# Patient Record
Sex: FEMALE | NOT HISPANIC OR LATINO
[De-identification: names, ages, dates, MRNs, and addresses within clinical notes are randomized per-mention and may not be internally consistent; named-entity substitution may affect disease eponyms.]

---

## 2019-05-24 ENCOUNTER — APPOINTMENT (OUTPATIENT)
Dept: BREAST CENTER | Facility: CLINIC | Age: 54
End: 2019-05-24
Payer: COMMERCIAL

## 2019-05-24 VITALS
HEIGHT: 61 IN | BODY MASS INDEX: 27.56 KG/M2 | HEART RATE: 89 BPM | SYSTOLIC BLOOD PRESSURE: 142 MMHG | WEIGHT: 146 LBS | DIASTOLIC BLOOD PRESSURE: 68 MMHG

## 2019-05-24 DIAGNOSIS — Z78.9 OTHER SPECIFIED HEALTH STATUS: ICD-10-CM

## 2019-05-24 PROCEDURE — 99204 OFFICE O/P NEW MOD 45 MIN: CPT

## 2019-05-24 NOTE — PHYSICAL EXAM
[Normocephalic] : normocephalic [EOMI] : extra ocular movement intact [Atraumatic] : atraumatic [PERRL] : pupils equal, round and reactive to light [Sclera nonicteric] : sclera nonicteric [Supple] : supple [No Supraclavicular Adenopathy] : no supraclavicular adenopathy [Examined in the supine and seated position] : examined in the supine and seated position [No dominant masses] : no dominant masses in right breast  [No dominant masses] : no dominant masses left breast [No Nipple Retraction] : no left nipple retraction [No Nipple Discharge] : no left nipple discharge [Breast Nipple Inversion] : nipples not inverted [Breast Nipple Retraction] : nipples not retracted [Breast Nipple Flattening] : nipples not flattened [Breast Nipple Fissures] : nipples not fissured [Breast Abnormal Lactation (Galactorrhea)] : no galactorrhea [Breast Abnormal Secretion Bloody Fluid] : no bloody discharge [Breast Abnormal Secretion Serous Fluid] : no serous discharge [Breast Abnormal Secretion Opalescent Fluid] : no milky discharge [No Axillary Lymphadenopathy] : no left axillary lymphadenopathy [No Edema] : no edema [No Rashes] : no rashes [No Ulceration] : no ulceration [de-identified] : No supraclavicular or axillary adenopathy. No dominant masses, normal to palpation. Everted nipple without discharge. No skin changes.\par \par  [de-identified] : No supraclavicular or axillary adenopathy. No dominant masses, normal to palpation. Everted nipple without discharge. No skin changes.\par \par

## 2019-05-24 NOTE — ASSESSMENT
[FreeTextEntry1] : 52 yo presents with mammogram demonstrating right breast calcifications.  No dominant masses palpable on exam.  Recommendation for right diagnostic mammogram (CD;s to be requested) and follow up 1 week after biopsy if biopsy is warranted.  If mammogram is benign then followup in 6 months.\par 1.  Right diagnostic mammogram\par 2.  Request imaging for upload\par 3.  Follow up in 2 weeks or 6 months depending on repeat mammogram results.

## 2019-05-24 NOTE — HISTORY OF PRESENT ILLNESS
[FreeTextEntry1] : I had the pleasure of seeing NICKY SNYDER  in the office today for a new breast evaluation secondary to new right breast calcifications seen on mammogram.\par \par Nicky is a weston 54 yo who presents for with abnormal imaging from 4/6/2019 demonstrating right breast calcification.  She has not undergone imaging since 2009.\par \par She denies dominant breast mass, skin changes or nipple discharge. She denies headaches, blurry vision, chest pain, SOB, abdominal pain, joint aches or difficult walking.\par \par Imaging:  [facility]\par 4/6/2019  Mammo screening nikhil bilat\par Impression:  The patient will be recalled for additional imaging evaluation of new grouped right lateral central microcalcifications as described above.  BIRADS 0\par \par 4/6/2019  breast ultrasound bilateral complete:  \par Impression:  Negative bilateral breast ultrasound.  No sonographic evidence of new significant breast pathology is seen.  BIRADS0 incomplete\par \par We reviewed and discussed clinical breast exam.  She understands no dominant masses are palpable on exam today.  She understands new calcifications were seen on imaging today.  My office will request her imaging and a right diagnostic mammogram will be setup for her.  If mammogram is benign then follow up in 6 months.  If mammogram recommends biopsy she will return 1 week after biopsy.  She understands and agrees to plan.  All questions answered.

## 2019-06-07 ENCOUNTER — APPOINTMENT (OUTPATIENT)
Dept: MAMMOGRAPHY | Facility: CLINIC | Age: 54
End: 2019-06-07
Payer: SELF-PAY

## 2019-06-07 ENCOUNTER — OUTPATIENT (OUTPATIENT)
Dept: OUTPATIENT SERVICES | Facility: HOSPITAL | Age: 54
LOS: 1 days | End: 2019-06-07
Payer: SELF-PAY

## 2019-06-07 ENCOUNTER — TRANSCRIPTION ENCOUNTER (OUTPATIENT)
Age: 54
End: 2019-06-07

## 2019-06-07 DIAGNOSIS — R92.8 OTHER ABNORMAL AND INCONCLUSIVE FINDINGS ON DIAGNOSTIC IMAGING OF BREAST: ICD-10-CM

## 2019-06-07 PROCEDURE — 77065 DX MAMMO INCL CAD UNI: CPT

## 2019-06-07 PROCEDURE — 77065 DX MAMMO INCL CAD UNI: CPT | Mod: 26,RT

## 2019-06-07 PROCEDURE — G0279: CPT

## 2019-06-07 PROCEDURE — G0279: CPT | Mod: 26

## 2019-06-24 ENCOUNTER — RESULT REVIEW (OUTPATIENT)
Age: 54
End: 2019-06-24

## 2019-06-24 ENCOUNTER — OUTPATIENT (OUTPATIENT)
Dept: OUTPATIENT SERVICES | Facility: HOSPITAL | Age: 54
LOS: 1 days | End: 2019-06-24
Payer: SELF-PAY

## 2019-06-24 ENCOUNTER — APPOINTMENT (OUTPATIENT)
Dept: MAMMOGRAPHY | Facility: CLINIC | Age: 54
End: 2019-06-24
Payer: SELF-PAY

## 2019-06-24 DIAGNOSIS — R92.8 OTHER ABNORMAL AND INCONCLUSIVE FINDINGS ON DIAGNOSTIC IMAGING OF BREAST: ICD-10-CM

## 2019-06-24 DIAGNOSIS — Z00.8 ENCOUNTER FOR OTHER GENERAL EXAMINATION: ICD-10-CM

## 2019-06-24 PROCEDURE — 19081 BX BREAST 1ST LESION STRTCTC: CPT

## 2019-06-24 PROCEDURE — 88305 TISSUE EXAM BY PATHOLOGIST: CPT

## 2019-06-24 PROCEDURE — 19081 BX BREAST 1ST LESION STRTCTC: CPT | Mod: RT

## 2019-06-24 PROCEDURE — 77065 DX MAMMO INCL CAD UNI: CPT

## 2019-06-24 PROCEDURE — 88360 TUMOR IMMUNOHISTOCHEM/MANUAL: CPT | Mod: 26

## 2019-06-24 PROCEDURE — 77065 DX MAMMO INCL CAD UNI: CPT | Mod: 26,RT

## 2019-06-24 PROCEDURE — A4648: CPT

## 2019-06-24 PROCEDURE — 88305 TISSUE EXAM BY PATHOLOGIST: CPT | Mod: 26

## 2019-06-24 PROCEDURE — 88360 TUMOR IMMUNOHISTOCHEM/MANUAL: CPT

## 2019-06-25 ENCOUNTER — APPOINTMENT (OUTPATIENT)
Dept: SURGERY | Facility: CLINIC | Age: 54
End: 2019-06-25

## 2019-07-01 ENCOUNTER — APPOINTMENT (OUTPATIENT)
Dept: SURGERY | Facility: CLINIC | Age: 54
End: 2019-07-01

## 2019-07-08 ENCOUNTER — APPOINTMENT (OUTPATIENT)
Dept: SURGERY | Facility: CLINIC | Age: 54
End: 2019-07-08
Payer: COMMERCIAL

## 2019-07-08 VITALS — WEIGHT: 150 LBS | BODY MASS INDEX: 28.32 KG/M2 | HEIGHT: 61 IN

## 2019-07-08 PROCEDURE — 99215 OFFICE O/P EST HI 40 MIN: CPT

## 2019-07-08 NOTE — HISTORY OF PRESENT ILLNESS
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  She has not undergone imaging since 2009.  She underwent biopsy of right breast on 6/7/2019  which demonstrated right DCIS grade 2-3 ER <>90% NV 0%.\par \par She denies dominant breast mass, skin changes or nipple discharge. She denies headaches, blurry vision, chest pain, SOB, abdominal pain, joint aches or difficult walking.\par \par Imaging:  [facility]\par 4/6/2019  Mammo screening nikhil bilat\par Impression:  The patient will be recalled for additional imaging evaluation of new grouped right lateral central microcalcifications as described above.  BIRADS 0\par \par 4/6/2019  breast ultrasound bilateral complete:  \par Impression:  Negative bilateral breast ultrasound.  No sonographic evidence of new significant breast pathology is seen.  BIRADS0 incomplete\par \par 7/5/2019  MRI breast\par Left breast:  Multiple findings in the left breast as above.  MR guided biopsy x2 is advised of the linear enhancement at 12:00 and a portion of what is likely contiguous to area of non mass enhancements in the lower outer breast.  Recommendation for left MRI biopsy x2 at this time.\par \par Right breast:  Extensive stippled non mass enhancement in the lateral central slightly inferior right breast with what appears to a be a biopsy marker in the midportion.  Correlation with outside imaging and pathology reports is advised at this time.  Assuming this is the area of recent biopsy demonstrating DCIS, MR findings would suggest extensive disease as above.  BIRADS 4B.  \par \par We reviewed and discussed pathology.   Nicky understands that the diagnosis is of right breast stage 0 breast cancer and that recommendation is for surgical management followed by adjuvant treatment.  We thoroughly discussed pathophysiology of ductal carcinoma in situ as well as treatment of surgical breast conservation v mastectomy.\par \par We discussed technical aspects of surgical management which includes right breast localized (needle or ISIDRA ) wide lumpectomy, the need for clear margins and radiation.  We discussed the need for radiation to decrease the locoregional recurrence by up to 50%.  We also discussed 5-6 weeks for standard radiation however she will discuss specifics including Preston hypofractionation with Dr. Toribio.\par \par We also discussed sentinel lymph node sampling if she chooses to undergo mastectomy secondary to the chance of finding an occult breast cancer.  Technical aspects were discussed including the need for blue dye, radiotracer and axillary dissection if the nodes are positive. She also understands that a drain would be placed if an axillary dissection is required. If sentinel mapping fails. then we will await final pathology to determine need for axillary dissection.\par \par Risks v benefits of surgical options discussed as well as recommendation for adjuvant hormonal therapy with aromatase inhibitor for 5-10 years.\par \par She is leaning toward breast conservation and I have recommended she have consults with Dr. Galloway and Dr. Toribio from Medical Oncology and Radiation Oncology respectively.  I have discussed MRI results.  MRI demonstrated 2 suspicious areas in the left breast with recommendation of MR biopsy x2 sites and right breast demonstrated extensive stippled non mass enhancement.\par \par

## 2019-07-08 NOTE — ASSESSMENT
[FreeTextEntry1] : 54 yo presents with newly diagnosed right breast DCIS grade 2-3  ER >90% IL 0%.  MRI of the breast demonstrated extensive right breast non mass enhancement and 2 suspicious areas in the left breast.  Recommendation for MR of left breast x2 sites, consultation with medical oncologist, radiation oncologist, plastic surgeon.\par 1.  MR biopsy of left breast x2 sites\par 2.  Consultation with radiation oncologist -Dr. Toribio\par 3.  Consultation with medical oncologist- Dr. Lund\par 4.  Consultation with plastic surgeon- Dr. De La Vega\par 5.  Follow up genetic results\par 6.  Follow up Oncotype \par 7.  Follow up in 2 weeks

## 2019-07-08 NOTE — PHYSICAL EXAM
[Normocephalic] : normocephalic [Atraumatic] : atraumatic [EOMI] : extra ocular movement intact [PERRL] : pupils equal, round and reactive to light [Sclera nonicteric] : sclera nonicteric [Supple] : supple [No Supraclavicular Adenopathy] : no supraclavicular adenopathy [Examined in the supine and seated position] : examined in the supine and seated position [No dominant masses] : no dominant masses in right breast  [No dominant masses] : no dominant masses left breast [No Nipple Retraction] : no left nipple retraction [No Nipple Discharge] : no left nipple discharge [No Axillary Lymphadenopathy] : no left axillary lymphadenopathy [No Rashes] : no rashes [No Edema] : no edema [No Ulceration] : no ulceration [Breast Nipple Inversion] : nipples not inverted [Breast Nipple Retraction] : nipples not retracted [Breast Nipple Flattening] : nipples not flattened [Breast Nipple Fissures] : nipples not fissured [Breast Abnormal Lactation (Galactorrhea)] : no galactorrhea [Breast Abnormal Secretion Bloody Fluid] : no bloody discharge [Breast Abnormal Secretion Serous Fluid] : no serous discharge [de-identified] : No supraclavicular or axillary adenopathy. No dominant masses, normal to palpation. Everted nipple without discharge. No skin changes.\par \par  [Breast Abnormal Secretion Opalescent Fluid] : no milky discharge [de-identified] : No supraclavicular or axillary adenopathy. No dominant masses, normal to palpation. Everted nipple without discharge. No skin changes.\par \par

## 2019-07-11 ENCOUNTER — OUTPATIENT (OUTPATIENT)
Dept: OUTPATIENT SERVICES | Facility: HOSPITAL | Age: 54
LOS: 1 days | Discharge: HOME | End: 2019-07-11

## 2019-07-12 ENCOUNTER — APPOINTMENT (OUTPATIENT)
Dept: RADIATION ONCOLOGY | Facility: CLINIC | Age: 54
End: 2019-07-12
Payer: COMMERCIAL

## 2019-07-12 VITALS
RESPIRATION RATE: 16 BRPM | BODY MASS INDEX: 27.21 KG/M2 | HEART RATE: 78 BPM | DIASTOLIC BLOOD PRESSURE: 84 MMHG | TEMPERATURE: 98.3 F | SYSTOLIC BLOOD PRESSURE: 147 MMHG | WEIGHT: 144 LBS | OXYGEN SATURATION: 98 %

## 2019-07-12 DIAGNOSIS — Z80.3 FAMILY HISTORY OF MALIGNANT NEOPLASM OF BREAST: ICD-10-CM

## 2019-07-12 PROCEDURE — 99205 OFFICE O/P NEW HI 60 MIN: CPT | Mod: 25

## 2019-07-12 NOTE — PHYSICAL EXAM
[Normal] : normal scars, no masses, no nipple discharge [Supraclavicular Lymph Nodes Enlarged Bilaterally] : supraclavicular [Cervical Lymph Nodes Enlarged Anterior Bilaterally] : anterior cervical [Axillary Lymph Nodes Enlarged Bilaterally] : axillary [de-identified] : Examination performed in the presence of a female chaperone.  Small post-biopsy scab on right breast LOQ, minimal ecchymosis.

## 2019-07-12 NOTE — REVIEW OF SYSTEMS
[Fatigue: Grade 0] : Fatigue: Grade 0 [Breast Pain: Grade 0] : Breast Pain: Grade 0 [Negative] : Allergic/Immunologic

## 2019-07-12 NOTE — OB/GYN HISTORY
[unknown] : the patient is unsure of the date of her LMP [Currently In Menopause] : currently in menopause [Menopause Age: ____] : patient was [unfilled] years old at menopause [___] : Full Term: [unfilled] [History of Hormone Replacement Therapy] : no history of hormone replacement therapy

## 2019-07-12 NOTE — LETTER CLOSING
[Consult Closing:] : Thank you for allowing me to participate in the care of this patient.  If you have any questions, please do not hesitate to contact me. [Sincerely yours,] : Sincerely yours, [FreeTextEntry3] : Aman Toribio MD\par Attending Physician\par Department of Radiation Medicine\par [facility] [place], [facility]\par \par \par [facility] \par [employer] [facility]\par

## 2019-07-12 NOTE — DISEASE MANAGEMENT
[0] : 0 [Clinical] : TNM Stage: c [FreeTextEntry4] : right breast DCIS [TTNM] : is [NTNM] : 0 [MTNM] : 0

## 2019-07-12 NOTE — HISTORY OF PRESENT ILLNESS
[FreeTextEntry1] : Ms Tanner is a 53 year old female presents with newly diagnosed right breast DCIS grade 2-3 ER >90% OR 0%. She presents here today for consideration of radiation therapy for right breast cancer. \par \par She had an abnormal mammogram on 4/6/19 which showed calcification in the right breast. Sonogram was BIRADS 0 incomplete. She had a diagnostic mammogram on 6/7/19 which showed right breast calcifications at 8-9:00.  BIRADS 4B.\par \par She had a stereotactic core biopsy done on 6/24/19, pathology report indicated ductal carcinoma in situ, solid-cribriform pattern, grade 2-3 atypia, necrosis. Microcalcifications was present in DCIS.  Tumor was ER >90%, and OR negative 0%.\par \par 7/5/2019 MRI breast:\par Left breast: Multiple findings in the left breast as above. MR guided biopsy x2 is advised of the linear enhancement at 12:00 and a portion of what is likely contiguous to area of non mass enhancements in the lower outer breast. Recommendation for left MRI biopsy x2 at this time.\par Right breast: Extensive stippled non mass enhancement in the lateral central slightly inferior right breast with what appears to a be a biopsy marker in the midportion. Correlation with outside imaging and pathology reports is advised at this time. Assuming this is the area of recent biopsy demonstrating DCIS, MR findings would suggest extensive disease as above. BIRADS 4B. \par \par Patient follow up with Dr. Cruz on 7/8/19.  She denies breast pain, nipple discharge and lymphedema. She has not decided on management.

## 2019-07-18 ENCOUNTER — APPOINTMENT (OUTPATIENT)
Dept: PLASTIC SURGERY | Facility: CLINIC | Age: 54
End: 2019-07-18

## 2019-07-23 ENCOUNTER — APPOINTMENT (OUTPATIENT)
Dept: PLASTIC SURGERY | Facility: CLINIC | Age: 54
End: 2019-07-23
Payer: COMMERCIAL

## 2019-07-23 VITALS
HEART RATE: 84 BPM | SYSTOLIC BLOOD PRESSURE: 148 MMHG | WEIGHT: 146.03 LBS | OXYGEN SATURATION: 99 % | TEMPERATURE: 99.3 F | DIASTOLIC BLOOD PRESSURE: 84 MMHG | BODY MASS INDEX: 27.57 KG/M2 | HEIGHT: 61 IN | RESPIRATION RATE: 16 BRPM

## 2019-07-23 PROCEDURE — XXXXX: CPT

## 2019-08-02 ENCOUNTER — APPOINTMENT (OUTPATIENT)
Dept: MRI IMAGING | Facility: CLINIC | Age: 54
End: 2019-08-02
Payer: SELF-PAY

## 2019-08-02 ENCOUNTER — RESULT REVIEW (OUTPATIENT)
Age: 54
End: 2019-08-02

## 2019-08-02 ENCOUNTER — OUTPATIENT (OUTPATIENT)
Dept: OUTPATIENT SERVICES | Facility: HOSPITAL | Age: 54
LOS: 1 days | End: 2019-08-02
Payer: SELF-PAY

## 2019-08-02 DIAGNOSIS — Z00.8 ENCOUNTER FOR OTHER GENERAL EXAMINATION: ICD-10-CM

## 2019-08-02 DIAGNOSIS — R93.89 ABNORMAL FINDINGS ON DIAGNOSTIC IMAGING OF OTHER SPECIFIED BODY STRUCTURES: ICD-10-CM

## 2019-08-02 PROCEDURE — 77065 DX MAMMO INCL CAD UNI: CPT

## 2019-08-02 PROCEDURE — A9585: CPT

## 2019-08-02 PROCEDURE — 88305 TISSUE EXAM BY PATHOLOGIST: CPT | Mod: 26

## 2019-08-02 PROCEDURE — 19085 BX BREAST 1ST LESION MR IMAG: CPT | Mod: LT

## 2019-08-02 PROCEDURE — 19085 BX BREAST 1ST LESION MR IMAG: CPT

## 2019-08-02 PROCEDURE — 19086 BX BREAST ADD LESION MR IMAG: CPT | Mod: LT

## 2019-08-02 PROCEDURE — 19086 BX BREAST ADD LESION MR IMAG: CPT

## 2019-08-02 PROCEDURE — 88305 TISSUE EXAM BY PATHOLOGIST: CPT

## 2019-08-02 PROCEDURE — 77065 DX MAMMO INCL CAD UNI: CPT | Mod: 26,LT

## 2019-08-02 PROCEDURE — A4648: CPT

## 2019-08-05 LAB — SURGICAL PATHOLOGY STUDY: SIGNIFICANT CHANGE UP

## 2019-08-19 ENCOUNTER — APPOINTMENT (OUTPATIENT)
Dept: SURGERY | Facility: CLINIC | Age: 54
End: 2019-08-19
Payer: COMMERCIAL

## 2019-08-19 VITALS
HEART RATE: 96 BPM | SYSTOLIC BLOOD PRESSURE: 147 MMHG | BODY MASS INDEX: 27.75 KG/M2 | HEIGHT: 61 IN | WEIGHT: 147 LBS | DIASTOLIC BLOOD PRESSURE: 77 MMHG

## 2019-08-19 PROCEDURE — 99215 OFFICE O/P EST HI 40 MIN: CPT

## 2019-08-19 NOTE — HISTORY OF PRESENT ILLNESS
[FreeTextEntry1] : I had the pleasure of seeing Nicky Tanner in the office for a follow up visit to review recent biopsies of the breast.  Nicky is a weston 54 yo postmenopausal female with newly diagnosed right breast DCIS ER >90% TX 0, who underwent MRI evaluation and additional biopsies. \par \par 8/2/2019 Breast, left 12:00 MRI guided core biopsy\par Negative for malignancy\par Proliferative changes with ductal hyperplasia, non atypical, adenosis, and apocrine metaplasia, Fibroadenomatoid change\par Breast left, lower outer, approximately 3:00 MRI guided core biopsy\par -Proliferative changes with ductal hyperplasia, non atypical adenosis, and apocrine metaplasia\par Microcalcification\par \par We reviewed additional left breast biopsies and she understands that they are benign.  She will proceed with bilateral mastectomy with right sentinel lymph node biopsy for DCIS.  We discussed the risks v benefits of sentinel node biopsy and the need for blue dye and radiotracer.  We also reviewed the rate of lymphedema of 7-10%.  She understands that the results of the biopsy will be on permanent sectioning and not intra-operative frozen section.\par \par The patient has an insurance plan that neither I, nor Dr. De La Vega participate in, yet she would like to have care provided by us.  I have requested that SOAMAI review this issue to see if we can be added to this plan STAT or work out a plan for the patient.\par \par She also states that her insurance will not allow her to have surgery prior to March 2020 because that would be 13 months after she added the plan.  I will write a letter of medical necessity so that they can authorize surgery to occur in an acceptable timeframe. \par \par All questions were answered. \par We discussed the technical aspects of the surgery, including risks v benefits.\par

## 2019-08-19 NOTE — ASSESSMENT
[FreeTextEntry1] : 54 yo postmenopausal female with DCIS ER >90% OR - of the right breast s/p MRI assessment and 2 site biopsy of the left breast, presents for review of pathology and surgical plan.  She understands that undergoing mastectomy would likely omit radiation and hormonal therapy.  She has chosen to undergo bilateral mastectomy with right sentinel lymph node biopsy, without reconstruction.  We discussed this choice at length and this is what the patient prefers.\par 1. Insurance issue to be clarified by financial department\par 2. Plan for bilateral mastectomy with right SLNB and skin closure

## 2019-08-19 NOTE — PHYSICAL EXAM
[Normocephalic] : normocephalic [Atraumatic] : atraumatic [EOMI] : extra ocular movement intact [PERRL] : pupils equal, round and reactive to light [Sclera nonicteric] : sclera nonicteric [No Supraclavicular Adenopathy] : no supraclavicular adenopathy [Supple] : supple [Examined in the supine and seated position] : examined in the supine and seated position [No dominant masses] : no dominant masses in right breast  [No dominant masses] : no dominant masses left breast [No Nipple Retraction] : no left nipple retraction [No Nipple Discharge] : no left nipple discharge [No Axillary Lymphadenopathy] : no left axillary lymphadenopathy [No Rashes] : no rashes [No Edema] : no edema [No Ulceration] : no ulceration [Breast Nipple Inversion] : nipples not inverted [Breast Nipple Retraction] : nipples not retracted [Breast Nipple Fissures] : nipples not fissured [Breast Nipple Flattening] : nipples not flattened [Breast Abnormal Secretion Bloody Fluid] : no bloody discharge [Breast Abnormal Lactation (Galactorrhea)] : no galactorrhea [Breast Abnormal Secretion Serous Fluid] : no serous discharge [Breast Abnormal Secretion Opalescent Fluid] : no milky discharge [de-identified] : No supraclavicular or axillary adenopathy. No dominant masses, normal to palpation. Everted nipple without discharge. No skin changes.\par \par  [de-identified] : No supraclavicular or axillary adenopathy. No dominant masses, normal to palpation. Everted nipple without discharge. No skin changes.\par \par

## 2019-10-07 ENCOUNTER — APPOINTMENT (OUTPATIENT)
Dept: SURGERY | Facility: CLINIC | Age: 54
End: 2019-10-07

## 2019-10-14 ENCOUNTER — APPOINTMENT (OUTPATIENT)
Dept: SURGERY | Facility: CLINIC | Age: 54
End: 2019-10-14
Payer: COMMERCIAL

## 2019-10-14 VITALS
WEIGHT: 152 LBS | SYSTOLIC BLOOD PRESSURE: 152 MMHG | HEIGHT: 61 IN | BODY MASS INDEX: 28.7 KG/M2 | HEART RATE: 86 BPM | DIASTOLIC BLOOD PRESSURE: 78 MMHG

## 2019-10-14 PROCEDURE — 99215 OFFICE O/P EST HI 40 MIN: CPT

## 2019-10-14 NOTE — ASSESSMENT
[FreeTextEntry1] : 54 yo postmenopausal female presents with newly diagnosed right breast DCIS ER90% MI- and is undergoing bilateral mastectomy with right sentinel lymph node biopsy and oncoplastic closure.  She understands risks v benefits, technical aspects of procedure and alternatives. We reviewed previous biopsies of left breast.\par 1. Plan for surgery\par 2. Medical clearance\par 3. Follow up with Dr. De La Vega for oncoplastic closure planning

## 2019-10-14 NOTE — HISTORY OF PRESENT ILLNESS
[FreeTextEntry1] : I had the pleasure of seeing Nicky Tanner in the office for a follow up visit regarding right breast cancer (DCIS ER90% IN- \par \par She had recently undergone left brast biopsy x 2 which were benign on 8/2/2019.\par \par Nicky had issues with insurance that are now resolved. She would like to proceed with bilateral mastectomy with right sentinel lymph node biopsy.  She understands rationale for lymph node sampling and need for blue dye and radiotracer.  She is certain that she would not like to undergo reconstruction of breasts, however she would like to undergo oncoplastic skin closure by Dr. De La Vega. \par \par We discussed technical aspects of surgery and alternative of immediate reconstruction or breast conservation.  She understands technical aspects of mastectomy and sentinel lymph node biopsy on the right side, and risk reduction left mastectomy.\par \par All questions were answered.

## 2019-10-16 ENCOUNTER — APPOINTMENT (OUTPATIENT)
Dept: INTERNAL MEDICINE | Facility: CLINIC | Age: 54
End: 2019-10-16
Payer: COMMERCIAL

## 2019-10-16 VITALS
SYSTOLIC BLOOD PRESSURE: 140 MMHG | RESPIRATION RATE: 13 BRPM | HEART RATE: 105 BPM | OXYGEN SATURATION: 98 % | BODY MASS INDEX: 27.96 KG/M2 | DIASTOLIC BLOOD PRESSURE: 82 MMHG | WEIGHT: 148 LBS

## 2019-10-16 DIAGNOSIS — Z82.49 FAMILY HISTORY OF ISCHEMIC HEART DISEASE AND OTHER DISEASES OF THE CIRCULATORY SYSTEM: ICD-10-CM

## 2019-10-16 DIAGNOSIS — Z82.0 FAMILY HISTORY OF EPILEPSY AND OTHER DISEASES OF THE NERVOUS SYSTEM: ICD-10-CM

## 2019-10-16 PROCEDURE — G0442 ANNUAL ALCOHOL SCREEN 15 MIN: CPT | Mod: 59

## 2019-10-16 PROCEDURE — 90715 TDAP VACCINE 7 YRS/> IM: CPT

## 2019-10-16 PROCEDURE — 99203 OFFICE O/P NEW LOW 30 MIN: CPT | Mod: 25

## 2019-10-16 PROCEDURE — G0444 DEPRESSION SCREEN ANNUAL: CPT | Mod: 59

## 2019-10-16 PROCEDURE — 90471 IMMUNIZATION ADMIN: CPT

## 2019-10-16 NOTE — ASSESSMENT
[FreeTextEntry1] : VASILE is a 53 year F whom is here today for an initial office visit as her insurance company no longer covers her prior PMD.\par Today, patient reports that she is feeling well. States that she has been handling this new diagnosis well and denies any changes with regards to her mood. Does report Acid reflux type symptoms that have been going on for the past one to 2 years. Patient describes it as a burning sensation that occurs throughout the day. Denies any chest pain or shortness of breath. Has not tried any OTC medications. Has never had a colonoscopy or upper EGD. Denies any weight loss, fever, chills.She does report cough since the onset of the symptoms and that her taste in her mouth upon awakening some days.\par \par -PMH: Ductal Carcinoma Insitu Right Breast (Dx June 2019)\par -SH: . Dentist. 2 daughters.\par \par Follows with the following Physicians:\par -Prior PMD: Dr. Mora Medina ([phone])\par -OBGYN: Dr. Toshia Villarreal ([phone])\par -Plastics: Dr. Nicolas De La Vega ([phone])\par \par -Vaccines: Needs Shingles\par -DEXA: 2015. Normal. \par -Mammogram: 2019. Abnormal \par -Pap Smear: 2017. Normal\par -Colonoscopy: Needs\par \par -Ductal Carcinoma In Situ Right breast: Has b/l Mastectomy scheduled on 11/13/19.\par \par -F/u records (Ie; ekg and prior labs) from prior PMD\par -F/u w/ GI for EGD and Colonoscopy \par -Take OTC PPI for Presumed GERD x 8 weeks. \par -RTO 3months or sooner if needed

## 2019-10-16 NOTE — HISTORY OF PRESENT ILLNESS
[FreeTextEntry8] : VASILE is a 53 year F whom is here today for an initial office visit as her insurance company no longer covers her prior PMD.\par Today, patient reports that she is feeling well. States that she has been handling this new diagnosis well and denies any changes with regards to her mood. Does report Acid reflux type symptoms that have been going on for the past one to 2 years. Patient describes it as a burning sensation that occurs throughout the day. Denies any chest pain or shortness of breath. Has not tried any OTC medications. Has never had a colonoscopy or upper EGD. Denies any weight loss, fever, chills.She does report cough since the onset of the symptoms and that her taste in her mouth upon awakening some days.\par \par -PMH: Ductal Carcinoma Insitu Right Breast (Dx June 2019)\par -SH: . Dentist. 2 daughters.\par \par Follows with the following Physicians:\par -Prior PMD: Dr. Mora Medina ([phone])\par -OBGYN: Dr. Toshia Villarreal ([phone])\par -Plastics: Dr. Nicolas De La Vega ([phone])\par \par -Vaccines: Needs Shingles\par -DEXA: 2015. Normal. \par -Mammogram: 2019. Abnormal \par -Pap Smear: 2017. Normal\par -Colonoscopy: Needs\par \par -Ductal Carcinoma In Situ Right breast: Has b/l Mastectomy scheduled on 11/13/19.

## 2019-10-16 NOTE — HEALTH RISK ASSESSMENT
[] : No [Yes] : Yes [Monthly or less (1 pt)] : Monthly or less (1 point) [1 or 2 (0 pts)] : 1 or 2 (0 points) [Never (0 pts)] : Never (0 points) [No] : In the past 12 months have you used drugs other than those required for medical reasons? No [0] : 2) Feeling down, depressed, or hopeless: Not at all (0) [Audit-CScore] : 1 [[id number]] : 0

## 2019-10-17 ENCOUNTER — APPOINTMENT (OUTPATIENT)
Dept: PLASTIC SURGERY | Facility: CLINIC | Age: 54
End: 2019-10-17
Payer: COMMERCIAL

## 2019-10-17 VITALS
SYSTOLIC BLOOD PRESSURE: 149 MMHG | WEIGHT: 148 LBS | DIASTOLIC BLOOD PRESSURE: 91 MMHG | RESPIRATION RATE: 14 BRPM | HEART RATE: 91 BPM | OXYGEN SATURATION: 99 % | HEIGHT: 61 IN | BODY MASS INDEX: 27.94 KG/M2

## 2019-10-17 DIAGNOSIS — D05.11 INTRADUCTAL CARCINOMA IN SITU OF RIGHT BREAST: ICD-10-CM

## 2019-10-17 PROCEDURE — XXXXX: CPT

## 2019-11-01 ENCOUNTER — OUTPATIENT (OUTPATIENT)
Dept: OUTPATIENT SERVICES | Facility: HOSPITAL | Age: 54
LOS: 1 days | End: 2019-11-01
Payer: COMMERCIAL

## 2019-11-01 VITALS
HEART RATE: 80 BPM | WEIGHT: 149.91 LBS | RESPIRATION RATE: 20 BRPM | TEMPERATURE: 99 F | SYSTOLIC BLOOD PRESSURE: 144 MMHG | DIASTOLIC BLOOD PRESSURE: 81 MMHG | HEIGHT: 61 IN

## 2019-11-01 DIAGNOSIS — Z90.49 ACQUIRED ABSENCE OF OTHER SPECIFIED PARTS OF DIGESTIVE TRACT: Chronic | ICD-10-CM

## 2019-11-01 DIAGNOSIS — Z98.891 HISTORY OF UTERINE SCAR FROM PREVIOUS SURGERY: Chronic | ICD-10-CM

## 2019-11-01 DIAGNOSIS — Z01.818 ENCOUNTER FOR OTHER PREPROCEDURAL EXAMINATION: ICD-10-CM

## 2019-11-01 DIAGNOSIS — D05.11 INTRADUCTAL CARCINOMA IN SITU OF RIGHT BREAST: ICD-10-CM

## 2019-11-01 DIAGNOSIS — Z29.9 ENCOUNTER FOR PROPHYLACTIC MEASURES, UNSPECIFIED: ICD-10-CM

## 2019-11-01 LAB
ANION GAP SERPL CALC-SCNC: 15 MMOL/L — SIGNIFICANT CHANGE UP (ref 5–17)
BUN SERPL-MCNC: 16 MG/DL — SIGNIFICANT CHANGE UP (ref 8–20)
CALCIUM SERPL-MCNC: 9.5 MG/DL — SIGNIFICANT CHANGE UP (ref 8.6–10.2)
CHLORIDE SERPL-SCNC: 100 MMOL/L — SIGNIFICANT CHANGE UP (ref 98–107)
CO2 SERPL-SCNC: 26 MMOL/L — SIGNIFICANT CHANGE UP (ref 22–29)
CREAT SERPL-MCNC: 0.89 MG/DL — SIGNIFICANT CHANGE UP (ref 0.5–1.3)
GLUCOSE SERPL-MCNC: 92 MG/DL — SIGNIFICANT CHANGE UP (ref 70–115)
HCT VFR BLD CALC: 38.9 % — SIGNIFICANT CHANGE UP (ref 34.5–45)
HGB BLD-MCNC: 12.8 G/DL — SIGNIFICANT CHANGE UP (ref 11.5–15.5)
MCHC RBC-ENTMCNC: 31 PG — SIGNIFICANT CHANGE UP (ref 27–34)
MCHC RBC-ENTMCNC: 32.9 GM/DL — SIGNIFICANT CHANGE UP (ref 32–36)
MCV RBC AUTO: 94.2 FL — SIGNIFICANT CHANGE UP (ref 80–100)
PLATELET # BLD AUTO: 317 K/UL — SIGNIFICANT CHANGE UP (ref 150–400)
POTASSIUM SERPL-MCNC: 4.5 MMOL/L — SIGNIFICANT CHANGE UP (ref 3.5–5.3)
POTASSIUM SERPL-SCNC: 4.5 MMOL/L — SIGNIFICANT CHANGE UP (ref 3.5–5.3)
RBC # BLD: 4.13 M/UL — SIGNIFICANT CHANGE UP (ref 3.8–5.2)
RBC # FLD: 12.8 % — SIGNIFICANT CHANGE UP (ref 10.3–14.5)
SODIUM SERPL-SCNC: 141 MMOL/L — SIGNIFICANT CHANGE UP (ref 135–145)
WBC # BLD: 7.7 K/UL — SIGNIFICANT CHANGE UP (ref 3.8–10.5)
WBC # FLD AUTO: 7.7 K/UL — SIGNIFICANT CHANGE UP (ref 3.8–10.5)

## 2019-11-01 PROCEDURE — 93010 ELECTROCARDIOGRAM REPORT: CPT

## 2019-11-01 PROCEDURE — G0463: CPT

## 2019-11-01 PROCEDURE — 85027 COMPLETE CBC AUTOMATED: CPT

## 2019-11-01 PROCEDURE — 93005 ELECTROCARDIOGRAM TRACING: CPT

## 2019-11-01 PROCEDURE — 36415 COLL VENOUS BLD VENIPUNCTURE: CPT

## 2019-11-01 PROCEDURE — 80048 BASIC METABOLIC PNL TOTAL CA: CPT

## 2019-11-01 RX ORDER — INFLUENZA VIRUS VACCINE 15; 15; 15; 15 UG/.5ML; UG/.5ML; UG/.5ML; UG/.5ML
0.5 SUSPENSION INTRAMUSCULAR ONCE
Refills: 0 | Status: DISCONTINUED | OUTPATIENT
Start: 2019-11-01 | End: 2019-11-17

## 2019-11-01 NOTE — H&P PST ADULT - NSANTHOSAYNRD_GEN_A_CORE
No. SHIVANI screening performed.  STOP BANG Legend: 0-2 = LOW Risk; 3-4 = INTERMEDIATE Risk; 5-8 = HIGH Risk

## 2019-11-01 NOTE — H&P PST ADULT - ASSESSMENT
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    CAPRINI VTE 2.0 SCORE [CLOT updated 2019]    AGE RELATED RISK FACTORS                                                       MOBILITY RELATED FACTORS  [ x ] Age 41-60 years                                            (1 Point)                    [ ] Bed rest                                                        (1 Point)  [ ] Age: 61-74 years                                           (2 Points)                  [ ] Plaster cast                                                   (2 Points)  [ ] Age= 75 years                                              (3 Points)                    [ ] Bed bound for more than 72 hours                 (2 Points)    DISEASE RELATED RISK FACTORS                                               GENDER SPECIFIC FACTORS  [ ] Edema in the lower extremities                       (1 Point)              [ ] Pregnancy                                                     (1 Point)  [ ] Varicose veins                                               (1 Point)                     [ ] Post-partum < 6 weeks                                   (1 Point)             [x ] BMI > 25 Kg/m2                                            (1 Point)                     [ ] Hormonal therapy  or oral contraception          (1 Point)                 [ ] Sepsis (in the previous month)                        (1 Point)               [ ] History of pregnancy complications                 (1 point)  [ ] Pneumonia or serious lung disease                                               [ ] Unexplained or recurrent                     (1 Point)           (in the previous month)                               (1 Point)  [ ] Abnormal pulmonary function test                     (1 Point)                 SURGERY RELATED RISK FACTORS  [ ] Acute myocardial infarction                              (1 Point)               [ ]  Section                                             (1 Point)  [ ] Congestive heart failure (in the previous month)  (1 Point)      [ ] Minor surgery                                                  (1 Point)   [ ] Inflammatory bowel disease                             (1 Point)               [ ] Arthroscopic surgery                                        (2 Points)  [ ] Central venous access                                      (2 Points)                [x ] General surgery lasting more than 45 minutes (2 points)  [ x ] Malignancy- Present or previous                   (2 Points)                [ ] Elective arthroplasty                                         (5 points)    [ ] Stroke (in the previous month)                          (5 Points)                                                                                                                                                           HEMATOLOGY RELATED FACTORS                                                 TRAUMA RELATED RISK FACTORS  [ ] Prior episodes of VTE                                     (3 Points)                [ ] Fracture of the hip, pelvis, or leg                       (5 Points)  [ ] Positive family history for VTE                         (3 Points)             [ ] Acute spinal cord injury (in the previous month)  (5 Points)  [ ] Prothrombin 13838 A                                     (3 Points)               [ ] Paralysis  (less than 1 month)                             (5 Points)  [ ] Factor V Leiden                                             (3 Points)                  [ ] Multiple Trauma within 1 month                        (5 Points)  [ ] Lupus anticoagulants                                     (3 Points)                                                           [ ] Anticardiolipin antibodies                               (3 Points)                                                       [ ] High homocysteine in the blood                      (3 Points)                                             [ ] Other congenital or acquired thrombophilia      (3 Points)                                                [ ] Heparin induced thrombocytopenia                  (3 Points)                                     Total Score [     6     ]    OPIOID RISK TOOL    DEO EACH BOX THAT APPLIES AND ADD TOTALS AT THE END    FAMILY HISTORY OF SUBSTANCE ABUSE                 FEMALE         MALE                                                Alcohol                             [  ]1 pt          [  ]3pts                                               Illegal Durgs                     [  ]2 pts        [  ]3pts                                               Rx Drugs                           [  ]4 pts        [  ]4 pts    PERSONAL HISTORY OF SUBSTANCE ABUSE                                                                                          Alcohol                             [  ]3 pts       [  ]3 pts                                               Illegal Drugs                     [  ]4 pts        [  ]4 pts                                               Rx Drugs                           [  ]5 pts        [  ]5 pts    AGE BETWEEN 16-45 YEARS                                      [  ]1 pt         [  ]1 pt    HISTORY OF PREADOLESCENT   SEXUAL ABUSE                                                             [  ]3 pts        [  ]0pts    PSYCHOLOGICAL DISEASE                     ADD, OCD, Bipolar, Schizophrenia        [  ]2 pts         [  ]2 pts                      Depression                                               [  ]1 pt           [  ]1 pt           SCORING TOTAL   (add numbers and type here)              (*0*)                                     A score of 3 or lower indicated LOW risk for future opioid abuse  A score of 4 to 7 indicated moderate risk for future opioid abuse  A score of 8 or higher indicates a high risk for opioid abuse

## 2019-11-01 NOTE — H&P PST ADULT - NSICDXFAMILYHX_GEN_ALL_CORE_FT
FAMILY HISTORY:  Father  Still living? No  FH: diabetes mellitus, Age at diagnosis: 41-50  FH: hypertension, Age at diagnosis: 41-50    Mother  Still living? No  FH: Parkinson's disease, Age at diagnosis: 61-70

## 2019-11-01 NOTE — H&P PST ADULT - ATTENDING COMMENTS
Patient seen and examined. She is undergoing bilateral mastectomy with right sentinel lymph node biopsy possible axillary dissection. She has right breast cancer. She is undergoing skin closure without reconstruction. She understands alternative as well as risks v benefits. All questions were answered.

## 2019-11-01 NOTE — H&P PST ADULT - HISTORY OF PRESENT ILLNESS
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  Pt had mammography in May followed by positive biopsy.

## 2019-11-01 NOTE — H&P PST ADULT - NSICDXPROBLEM_GEN_ALL_CORE_FT
PROBLEM DIAGNOSES  Problem: Ductal carcinoma in situ of right breast  Assessment and Plan: Bilateral skin sparing mastectomy with right sentinel lymph node biopsy, oncogenic closure with Dr. De La Vega.    Problem: Need for prophylactic measure  Assessment and Plan: Caprini score  6, high risk for VTE, SCDs ordered, surgical team to assess need for pharm proph

## 2019-11-05 PROBLEM — Z78.9 OTHER SPECIFIED HEALTH STATUS: Chronic | Status: ACTIVE | Noted: 2019-11-01

## 2019-11-12 ENCOUNTER — TRANSCRIPTION ENCOUNTER (OUTPATIENT)
Age: 54
End: 2019-11-12

## 2019-11-13 ENCOUNTER — RESULT REVIEW (OUTPATIENT)
Age: 54
End: 2019-11-13

## 2019-11-13 ENCOUNTER — APPOINTMENT (OUTPATIENT)
Dept: SURGERY | Facility: HOSPITAL | Age: 54
End: 2019-11-13

## 2019-11-13 ENCOUNTER — INPATIENT (INPATIENT)
Facility: HOSPITAL | Age: 54
LOS: 0 days | Discharge: HOME | DRG: 578 | End: 2019-11-13
Attending: SURGERY | Admitting: SURGERY
Payer: COMMERCIAL

## 2019-11-13 VITALS
SYSTOLIC BLOOD PRESSURE: 146 MMHG | TEMPERATURE: 98 F | OXYGEN SATURATION: 100 % | WEIGHT: 147.93 LBS | DIASTOLIC BLOOD PRESSURE: 77 MMHG | HEIGHT: 61 IN | HEART RATE: 90 BPM | RESPIRATION RATE: 18 BRPM

## 2019-11-13 VITALS
OXYGEN SATURATION: 100 % | DIASTOLIC BLOOD PRESSURE: 71 MMHG | HEART RATE: 78 BPM | RESPIRATION RATE: 16 BRPM | SYSTOLIC BLOOD PRESSURE: 141 MMHG

## 2019-11-13 DIAGNOSIS — Z98.891 HISTORY OF UTERINE SCAR FROM PREVIOUS SURGERY: Chronic | ICD-10-CM

## 2019-11-13 DIAGNOSIS — D05.11 INTRADUCTAL CARCINOMA IN SITU OF RIGHT BREAST: ICD-10-CM

## 2019-11-13 DIAGNOSIS — Z90.49 ACQUIRED ABSENCE OF OTHER SPECIFIED PARTS OF DIGESTIVE TRACT: Chronic | ICD-10-CM

## 2019-11-13 LAB — BLD GP AB SCN SERPL QL: SIGNIFICANT CHANGE UP

## 2019-11-13 PROCEDURE — 38792 RA TRACER ID OF SENTINL NODE: CPT | Mod: RT

## 2019-11-13 PROCEDURE — 88333 PATH CONSLTJ SURG CYTO XM 1: CPT | Mod: 26

## 2019-11-13 PROCEDURE — 86901 BLOOD TYPING SEROLOGIC RH(D): CPT

## 2019-11-13 PROCEDURE — 19301 PARTIAL MASTECTOMY: CPT | Mod: 50

## 2019-11-13 PROCEDURE — A9541: CPT

## 2019-11-13 PROCEDURE — 19366: CPT | Mod: 50

## 2019-11-13 PROCEDURE — C1889: CPT

## 2019-11-13 PROCEDURE — 88307 TISSUE EXAM BY PATHOLOGIST: CPT

## 2019-11-13 PROCEDURE — 88307 TISSUE EXAM BY PATHOLOGIST: CPT | Mod: 26

## 2019-11-13 PROCEDURE — 88333 PATH CONSLTJ SURG CYTO XM 1: CPT

## 2019-11-13 PROCEDURE — 38525 BIOPSY/REMOVAL LYMPH NODES: CPT | Mod: RT

## 2019-11-13 PROCEDURE — 38792 RA TRACER ID OF SENTINL NODE: CPT

## 2019-11-13 PROCEDURE — 86850 RBC ANTIBODY SCREEN: CPT

## 2019-11-13 PROCEDURE — 86900 BLOOD TYPING SEROLOGIC ABO: CPT

## 2019-11-13 PROCEDURE — 36415 COLL VENOUS BLD VENIPUNCTURE: CPT

## 2019-11-13 RX ORDER — VANCOMYCIN HCL 1 G
1000 VIAL (EA) INTRAVENOUS ONCE
Refills: 0 | Status: COMPLETED | OUTPATIENT
Start: 2019-11-13 | End: 2019-11-13

## 2019-11-13 RX ORDER — SODIUM CHLORIDE 9 MG/ML
1000 INJECTION, SOLUTION INTRAVENOUS
Refills: 0 | Status: DISCONTINUED | OUTPATIENT
Start: 2019-11-13 | End: 2019-11-13

## 2019-11-13 RX ORDER — ONDANSETRON 8 MG/1
4 TABLET, FILM COATED ORAL ONCE
Refills: 0 | Status: DISCONTINUED | OUTPATIENT
Start: 2019-11-13 | End: 2019-11-13

## 2019-11-13 RX ORDER — FENTANYL CITRATE 50 UG/ML
50 INJECTION INTRAVENOUS
Refills: 0 | Status: DISCONTINUED | OUTPATIENT
Start: 2019-11-13 | End: 2019-11-13

## 2019-11-13 RX ORDER — OXYCODONE HYDROCHLORIDE 5 MG/1
1 TABLET ORAL
Qty: 16 | Refills: 0
Start: 2019-11-13 | End: 2019-11-16

## 2019-11-13 RX ADMIN — Medication 250 MILLIGRAM(S): at 08:01

## 2019-11-13 NOTE — BRIEF OPERATIVE NOTE - NSICDXBRIEFPOSTOP_GEN_ALL_CORE_FT
POST-OP DIAGNOSIS:  Breast cancer, right 13-Nov-2019 11:12:19  Linda Smith
POST-OP DIAGNOSIS:  Breast cancer, right 13-Nov-2019 11:12:19  Linda Smith

## 2019-11-13 NOTE — ASU DISCHARGE PLAN (ADULT/PEDIATRIC) - CARE PROVIDER_API CALL
Nicolas De La Vega)  Plastic Surgery; Surgery; Surgery of the Hand  [address], [address]  [place] [address]  Phone: [phone]  Fax: [phone]  Follow Up Time:     Karla Cruz)  Surgery  [address] [place] [address]  Phone: [phone]  Fax: [phone]  Follow Up Time:

## 2019-11-13 NOTE — ASU DISCHARGE PLAN (ADULT/PEDIATRIC) - ASU DC SPECIAL INSTRUCTIONSFT
WOUND CARE: Apply clean gauze and paper tape over drain site once a day. You may remove dressing in 48 hours. Do not peel off the steri strips, they will fall off on their own.   BATHING: Please do not submerge wound underwater. You may shower and/or sponge bathe after 24 hours.   ACTIVITY: No heavy lifting or straining. Otherwise, you may return to your usual level of physical activity. If you are taking narcotic pain medication (such as oxycodone) DO NOT drive a car, operate machinery or make important decisions.  DIET: Return to your usual diet.  NOTIFY YOUR SURGEON IF: You have any bleeding that does not stop, any pus draining from your wound(s), any fever (over 100.4 F) or chills, persistent nausea/vomiting, persistent diarrhea, or if your pain is not controlled on your discharge pain medications.  FOLLOW-UP: Please follow up with Dr. De La Vega in 1 week to remove your drains. Follow-up with Dr. Cruz in 2 weeks. Call for appointment upon discharge.   Please be sure to empty your drains twice a day and record the outputs. You should let Dr. De La Vega know if the outputs are more than 200-300 ml in 12 hours.

## 2019-11-13 NOTE — BRIEF OPERATIVE NOTE - NSICDXBRIEFPROCEDURE_GEN_ALL_CORE_FT
PROCEDURES:  Mastectomy, total, bilateral 13-Nov-2019 11:11:28  Linda Smith
PROCEDURES:  Biopsy, breast and sentinel lymph node 13-Nov-2019 11:11:39  Linda Smith  Mastectomy, total, bilateral 13-Nov-2019 11:11:28  Linda Smith

## 2019-11-13 NOTE — ASU DISCHARGE PLAN (ADULT/PEDIATRIC) - CALL YOUR DOCTOR IF YOU HAVE ANY OF THE FOLLOWING:
Wound/Surgical Site with redness, or foul smelling discharge or pus/Pain not relieved by Medications/Swelling that gets worse/Nausea and vomiting that does not stop/Numbness, tingling, color or temperature change to extremity/Bleeding that does not stop

## 2019-11-13 NOTE — BRIEF OPERATIVE NOTE - NSICDXBRIEFPREOP_GEN_ALL_CORE_FT
PRE-OP DIAGNOSIS:  Breast cancer, right 13-Nov-2019 11:12:09  Linda Smith
PRE-OP DIAGNOSIS:  Breast cancer, right 13-Nov-2019 11:12:09  Linda Smith

## 2019-11-13 NOTE — BRIEF OPERATIVE NOTE - OPERATION/FINDINGS
hemostasis confirmed
wc 1 . bilateral skin sparing mastectomy for R breast cancer.  SNL biopsy x 2 performed, frozen section negative nodes.   Case turned over to Dr. De La Vega for oncplastic closure

## 2019-11-19 LAB — SURGICAL PATHOLOGY STUDY: SIGNIFICANT CHANGE UP

## 2019-11-22 ENCOUNTER — APPOINTMENT (OUTPATIENT)
Dept: PLASTIC SURGERY | Facility: CLINIC | Age: 54
End: 2019-11-22
Payer: COMMERCIAL

## 2019-11-22 VITALS
WEIGHT: 150 LBS | BODY MASS INDEX: 28.32 KG/M2 | TEMPERATURE: 98.4 F | HEIGHT: 61 IN | HEART RATE: 86 BPM | OXYGEN SATURATION: 100 % | SYSTOLIC BLOOD PRESSURE: 131 MMHG | DIASTOLIC BLOOD PRESSURE: 80 MMHG | RESPIRATION RATE: 14 BRPM

## 2019-11-22 PROCEDURE — 99024 POSTOP FOLLOW-UP VISIT: CPT

## 2019-11-22 NOTE — ASSESSMENT
[FreeTextEntry1] : 55 yo female s/p onchoplastic closure 11/13/19\par CYNTHIA drains 1 and 3 removed today without difficulty \par CYNTHIA drains 2 and 4 remain in place \par monitor for redness/swellin/fevers

## 2019-11-22 NOTE — PHYSICAL EXAM
[NI] : Normal [de-identified] : b/l chest wall flat, with no palpable fluid collections or signs of infection or ecchymosis\par incisions are c/d/i, CYNTHIA jason in place on suction  [de-identified] : b

## 2019-11-22 NOTE — HISTORY OF PRESENT ILLNESS
[FreeTextEntry1] : 55 yo female with a history of right DCIS now s/p b/l onchoplastic closure 11/13/19.  Doing well\par Denies fever, chills, LE pain/edema \par CYNTHIA drains \par 1 - 20 \par 2 - 45\par 3 - 20\par 4 - 45\par

## 2019-11-26 ENCOUNTER — APPOINTMENT (OUTPATIENT)
Dept: PLASTIC SURGERY | Facility: CLINIC | Age: 54
End: 2019-11-26
Payer: COMMERCIAL

## 2019-11-26 VITALS
HEIGHT: 61 IN | OXYGEN SATURATION: 100 % | HEART RATE: 82 BPM | DIASTOLIC BLOOD PRESSURE: 78 MMHG | TEMPERATURE: 98 F | BODY MASS INDEX: 26.43 KG/M2 | SYSTOLIC BLOOD PRESSURE: 127 MMHG | RESPIRATION RATE: 16 BRPM | WEIGHT: 140 LBS

## 2019-11-26 PROCEDURE — 99024 POSTOP FOLLOW-UP VISIT: CPT

## 2019-11-26 NOTE — ASSESSMENT
[FreeTextEntry1] : 53 yo female s/p onchoplastic closure 11/13/19\par CYNTHIA drains 2/4 left in place due to output, discussed with Dr. De La Vega\par

## 2019-11-26 NOTE — PHYSICAL EXAM
[NI] : Normal [de-identified] : b/l chest wall flat, with no palpable fluid collections or signs of infection or ecchymosis\par incisions are c/d/i, CYNTHIA jason in place on suction

## 2019-11-26 NOTE — HISTORY OF PRESENT ILLNESS
[FreeTextEntry1] : 53 yo female with a history of right DCIS now s/p b/l onchoplastic closure 11/13/19. pt is doing well\par denies fever, chills, LE pain/edema\par CYNTHIA drains\par 2- 40\par 4- 45

## 2019-12-02 ENCOUNTER — APPOINTMENT (OUTPATIENT)
Dept: SURGERY | Facility: CLINIC | Age: 54
End: 2019-12-02
Payer: COMMERCIAL

## 2019-12-02 VITALS
SYSTOLIC BLOOD PRESSURE: 125 MMHG | DIASTOLIC BLOOD PRESSURE: 70 MMHG | BODY MASS INDEX: 27.42 KG/M2 | TEMPERATURE: 97.9 F | HEIGHT: 62 IN | WEIGHT: 149 LBS

## 2019-12-02 DIAGNOSIS — Z00.00 ENCOUNTER FOR GENERAL ADULT MEDICAL EXAMINATION W/OUT ABNORMAL FINDINGS: ICD-10-CM

## 2019-12-02 DIAGNOSIS — Z86.000 PERSONAL HISTORY OF IN-SITU NEOPLASM OF BREAST: ICD-10-CM

## 2019-12-02 PROCEDURE — 99024 POSTOP FOLLOW-UP VISIT: CPT

## 2019-12-02 NOTE — HISTORY OF PRESENT ILLNESS
[FreeTextEntry1] : I had the pleasure of seeing Nicky Tanner in the office for a post operative visit. She is a weston 55 yo postmenopausal female s/p bilateral mastectomy with right sentinel lymph node biopsy for DCIS ER90% GA- high grade.\par \par Pathology: 11/13/2019\par Right sentinel lymph node 10/3\par Right breast: Ductal carcinoma in situ with high grade atypia and comedonecrosis. It is multifocal and extensive present in multiple quadrants and measures approximately 5cm in aggregate. Negative margins\par Left breast: Short superior long lateral- Biopsy site related changes. Histologically unremarkable nipple areolar complex and skin \par \par She complains of pain at left CYNTHIA site but denies drainage from surgical site or fever. \par \par We reviewed post operative instructions and she understands that she may resume activities of daily living. \par \par We also discussed genetic testing and she understands we will attempt authorization from insurance.\par \par All questions were answered.

## 2019-12-02 NOTE — PHYSICAL EXAM
[Normocephalic] : normocephalic [Atraumatic] : atraumatic [EOMI] : extra ocular movement intact [Sclera nonicteric] : sclera nonicteric [PERRL] : pupils equal, round and reactive to light [No Supraclavicular Adenopathy] : no supraclavicular adenopathy [Supple] : supple [Examined in the supine and seated position] : examined in the supine and seated position [No dominant masses] : no dominant masses in right breast  [No dominant masses] : no dominant masses left breast [No Nipple Retraction] : no left nipple retraction [No Nipple Discharge] : no left nipple discharge [No Axillary Lymphadenopathy] : no right axillary lymphadenopathy [No Ulceration] : no ulceration [No Rashes] : no rashes [No Edema] : no edema [de-identified] : Mastectomy flap without changes. No drainage. CYNTHIA drain removed. \par  [de-identified] : Mastectomy flap without changes. No drainage. CYNTHIA drain removed. \par \par

## 2019-12-02 NOTE — PHYSICAL EXAM
[Normocephalic] : normocephalic [Atraumatic] : atraumatic [EOMI] : extra ocular movement intact [PERRL] : pupils equal, round and reactive to light [Sclera nonicteric] : sclera nonicteric [Supple] : supple [No Supraclavicular Adenopathy] : no supraclavicular adenopathy [Examined in the supine and seated position] : examined in the supine and seated position [No dominant masses] : no dominant masses in right breast  [No dominant masses] : no dominant masses left breast [No Nipple Retraction] : no left nipple retraction [No Nipple Discharge] : no left nipple discharge [No Axillary Lymphadenopathy] : no left axillary lymphadenopathy [No Ulceration] : no ulceration [No Rashes] : no rashes [No Edema] : no edema [de-identified] : Mastectomy flap without changes. No drainage. CYNTHIA drain removed. \par  [de-identified] : Mastectomy flap without changes. No drainage. CYNTHIA drain removed. \par \par

## 2019-12-02 NOTE — ASSESSMENT
[FreeTextEntry1] : 55 yo postmenopausal female s/p bilateral mastectomy with right sentinel lymph node biopsy and oncoplastic closure presents for a post operative visit and review of final pathology.  Stage 0 right breast cancer identified measuring 5cm with clear margins. 0/3 sentinel nodes. No evidence of invasive carcinoma.\par 1. Follow up in 4 months for clinical examination. \par 2. Provide authorization for genetic testing

## 2019-12-02 NOTE — HISTORY OF PRESENT ILLNESS
[FreeTextEntry1] : I had the pleasure of seeing Nicky Tanner in the office for a post operative visit. She is a weston 55 yo postmenopausal female s/p bilateral mastectomy with right sentinel lymph node biopsy for DCIS ER90% [place]- high grade.\par \par Pathology: 11/13/2019\par Right sentinel lymph node 10/3\par Right breast: Ductal carcinoma in situ with high grade atypia and comedonecrosis. It is multifocal and extensive present in multiple quadrants and measures approximately 5cm in aggregate. Negative margins\par Left breast: Short superior long lateral- Biopsy site related changes. Histologically unremarkable nipple areolar complex and skin \par \par She complains of pain at left CYNTHIA site but denies drainage from surgical site or fever. \par \par We reviewed post operative instructions and she understands that she may resume activities of daily living. \par \par We also discussed genetic testing and she understands we will attempt authorization from insurance.\par \par All questions were answered.

## 2019-12-02 NOTE — ASSESSMENT
[FreeTextEntry1] : 53 yo postmenopausal female s/p bilateral mastectomy with right sentinel lymph node biopsy and oncoplastic closure presents for a post operative visit and review of final pathology.  Stage 0 right breast cancer identified measuring 5cm with clear margins. 0/3 sentinel nodes. No evidence of invasive carcinoma.\par 1. Follow up in 4 months for clinical examination. \par 2. Provide authorization for genetic testing

## 2019-12-03 ENCOUNTER — APPOINTMENT (OUTPATIENT)
Dept: PLASTIC SURGERY | Facility: CLINIC | Age: 54
End: 2019-12-03
Payer: COMMERCIAL

## 2019-12-03 VITALS
OXYGEN SATURATION: 97 % | DIASTOLIC BLOOD PRESSURE: 82 MMHG | HEIGHT: 62 IN | BODY MASS INDEX: 27.6 KG/M2 | RESPIRATION RATE: 16 BRPM | HEART RATE: 93 BPM | SYSTOLIC BLOOD PRESSURE: 124 MMHG | WEIGHT: 150 LBS | TEMPERATURE: 98.3 F

## 2019-12-03 PROCEDURE — 99024 POSTOP FOLLOW-UP VISIT: CPT

## 2019-12-03 NOTE — ASSESSMENT
[FreeTextEntry1] : 55 yo female sp onchoplastic closure 11/13/19\par Doing well, no palpable fluid collections noted \par discussed with pt returning to work if she feels comfortable, no heavy lifting more then 10 lbs, start ROM exercises for shoulders \par monitor for fever, chills, redness, swelling, increased pain

## 2019-12-03 NOTE — PHYSICAL EXAM
[de-identified] : b/l chest wall flat, with no palpable fluid collections or signs of infection or ecchymosis\par incisions are c/d/i [NI] : Normal

## 2019-12-03 NOTE — HISTORY OF PRESENT ILLNESS
[FreeTextEntry1] : 55 yo female with a history of right DCIS now s/p b/l onchoplastic closure 11/13/19. pt is doing well\par denies fever, chills, LE pain/edema\par Dr. Cruz removed pts drains at follow up visit yesterday (12/2/19)

## 2019-12-06 ENCOUNTER — APPOINTMENT (OUTPATIENT)
Dept: GASTROENTEROLOGY | Facility: CLINIC | Age: 54
End: 2019-12-06
Payer: COMMERCIAL

## 2019-12-06 VITALS
SYSTOLIC BLOOD PRESSURE: 147 MMHG | BODY MASS INDEX: 26.87 KG/M2 | WEIGHT: 146 LBS | DIASTOLIC BLOOD PRESSURE: 83 MMHG | RESPIRATION RATE: 16 BRPM | HEIGHT: 62 IN | HEART RATE: 84 BPM | OXYGEN SATURATION: 99 %

## 2019-12-06 PROCEDURE — 99203 OFFICE O/P NEW LOW 30 MIN: CPT

## 2019-12-06 NOTE — ASSESSMENT
[FreeTextEntry1] : Prior symptoms of heartburn and regurgitation have completely resolved in the last 3 weeks since breast surgery.\par No upper GI alarm symptoms. Nothing to do for the time being. Watch and wait. Should reflux symptoms redeveloped then consider for upper endoscopy.\par \par Family history is negative for colorectal neoplasia. Physical exam is normal. As such. Patient is at average risk for development of colorectal neoplasia. Therefore, a screening colonoscopy was offered to the patient. The procedure, its risks, benefits, and prepped, were explained to the patient, who understands and is agreeable to proceed. Supraclavicular is less divided dose. No blood work necessary as current blood work from recent surgery as will normal. Plan for elective screening colonoscopy in the summer of 2020.

## 2019-12-06 NOTE — REASON FOR VISIT
[Consultation] : a consultation visit [FreeTextEntry1] : Heartburn for 2 years. Screening colonoscopy.

## 2019-12-06 NOTE — CONSULT LETTER
[Dear  ___] : Dear  [unfilled], [Consult Letter:] : I had the pleasure of evaluating your patient, [unfilled]. [Please see my note below.] : Please see my note below. [Consult Closing:] : Thank you very much for allowing me to participate in the care of this patient.  If you have any questions, please do not hesitate to contact me. [Sincerely,] : Sincerely, [FreeTextEntry1] : Resolved reflux symptoms. Nothing to do appear\par Hemorrhages for development of colorectal cancer. Screening colonoscopy to be arranged. [FreeTextEntry3] : Dany Mathews MD FACG\par Diplomate American Board of Internal Medicine and Gastroenterolgy\par [facility]\par

## 2019-12-06 NOTE — HISTORY OF PRESENT ILLNESS
[FreeTextEntry1] : 54-year-old white female with a surgical history for a , and this was recently diagnosed with DCIS and has undergone bilateral mastectomy 3 weeks ago uneventfully. Patient has made a good recovery, thus far. No active issues.\par Patient did ports have been acid reflux with heartburn and regurgitation. For about 2 years. Almost on a daily basis. Since her breast surgery there has been no change in diet or significant weight loss. No new medications. Patient to take Prilosec once daily for 3 days and currently denies having any dysphagia or diet aphasia, dyspepsia, nausea, or vomiting. Currently feels well from upper GI point of view. No complaints.\par \par Family history is negative for colorectal neoplasia. No prior screening colonoscopy. Patient reports her bowel movements are normal. Occurring regularly and there is been no rectal bleeding. No abdominal pain or unexpected weight loss. No history of anemia or unexpected diarrhea or constipation.

## 2019-12-13 ENCOUNTER — APPOINTMENT (OUTPATIENT)
Dept: BREAST CENTER | Facility: CLINIC | Age: 54
End: 2019-12-13
Payer: COMMERCIAL

## 2019-12-13 VITALS
DIASTOLIC BLOOD PRESSURE: 76 MMHG | HEIGHT: 62 IN | OXYGEN SATURATION: 98 % | HEART RATE: 77 BPM | TEMPERATURE: 97.8 F | SYSTOLIC BLOOD PRESSURE: 136 MMHG | BODY MASS INDEX: 26.69 KG/M2 | WEIGHT: 145.02 LBS

## 2019-12-13 PROCEDURE — 99024 POSTOP FOLLOW-UP VISIT: CPT

## 2019-12-13 NOTE — PHYSICAL EXAM
[Atraumatic] : atraumatic [Normocephalic] : normocephalic [EOMI] : extra ocular movement intact [PERRL] : pupils equal, round and reactive to light [Sclera nonicteric] : sclera nonicteric [Supple] : supple [No dominant masses] : no dominant masses in right breast  [Examined in the supine and seated position] : examined in the supine and seated position [No Supraclavicular Adenopathy] : no supraclavicular adenopathy [No dominant masses] : no dominant masses left breast [No Nipple Discharge] : no right nipple discharge [No Nipple Retraction] : no left nipple retraction [No Axillary Lymphadenopathy] : no left axillary lymphadenopathy [No Edema] : no edema [No Ulceration] : no ulceration [No Rashes] : no rashes [de-identified] : Mastectomy flap without changes. No drainage. CYNTHIA drain removed. \par  [de-identified] : Mastectomy flap without changes. No drainage. CYNTHIA drain removed. \par \par

## 2019-12-13 NOTE — ASSESSMENT
[FreeTextEntry1] : 55 yo postmenopausal female s/p bilateral mastectomy with right sentinel lymph node biopsy and oncoplastic closure presents for a post operative visit and review of final pathology.  Stage 0 right breast cancer identified measuring 5cm with clear margins. 0/3 sentinel nodes. No evidence of invasive carcinoma.\par 1. Follow up in 4 months for clinical examination. \par 2. Follow up genetic testing

## 2019-12-13 NOTE — HISTORY OF PRESENT ILLNESS
[FreeTextEntry1] : I had the pleasure of seeing Nicky Tanner in the office for a post operative visit. She is a weston 55 yo postmenopausal female s/p bilateral mastectomy with right sentinel lymph node biopsy for DCIS ER90% ND- high grade.\par \par Pathology: 11/13/2019\par Right sentinel lymph node 10/3\par Right breast: Ductal carcinoma in situ with high grade atypia and comedonecrosis. It is multifocal and extensive present in multiple quadrants and measures approximately 5cm in aggregate. Negative margins\par Left breast: Short superior long lateral- Biopsy site related changes. Histologically unremarkable nipple areolar complex and skin \par \par She complains of pain at left CYNTHIA site but denies drainage from surgical site or fever. \par \par We reviewed post operative instructions and she understands that she may resume activities of daily living. \par \par We also discussed genetic testing and she submitted blood for testing.\par \par All questions were answered.

## 2019-12-24 ENCOUNTER — APPOINTMENT (OUTPATIENT)
Dept: PLASTIC SURGERY | Facility: CLINIC | Age: 54
End: 2019-12-24
Payer: COMMERCIAL

## 2019-12-24 VITALS
WEIGHT: 145 LBS | DIASTOLIC BLOOD PRESSURE: 92 MMHG | TEMPERATURE: 98.1 F | HEIGHT: 62 IN | OXYGEN SATURATION: 99 % | BODY MASS INDEX: 26.68 KG/M2 | HEART RATE: 78 BPM | SYSTOLIC BLOOD PRESSURE: 150 MMHG

## 2019-12-24 PROCEDURE — 99024 POSTOP FOLLOW-UP VISIT: CPT

## 2019-12-24 NOTE — HISTORY OF PRESENT ILLNESS
[FreeTextEntry1] : 55 yo female with a history of right DCIS now s/p b/l onchoplastic closure 11/13/19. pt is doing well\par denies fever, chills, LE pain/edema\par Pt was concerned because she noticed an area of swelling in her right axilla which she feels has been present since surgery

## 2019-12-24 NOTE — PHYSICAL EXAM
[NI] : Normal [de-identified] : b/l chest wall flat, with no palpable fluid collections or signs of infection or ecchymosis\par incisions are c/d/i\par right scar contracted laterally causing a dimpling in the upper skin flap, mild swelling in the righ axilla, soft

## 2019-12-24 NOTE — ASSESSMENT
[FreeTextEntry1] : 53 yo female s/p b/l oncoplastic closure 11/13/19, doing well \par discussed with pt today to massage the area of swelling as well as her incisions with moisturizer to see if the scar contracture and swelling improves \par discussed with pt that if the swelling is still present after massages an ultrasound can be performed to r/o any fluid collections \par monitor for increasing swelling, fever, redness \par

## 2020-01-14 ENCOUNTER — APPOINTMENT (OUTPATIENT)
Dept: PLASTIC SURGERY | Facility: CLINIC | Age: 55
End: 2020-01-14
Payer: COMMERCIAL

## 2020-01-14 VITALS
SYSTOLIC BLOOD PRESSURE: 124 MMHG | HEART RATE: 80 BPM | BODY MASS INDEX: 27.05 KG/M2 | RESPIRATION RATE: 16 BRPM | TEMPERATURE: 97.8 F | OXYGEN SATURATION: 98 % | DIASTOLIC BLOOD PRESSURE: 76 MMHG | WEIGHT: 147 LBS | HEIGHT: 62 IN

## 2020-01-14 PROCEDURE — 99024 POSTOP FOLLOW-UP VISIT: CPT

## 2020-01-24 ENCOUNTER — APPOINTMENT (OUTPATIENT)
Dept: INTERNAL MEDICINE | Facility: CLINIC | Age: 55
End: 2020-01-24
Payer: COMMERCIAL

## 2020-01-24 VITALS
HEIGHT: 62 IN | SYSTOLIC BLOOD PRESSURE: 130 MMHG | OXYGEN SATURATION: 98 % | WEIGHT: 142 LBS | DIASTOLIC BLOOD PRESSURE: 78 MMHG | HEART RATE: 78 BPM | BODY MASS INDEX: 26.13 KG/M2

## 2020-01-24 DIAGNOSIS — K21.9 GASTRO-ESOPHAGEAL REFLUX DISEASE W/OUT ESOPHAGITIS: ICD-10-CM

## 2020-01-24 DIAGNOSIS — Z13.220 ENCOUNTER FOR SCREENING FOR LIPOID DISORDERS: ICD-10-CM

## 2020-01-24 PROCEDURE — 36415 COLL VENOUS BLD VENIPUNCTURE: CPT

## 2020-01-24 PROCEDURE — 99213 OFFICE O/P EST LOW 20 MIN: CPT | Mod: 25

## 2020-01-25 LAB
ALBUMIN SERPL ELPH-MCNC: 4.8 G/DL
ALP BLD-CCNC: 62 U/L
ALT SERPL-CCNC: 17 U/L
ANION GAP SERPL CALC-SCNC: 18 MMOL/L
AST SERPL-CCNC: 20 U/L
BASOPHILS # BLD AUTO: 0.06 K/UL
BASOPHILS NFR BLD AUTO: 1.1 %
BILIRUB SERPL-MCNC: 0.7 MG/DL
BUN SERPL-MCNC: 16 MG/DL
CALCIUM SERPL-MCNC: 10 MG/DL
CHLORIDE SERPL-SCNC: 100 MMOL/L
CHOLEST SERPL-MCNC: 222 MG/DL
CHOLEST/HDLC SERPL: 2.7 RATIO
CO2 SERPL-SCNC: 24 MMOL/L
CREAT SERPL-MCNC: 0.95 MG/DL
EOSINOPHIL # BLD AUTO: 0.25 K/UL
EOSINOPHIL NFR BLD AUTO: 4.4 %
GLUCOSE SERPL-MCNC: 99 MG/DL
HCT VFR BLD CALC: 41 %
HDLC SERPL-MCNC: 82 MG/DL
HGB BLD-MCNC: 12.9 G/DL
IMM GRANULOCYTES NFR BLD AUTO: 0.2 %
LDLC SERPL CALC-MCNC: 124 MG/DL
LYMPHOCYTES # BLD AUTO: 1.77 K/UL
LYMPHOCYTES NFR BLD AUTO: 31 %
MAN DIFF?: NORMAL
MCHC RBC-ENTMCNC: 29.6 PG
MCHC RBC-ENTMCNC: 31.5 GM/DL
MCV RBC AUTO: 94 FL
MONOCYTES # BLD AUTO: 0.34 K/UL
MONOCYTES NFR BLD AUTO: 6 %
NEUTROPHILS # BLD AUTO: 3.28 K/UL
NEUTROPHILS NFR BLD AUTO: 57.3 %
PLATELET # BLD AUTO: 329 K/UL
POTASSIUM SERPL-SCNC: 4.5 MMOL/L
PROT SERPL-MCNC: 8 G/DL
RBC # BLD: 4.36 M/UL
RBC # FLD: 13.1 %
SODIUM SERPL-SCNC: 142 MMOL/L
TRIGL SERPL-MCNC: 83 MG/DL
WBC # FLD AUTO: 5.71 K/UL

## 2020-01-25 NOTE — ASSESSMENT
[FreeTextEntry1] : -Followup labs drawn in the office today\par -Followup with Dr. Cruz\par -RTO 6 months or sooner if needed (Needs Annual well check)

## 2020-01-25 NOTE — HISTORY OF PRESENT ILLNESS
[FreeTextEntry1] : follow up GERD [de-identified] : -PMH: Ductal Carcinoma Insitu Right Breast (Dx June 2019. S/p b/l Mastectomy 11/13/19.)\par -SH: . Dentist. 2 daughters.\par \par VASILE is a 53 year F whom is here today for follow up GERD\par Since last f/u patient is now s/p b/l Mastectomy on 11/13/19.\par She has followed up w/ GI on 12/6/19. GERD has resolved. She consulted about colonoscopy but still needs to schuled a date\par \par Today, pt reports that she is feeling well. She tolerated surgery well. She is w/o questions or concerns today

## 2020-01-27 ENCOUNTER — TRANSCRIPTION ENCOUNTER (OUTPATIENT)
Age: 55
End: 2020-01-27

## 2020-01-27 ENCOUNTER — RESULT REVIEW (OUTPATIENT)
Age: 55
End: 2020-01-27

## 2020-03-06 ENCOUNTER — APPOINTMENT (OUTPATIENT)
Dept: BREAST CENTER | Facility: CLINIC | Age: 55
End: 2020-03-06
Payer: COMMERCIAL

## 2020-03-06 PROCEDURE — 99214 OFFICE O/P EST MOD 30 MIN: CPT

## 2020-03-09 NOTE — ASSESSMENT
[FreeTextEntry1] : 53 yo postmenopausal female s/p bilateral mastectomy with right sentinel lymph node biopsy and oncoplastic closure presents for a post operative visit and review of final pathology.  Stage 0 right breast cancer identified measuring 5cm with clear margins. 0/3 sentinel nodes. No evidence of invasive carcinoma.  She felt a left breast mass and there is a subcentimeter mass at the mastectomy flap surgical site which is highly suspicious for fat necrosis.  Recommendation for:\par 1. Follow up after imaging\par 2. Left breast ultrasound

## 2020-03-09 NOTE — PHYSICAL EXAM
[Normocephalic] : normocephalic [Atraumatic] : atraumatic [EOMI] : extra ocular movement intact [PERRL] : pupils equal, round and reactive to light [Sclera nonicteric] : sclera nonicteric [Supple] : supple [No Supraclavicular Adenopathy] : no supraclavicular adenopathy [Examined in the supine and seated position] : examined in the supine and seated position [No dominant masses] : no dominant masses in right breast  [No dominant masses] : no dominant masses left breast [No Nipple Retraction] : no left nipple retraction [No Nipple Discharge] : no left nipple discharge [No Axillary Lymphadenopathy] : no left axillary lymphadenopathy [No Edema] : no edema [No Rashes] : no rashes [No Ulceration] : no ulceration [de-identified] : Mastectomy flap without changes. No drainage. CYNTHIA drain removed. \par  [de-identified] : Mastectomy flap without changes. No drainage. Subcentimeter left breast mass at mastectomy surgical site.\par \par

## 2020-03-09 NOTE — HISTORY OF PRESENT ILLNESS
[FreeTextEntry1] : I had the pleasure of seeing Nicky Tanner in the office for an acute visit secondary to left breast nodule at surgical site. \par \par She is a weston 55 yo postmenopausal female s/p bilateral mastectomy with right sentinel lymph node biopsy for DCIS ER90% CT- high grade.\par \par Pathology: 11/13/2019\par Right sentinel lymph node 10/3\par Right breast: Ductal carcinoma in situ with high grade atypia and comedonecrosis. It is multifocal and extensive present in multiple quadrants and measures approximately 5cm in aggregate. Negative margins\par Left breast: Short superior long lateral- Biopsy site related changes. Histologically unremarkable nipple areolar complex and skin \par \par She has a palpable subcentimeter mass at the left mastectomy surgical site which is highly suspicious for fat necrosis.  She denies pain to the area.  Recommendation for left breast ultrasound and will follow up after imaging to review results.  She understands and agrees to plan.\par \par All questions were answered.

## 2020-03-13 ENCOUNTER — OUTPATIENT (OUTPATIENT)
Dept: OUTPATIENT SERVICES | Facility: HOSPITAL | Age: 55
LOS: 1 days | End: 2020-03-13
Payer: COMMERCIAL

## 2020-03-13 ENCOUNTER — APPOINTMENT (OUTPATIENT)
Dept: ULTRASOUND IMAGING | Facility: CLINIC | Age: 55
End: 2020-03-13
Payer: COMMERCIAL

## 2020-03-13 DIAGNOSIS — N63.20 UNSPECIFIED LUMP IN THE LEFT BREAST, UNSPECIFIED QUADRANT: ICD-10-CM

## 2020-03-13 DIAGNOSIS — Z98.891 HISTORY OF UTERINE SCAR FROM PREVIOUS SURGERY: Chronic | ICD-10-CM

## 2020-03-13 DIAGNOSIS — Z90.49 ACQUIRED ABSENCE OF OTHER SPECIFIED PARTS OF DIGESTIVE TRACT: Chronic | ICD-10-CM

## 2020-03-13 PROCEDURE — 76642 ULTRASOUND BREAST LIMITED: CPT | Mod: 26,LT

## 2020-03-13 PROCEDURE — 76642 ULTRASOUND BREAST LIMITED: CPT

## 2020-03-26 NOTE — ASSESSMENT
[FreeTextEntry1] : S/p bilateral mastectomy ( V45.7) ( Z90.13)\par 53 yo female s/p b/l oncoplastic closure 11/13/19, doing well \par discussed with pt today to continue to massage area no further surgical intervention desired scheduled  to follow up with Dr Cruz. \par  \par . \par

## 2020-03-26 NOTE — HISTORY OF PRESENT ILLNESS
[FreeTextEntry1] : 53 yo female with a history of right DCIS now s/p b/l oncoplastic closure 11/13/19. pt is doing well,  denies fever, chills, LE pain/edema  Pt  is healing well no further intervention desired

## 2020-03-29 PROBLEM — D05.11 DUCTAL CARCINOMA IN SITU (DCIS) OF RIGHT BREAST: Status: ACTIVE | Noted: 2019-12-02

## 2020-03-29 NOTE — HISTORY OF PRESENT ILLNESS
[FreeTextEntry1] : 55 yo woman with DCIS presents to discuss reconstruction to formulate a final reconstructive plan.  The patient is planning lumpectomy with Dr. Karla Cruz.

## 2020-04-05 NOTE — ASSESSMENT
[FreeTextEntry1] : 54 y/o female with newly diagnosed right breast DCIS. Her surgical plan with Dr. Cruz is bilateral mastectomy. \par The patient was counseled about reconstructive options following mastectomy, including autologous, prosthetic and the options of foregoing reconstruction. Additionally, she was explained the options regarding the timing of reconstruction, including immediate reconstruction at the time of mastectomy or delayed reconstruction at a later date.\par The patient was extensively counseled on the operation and the perioperative recoveries of both autologous and prosthetic reconstructions. The patient understands the risks with abdominally based microsurgical reconstruction including partial or total flap loss, revisit to the operating room in the chad-operative period, wound dehiscence, mastectomy skin flap necrosis, fat necrosis, abdominal wall morbidity (laxity, bulge, hernia), asymmetry, paresthesia, seroma, hematoma, and infection. She also understands the risks with implant-based reconstruction including infection, implant malposition, extrusion, deflation, capsular contracture, mastectomy skin flap necrosis, wound dehiscence, asymmetry, seroma, paresthesia, and hematoma. \par After a long discussion the patient has elected to undergo\par

## 2020-04-05 NOTE — HISTORY OF PRESENT ILLNESS
[FreeTextEntry1] : 52 y/o female with newly diagnosed right breast DCIS. Presents today for reconstructive consult. \par Patient denies smoking.\par Works as a dentist.\par She has two children, both C-sections,  x2.\par \par Hx of previous appendectomy.\par She denies any heart problems or any previous blood clots. Denies being on any blood thinners.\par No FMHx of bleeding disorders or early breast cancer.\par Allergies to PCN- develops a rash.\par \par

## 2020-04-05 NOTE — PHYSICAL EXAM
[NI] : Normal [de-identified] : See breast examination sheet.\par AP was present during the exam\par

## 2020-04-13 ENCOUNTER — APPOINTMENT (OUTPATIENT)
Dept: SURGERY | Facility: CLINIC | Age: 55
End: 2020-04-13

## 2020-07-31 NOTE — H&P PST ADULT - WEIGHT IN LBS
Pt called to discuss the need for MRI   Reviewed that it has been over 6 mo since her last MRI and it would be helpful to have a f/u study but not essential   She states that she will go for the MRI and see me on 8/10  I encouraged her to call with questions.      
149.9

## 2020-08-03 ENCOUNTER — APPOINTMENT (OUTPATIENT)
Dept: SURGERY | Facility: CLINIC | Age: 55
End: 2020-08-03

## 2020-12-23 PROBLEM — Z13.220 ENCOUNTER FOR SCREENING FOR LIPID DISORDER: Status: RESOLVED | Noted: 2020-01-24 | Resolved: 2020-12-23

## 2021-07-07 ENCOUNTER — APPOINTMENT (OUTPATIENT)
Dept: SURGERY | Facility: CLINIC | Age: 56
End: 2021-07-07
Payer: COMMERCIAL

## 2021-07-07 VITALS
BODY MASS INDEX: 24.66 KG/M2 | TEMPERATURE: 97.2 F | WEIGHT: 134 LBS | DIASTOLIC BLOOD PRESSURE: 78 MMHG | HEART RATE: 74 BPM | OXYGEN SATURATION: 96 % | SYSTOLIC BLOOD PRESSURE: 129 MMHG | HEIGHT: 62 IN

## 2021-07-07 DIAGNOSIS — Z90.13 ACQUIRED ABSENCE OF BILATERAL BREASTS AND NIPPLES: ICD-10-CM

## 2021-07-07 PROCEDURE — 99213 OFFICE O/P EST LOW 20 MIN: CPT

## 2021-07-07 NOTE — HISTORY OF PRESENT ILLNESS
[FreeTextEntry1] : I had the pleasure of seeing Nicky Tanner in the office for a clinical breast evaluation.  She has a histiry of right breast high grade DCIS ER 90% WA negative.\par \par She is a weston 54 yo postmenopausal female s/p bilateral mastectomy with right sentinel lymph node biopsy for DCIS ER90% WA- high grade without evidence of recurrence. \par \par Pathology: 11/13/2019\par Right sentinel lymph node 10/3\par Right breast: Ductal carcinoma in situ with high grade atypia and comedonecrosis. It is multifocal and extensive present in multiple quadrants and measures approximately 5cm in aggregate. Negative margins\par Left breast: Short superior long lateral- Biopsy site related changes. Histologically unremarkable nipple areolar complex and skin \par \par There is no evidence of recurrence and she denies new headaches, blurry vision, SOB, chest pain, abdominal pain, \par \par She understands that surveillance will consist primarily of clinical evaluation and imaging based on symptoms. \par All questions were answered.

## 2021-07-07 NOTE — ASSESSMENT
[FreeTextEntry1] : 54 yo postmenopausal female with a history of right  breast DCIS (high grade ER 90% AR negative) and underwent bilateral mastectomy. There is no evidence of recurrence of new breast cancer.\par 1. Clinical examination in 6 months\par 2. Imaging based on symptoms

## 2022-02-17 NOTE — ASU DISCHARGE PLAN (ADULT/PEDIATRIC) - ***IN THE EVENT THAT YOU DEVELOP A COMPLICATION AND YOU ARE UNABLE TO REACH YOUR OWN PHYSICIAN, YOU MAY CONTACT:
Statement Selected Solaraze Counseling:  I discussed with the patient the risks of Solaraze including but not limited to erythema, scaling, itching, weeping, crusting, and pain.

## 2022-03-18 NOTE — H&P PST ADULT - PSYCHIATRIC
[FreeTextEntry1] : Discussed at length with the patient the planned Monovisc injection. The risks, benefits, convalescence and alternatives were reviewed. The possible side effects discussed included but were not limited to: pain, swelling, heat and redness. There symptoms are generally mild but if they are extensive then contact the office. \par \par Following the discussion, the knee was prepped with Betadine and under sterile technique the Monovisc injection was performed. The needle was introduced into the joint, aspiration was performed to ensure intra-articular placement and the medication was injected. Upon withdrawal of the needle the site was cleaned with alcohol and a Band-Aid applied. The patient tolerated the injection well and there were no adverse effects. Post injection instructions included not strenuous activity for 24 hours, cryotherapy and if there were any adverse effects to contact the office.\par  negative Affect and characteristics of appearance, verbalizations, behaviors are appropriate

## 2024-01-19 ENCOUNTER — LABORATORY RESULT (OUTPATIENT)
Age: 59
End: 2024-01-19

## 2024-01-19 ENCOUNTER — APPOINTMENT (OUTPATIENT)
Dept: INTERNAL MEDICINE | Facility: CLINIC | Age: 59
End: 2024-01-19
Payer: COMMERCIAL

## 2024-01-19 ENCOUNTER — NON-APPOINTMENT (OUTPATIENT)
Age: 59
End: 2024-01-19

## 2024-01-19 VITALS
WEIGHT: 145 LBS | HEART RATE: 77 BPM | SYSTOLIC BLOOD PRESSURE: 126 MMHG | TEMPERATURE: 97.3 F | BODY MASS INDEX: 26.68 KG/M2 | HEIGHT: 62 IN | OXYGEN SATURATION: 99 % | DIASTOLIC BLOOD PRESSURE: 62 MMHG

## 2024-01-19 DIAGNOSIS — Z82.49 FAMILY HISTORY OF ISCHEMIC HEART DISEASE AND OTHER DISEASES OF THE CIRCULATORY SYSTEM: ICD-10-CM

## 2024-01-19 DIAGNOSIS — Z83.49 FAMILY HISTORY OF OTHER ENDOCRINE, NUTRITIONAL AND METABOLIC DISEASES: ICD-10-CM

## 2024-01-19 DIAGNOSIS — R10.9 UNSPECIFIED ABDOMINAL PAIN: ICD-10-CM

## 2024-01-19 DIAGNOSIS — Z83.511 FAMILY HISTORY OF GLAUCOMA: ICD-10-CM

## 2024-01-19 PROCEDURE — 93000 ELECTROCARDIOGRAM COMPLETE: CPT

## 2024-01-19 PROCEDURE — 99386 PREV VISIT NEW AGE 40-64: CPT | Mod: 25

## 2024-01-19 RX ORDER — CHOLECALCIFEROL (VITAMIN D3) 1250 MCG
1.25 MG CAPSULE ORAL
Refills: 0 | Status: DISCONTINUED | COMMUNITY
Start: 2019-10-16 | End: 2024-01-19

## 2024-01-19 RX ORDER — SODIUM SULFATE, POTASSIUM SULFATE, MAGNESIUM SULFATE 17.5; 3.13; 1.6 G/ML; G/ML; G/ML
17.5-3.13-1.6 SOLUTION, CONCENTRATE ORAL
Qty: 1 | Refills: 0 | Status: DISCONTINUED | COMMUNITY
Start: 2019-12-06 | End: 2024-01-19

## 2024-01-19 NOTE — HISTORY OF PRESENT ILLNESS
[de-identified] : VASILE SNYDER is a 58 year F who presents today to establish with [facility] @ [place]. She is here for an Annual Physical Exam. She has a past medical history of DCIS and is s/p bilateral mastectomies.  She is feeling well at today's visit. However, she does report several yrs of right sided abdominal discomfort. 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 It started before the COVID pandemic..

## 2024-01-19 NOTE — HEALTH RISK ASSESSMENT
[2 - 3 times a week (3 pts)] : 2 - 3  times a week (3 points) [1 or 2 (0 pts)] : 1 or 2 (0 points) [Never (0 pts)] : Never (0 points) [No] : In the past 12 months have you used drugs other than those required for medical reasons? No [0] : 2) Feeling down, depressed, or hopeless: Not at all (0) [PHQ-2 Positive] : PHQ-2 Positive [Patient reported PAP Smear was normal] : Patient reported PAP Smear was normal [Patient reported bone density results were normal] : Patient reported bone density results were normal [# of Members in Household ___] :  household currently consist of [unfilled] member(s) [Employed] : employed [Graduate School] : graduate school [# Of Children ___] : has [unfilled] children [Fully functional (bathing, dressing, toileting, transferring, walking, feeding)] : Fully functional (bathing, dressing, toileting, transferring, walking, feeding) [Fully functional (using the telephone, shopping, preparing meals, housekeeping, doing laundry, using] : Fully functional and needs no help or supervision to perform IADLs (using the telephone, shopping, preparing meals, housekeeping, doing laundry, using transportation, managing medications and managing finances) [Smoke Detector] : smoke detector [Carbon Monoxide Detector] : carbon monoxide detector [Seat Belt] :  uses seat belt [Never] : Never [Yes] : Yes [PHQ-2 Negative - No further assessment needed] : PHQ-2 Negative - No further assessment needed [Audit-CScore] : 3 [de-identified] :  4 X weekly lifts weights, sit-ups, walks 4 x weekly [de-identified] : Unrestricted Adult Diet- [[id number]] : 0 [With Family] : lives with family [MammogramComments] : s/p bilateral mastectomies [PapSmearDate] : 11/2023 [PapSmearComments] : Dr. Villarreal - [BoneDensityDate] : 11/2023 [BoneDensityComments] : as per patient [ColonoscopyComments] : never screened- referred Dr. 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

## 2024-01-19 NOTE — PHYSICAL EXAM
[No Acute Distress] : no acute distress [Well Nourished] : well nourished [Well Developed] : well developed [Well-Appearing] : well-appearing [Normal Sclera/Conjunctiva] : normal sclera/conjunctiva [PERRL] : pupils equal round and reactive to light [EOMI] : extraocular movements intact [Normal Outer Ear/Nose] : the outer ears and nose were normal in appearance [Normal Oropharynx] : the oropharynx was normal [No JVD] : no jugular venous distention [No Lymphadenopathy] : no lymphadenopathy [Supple] : supple [Thyroid Normal, No Nodules] : the thyroid was normal and there were no nodules present [No Respiratory Distress] : no respiratory distress  [No Accessory Muscle Use] : no accessory muscle use [Clear to Auscultation] : lungs were clear to auscultation bilaterally [Normal Rate] : normal rate  [Regular Rhythm] : with a regular rhythm [Normal S1, S2] : normal S1 and S2 [No Murmur] : no murmur heard [No Carotid Bruits] : no carotid bruits [No Abdominal Bruit] : a ~M bruit was not heard ~T in the abdomen [No Varicosities] : no varicosities [Pedal Pulses Present] : the pedal pulses are present [No Edema] : there was no peripheral edema [No Palpable Aorta] : no palpable aorta [No Extremity Clubbing/Cyanosis] : no extremity clubbing/cyanosis [Soft] : abdomen soft [Non Tender] : non-tender [Non-distended] : non-distended [No Masses] : no abdominal mass palpated [No HSM] : no HSM [Normal Bowel Sounds] : normal bowel sounds [Normal Posterior Cervical Nodes] : no posterior cervical lymphadenopathy [Normal Anterior Cervical Nodes] : no anterior cervical lymphadenopathy [No CVA Tenderness] : no CVA  tenderness [No Spinal Tenderness] : no spinal tenderness [No Joint Swelling] : no joint swelling [Grossly Normal Strength/Tone] : grossly normal strength/tone [No Rash] : no rash [Coordination Grossly Intact] : coordination grossly intact [No Focal Deficits] : no focal deficits [Normal Gait] : normal gait [Deep Tendon Reflexes (DTR)] : deep tendon reflexes were 2+ and symmetric [Speech Grossly Normal] : speech grossly normal [Normal Affect] : the affect was normal [Normal Mood] : the mood was normal [Normal Insight/Judgement] : insight and judgment were intact [Normal Supraclavicular Nodes] : no supraclavicular lymphadenopathy [de-identified] : bilateral mastectomies, no axillary masses palpated [de-identified] : right sided non-inflammed sebacous cyst with visible pore

## 2024-01-30 LAB
ALBUMIN SERPL ELPH-MCNC: 4.3 G/DL
ALP BLD-CCNC: 62 U/L
ALT SERPL-CCNC: 19 U/L
ANION GAP SERPL CALC-SCNC: 14 MMOL/L
APPEARANCE: CLEAR
AST SERPL-CCNC: 22 U/L
BASOPHILS # BLD AUTO: 0.06 K/UL
BASOPHILS NFR BLD AUTO: 1 %
BILIRUB SERPL-MCNC: 0.3 MG/DL
BILIRUBIN URINE: NEGATIVE
BLOOD URINE: NEGATIVE
BUN SERPL-MCNC: 13 MG/DL
CALCIUM SERPL-MCNC: 9.3 MG/DL
CHLORIDE SERPL-SCNC: 98 MMOL/L
CHOLEST SERPL-MCNC: 198 MG/DL
CO2 SERPL-SCNC: 26 MMOL/L
COLOR: YELLOW
CREAT SERPL-MCNC: 0.92 MG/DL
EGFR: 71 ML/MIN/1.73M2
EOSINOPHIL # BLD AUTO: 0.13 K/UL
EOSINOPHIL NFR BLD AUTO: 2.1 %
GLUCOSE QUALITATIVE U: NEGATIVE MG/DL
GLUCOSE SERPL-MCNC: 95 MG/DL
HCT VFR BLD CALC: 33.8 %
HDLC SERPL-MCNC: 64 MG/DL
HGB BLD-MCNC: 10.6 G/DL
IMM GRANULOCYTES NFR BLD AUTO: 0.3 %
KETONES URINE: NEGATIVE MG/DL
LDLC SERPL CALC-MCNC: 116 MG/DL
LEUKOCYTE ESTERASE URINE: ABNORMAL
LYMPHOCYTES # BLD AUTO: 3.25 K/UL
LYMPHOCYTES NFR BLD AUTO: 52.3 %
MAN DIFF?: NORMAL
MCHC RBC-ENTMCNC: 31.4 GM/DL
MCHC RBC-ENTMCNC: 32.3 PG
MCV RBC AUTO: 103 FL
MONOCYTES # BLD AUTO: 0.39 K/UL
MONOCYTES NFR BLD AUTO: 6.3 %
NEUTROPHILS # BLD AUTO: 2.37 K/UL
NEUTROPHILS NFR BLD AUTO: 38 %
NITRITE URINE: NEGATIVE
NONHDLC SERPL-MCNC: 135 MG/DL
PH URINE: 6
PLATELET # BLD AUTO: 303 K/UL
POTASSIUM SERPL-SCNC: 4.6 MMOL/L
PROT SERPL-MCNC: 9.2 G/DL
PROTEIN URINE: NEGATIVE MG/DL
RBC # BLD: 3.28 M/UL
RBC # FLD: 14.8 %
SODIUM SERPL-SCNC: 138 MMOL/L
SPECIFIC GRAVITY URINE: 1.01
TRIGL SERPL-MCNC: 108 MG/DL
TSH SERPL-ACNC: 2.61 UIU/ML
UROBILINOGEN URINE: 0.2 MG/DL
WBC # FLD AUTO: 6.22 K/UL

## 2024-01-31 ENCOUNTER — LABORATORY RESULT (OUTPATIENT)
Age: 59
End: 2024-01-31

## 2024-02-08 LAB
ALBUMIN MFR SERPL ELPH: 43.6 %
ALBUMIN SERPL ELPH-MCNC: 4.3 G/DL
ALBUMIN SERPL-MCNC: 4.1 G/DL
ALBUMIN/GLOB SERPL: 0.8 RATIO
ALP BLD-CCNC: 56 U/L
ALPHA1 GLOB MFR SERPL ELPH: 2.1 %
ALPHA1 GLOB SERPL ELPH-MCNC: 0.2 G/DL
ALPHA2 GLOB MFR SERPL ELPH: 5.8 %
ALPHA2 GLOB SERPL ELPH-MCNC: 0.5 G/DL
ALT SERPL-CCNC: 19 U/L
ANION GAP SERPL CALC-SCNC: 16 MMOL/L
AST SERPL-CCNC: 23 U/L
B-GLOBULIN MFR SERPL ELPH: 43.1 %
B-GLOBULIN SERPL ELPH-MCNC: 4 G/DL
BASOPHILS # BLD AUTO: 0.06 K/UL
BASOPHILS NFR BLD AUTO: 0.8 %
BILIRUB SERPL-MCNC: 0.5 MG/DL
BUN SERPL-MCNC: 17 MG/DL
CALCIUM SERPL-MCNC: 9.6 MG/DL
CHLORIDE SERPL-SCNC: 97 MMOL/L
CO2 SERPL-SCNC: 27 MMOL/L
CREAT SERPL-MCNC: 1.03 MG/DL
EGFR: 62 ML/MIN/1.73M2
EOSINOPHIL # BLD AUTO: 0.19 K/UL
EOSINOPHIL NFR BLD AUTO: 2.7 %
FERRITIN SERPL-MCNC: 200 NG/ML
FOLATE SERPL-MCNC: >20 NG/ML
GAMMA GLOB FLD ELPH-MCNC: 0.5 G/DL
GAMMA GLOB MFR SERPL ELPH: 5.4 %
GLUCOSE SERPL-MCNC: 62 MG/DL
HCT VFR BLD CALC: 32.5 %
HGB BLD-MCNC: 10.5 G/DL
IMM GRANULOCYTES NFR BLD AUTO: 0.3 %
INTERPRETATION SERPL IEP-IMP: NORMAL
IRON SATN MFR SERPL: 17 %
IRON SERPL-MCNC: 55 UG/DL
LYMPHOCYTES # BLD AUTO: 2.63 K/UL
LYMPHOCYTES NFR BLD AUTO: 37 %
M PROTEIN MFR SERPL ELPH: NORMAL
MAN DIFF?: NORMAL
MCHC RBC-ENTMCNC: 31.3 PG
MCHC RBC-ENTMCNC: 32.3 GM/DL
MCV RBC AUTO: 97 FL
MONOCLON BAND OBS SERPL: NORMAL
MONOCYTES # BLD AUTO: 0.4 K/UL
MONOCYTES NFR BLD AUTO: 5.6 %
NEUTROPHILS # BLD AUTO: 3.81 K/UL
NEUTROPHILS NFR BLD AUTO: 53.6 %
PLATELET # BLD AUTO: 317 K/UL
POTASSIUM SERPL-SCNC: 4.7 MMOL/L
PROT SERPL-MCNC: 9.3 G/DL
RBC # BLD: 3.35 M/UL
RBC # FLD: 14.5 %
SODIUM SERPL-SCNC: 139 MMOL/L
TIBC SERPL-MCNC: 324 UG/DL
UIBC SERPL-MCNC: 270 UG/DL
VIT B12 SERPL-MCNC: 419 PG/ML
WBC # FLD AUTO: 7.11 K/UL

## 2024-02-26 ENCOUNTER — OUTPATIENT (OUTPATIENT)
Dept: OUTPATIENT SERVICES | Facility: HOSPITAL | Age: 59
LOS: 1 days | Discharge: HOME | End: 2024-02-26

## 2024-02-26 DIAGNOSIS — D47.2 MONOCLONAL GAMMOPATHY: ICD-10-CM

## 2024-02-26 DIAGNOSIS — Z98.891 HISTORY OF UTERINE SCAR FROM PREVIOUS SURGERY: Chronic | ICD-10-CM

## 2024-02-26 DIAGNOSIS — D64.9 ANEMIA, UNSPECIFIED: ICD-10-CM

## 2024-02-26 DIAGNOSIS — Z90.49 ACQUIRED ABSENCE OF OTHER SPECIFIED PARTS OF DIGESTIVE TRACT: Chronic | ICD-10-CM

## 2024-02-28 DIAGNOSIS — Z87.440 PERSONAL HISTORY OF URINARY (TRACT) INFECTIONS: ICD-10-CM

## 2024-02-28 DIAGNOSIS — Z85.3 PERSONAL HISTORY OF MALIGNANT NEOPLASM OF BREAST: ICD-10-CM

## 2024-02-28 DIAGNOSIS — Z00.00 ENCOUNTER FOR GENERAL ADULT MEDICAL EXAMINATION W/OUT ABNORMAL FINDINGS: ICD-10-CM

## 2024-02-28 DIAGNOSIS — Z87.898 PERSONAL HISTORY OF OTHER SPECIFIED CONDITIONS: ICD-10-CM

## 2024-02-28 DIAGNOSIS — R93.89 ABNORMAL FINDINGS ON DIAGNOSTIC IMAGING OF OTHER SPECIFIED BODY STRUCTURES: ICD-10-CM

## 2024-02-28 DIAGNOSIS — R92.8 OTHER ABNORMAL AND INCONCLUSIVE FINDINGS ON DIAGNOSTIC IMAGING OF BREAST: ICD-10-CM

## 2024-02-28 DIAGNOSIS — Z90.13 ACQUIRED ABSENCE OF BILATERAL BREASTS AND NIPPLES: ICD-10-CM

## 2024-02-28 DIAGNOSIS — Z12.11 ENCOUNTER FOR SCREENING FOR MALIGNANT NEOPLASM OF COLON: ICD-10-CM

## 2024-02-28 DIAGNOSIS — R77.8 OTHER SPECIFIED ABNORMALITIES OF PLASMA PROTEINS: ICD-10-CM

## 2024-02-28 DIAGNOSIS — Z86.2 PERSONAL HISTORY OF DISEASES OF THE BLOOD AND BLOOD-FORMING ORGANS AND CERTAIN DISORDERS INVOLVING THE IMMUNE MECHANISM: ICD-10-CM

## 2024-02-28 DIAGNOSIS — R77.9 ABNORMALITY OF PLASMA PROTEIN, UNSPECIFIED: ICD-10-CM

## 2024-02-29 ENCOUNTER — RESULT REVIEW (OUTPATIENT)
Age: 59
End: 2024-02-29

## 2024-02-29 ENCOUNTER — APPOINTMENT (OUTPATIENT)
Dept: HEMATOLOGY ONCOLOGY | Facility: CLINIC | Age: 59
End: 2024-02-29
Payer: COMMERCIAL

## 2024-02-29 ENCOUNTER — NON-APPOINTMENT (OUTPATIENT)
Age: 59
End: 2024-02-29

## 2024-02-29 VITALS
SYSTOLIC BLOOD PRESSURE: 172 MMHG | RESPIRATION RATE: 16 BRPM | OXYGEN SATURATION: 98 % | HEIGHT: 61.1 IN | TEMPERATURE: 97.8 F | DIASTOLIC BLOOD PRESSURE: 90 MMHG | HEART RATE: 100 BPM | BODY MASS INDEX: 27.47 KG/M2 | WEIGHT: 145.48 LBS

## 2024-02-29 DIAGNOSIS — Z87.898 PERSONAL HISTORY OF OTHER SPECIFIED CONDITIONS: ICD-10-CM

## 2024-02-29 LAB
ALBUMIN SERPL ELPH-MCNC: 4.4 G/DL
ALP BLD-CCNC: 54 U/L
ALT SERPL-CCNC: 16 U/L
ANION GAP SERPL CALC-SCNC: 16 MMOL/L
AST SERPL-CCNC: 20 U/L
B2 MICROGLOB SERPL-MCNC: 2 MG/L
BASOPHILS # BLD AUTO: 0.03 K/UL — SIGNIFICANT CHANGE UP (ref 0–0.2)
BASOPHILS NFR BLD AUTO: 0.6 % — SIGNIFICANT CHANGE UP (ref 0–2)
BILIRUB SERPL-MCNC: 0.5 MG/DL
BUN SERPL-MCNC: 18 MG/DL
CALCIUM SERPL-MCNC: 9.9 MG/DL
CHLORIDE SERPL-SCNC: 100 MMOL/L
CO2 SERPL-SCNC: 24 MMOL/L
CREAT SERPL-MCNC: 1.01 MG/DL
EGFR: 64 ML/MIN/1.73M2
EOSINOPHIL # BLD AUTO: 0.07 K/UL — SIGNIFICANT CHANGE UP (ref 0–0.5)
EOSINOPHIL NFR BLD AUTO: 1.4 % — SIGNIFICANT CHANGE UP (ref 0–6)
GLUCOSE SERPL-MCNC: 131 MG/DL
HCT VFR BLD CALC: 33.6 % — LOW (ref 34.5–45)
HGB BLD-MCNC: 11 G/DL — LOW (ref 11.5–15.5)
IMM GRANULOCYTES NFR BLD AUTO: 0.2 % — SIGNIFICANT CHANGE UP (ref 0–0.9)
LDH SERPL-CCNC: 130 U/L
LYMPHOCYTES # BLD AUTO: 1.65 K/UL — SIGNIFICANT CHANGE UP (ref 1–3.3)
LYMPHOCYTES # BLD AUTO: 33.3 % — SIGNIFICANT CHANGE UP (ref 13–44)
MCHC RBC-ENTMCNC: 31.6 PG — SIGNIFICANT CHANGE UP (ref 27–34)
MCHC RBC-ENTMCNC: 32.7 G/DL — SIGNIFICANT CHANGE UP (ref 32–36)
MCV RBC AUTO: 96.6 FL — SIGNIFICANT CHANGE UP (ref 80–100)
MONOCYTES # BLD AUTO: 0.24 K/UL — SIGNIFICANT CHANGE UP (ref 0–0.9)
MONOCYTES NFR BLD AUTO: 4.8 % — SIGNIFICANT CHANGE UP (ref 2–14)
NEUTROPHILS # BLD AUTO: 2.96 K/UL — SIGNIFICANT CHANGE UP (ref 1.8–7.4)
NEUTROPHILS NFR BLD AUTO: 59.7 % — SIGNIFICANT CHANGE UP (ref 43–77)
NRBC # BLD: 0 /100 WBCS — SIGNIFICANT CHANGE UP (ref 0–0)
PLATELET # BLD AUTO: 302 K/UL — SIGNIFICANT CHANGE UP (ref 150–400)
POTASSIUM SERPL-SCNC: 4.6 MMOL/L
PROT SERPL-MCNC: 10 G/DL
RBC # BLD: 3.48 M/UL — LOW (ref 3.8–5.2)
RBC # FLD: 14.8 % — HIGH (ref 10.3–14.5)
SODIUM SERPL-SCNC: 140 MMOL/L
WBC # BLD: 4.96 K/UL — SIGNIFICANT CHANGE UP (ref 3.8–10.5)
WBC # FLD AUTO: 4.96 K/UL — SIGNIFICANT CHANGE UP (ref 3.8–10.5)

## 2024-02-29 PROCEDURE — 99205 OFFICE O/P NEW HI 60 MIN: CPT

## 2024-02-29 RX ORDER — SULFAMETHOXAZOLE AND TRIMETHOPRIM 800; 160 MG/1; MG/1
800-160 TABLET ORAL TWICE DAILY
Qty: 6 | Refills: 0 | Status: DISCONTINUED | COMMUNITY
Start: 2024-01-30 | End: 2024-02-29

## 2024-02-29 RX ORDER — BACILLUS COAGULANS/INULIN 1B-250 MG
CAPSULE ORAL
Refills: 0 | Status: ACTIVE | COMMUNITY

## 2024-02-29 NOTE — HISTORY OF PRESENT ILLNESS
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  She recently established care with a new PMD Dr. Cathy Real. She had not followed up with anyone since 2020 due to the COVID pandemic. She only reports an occasional sciatica-like pain in the right hip. Otherwise, she has not had any symptoms.  Routine labs from an annual visit with her primary care physician on 1/19/24 revealed macrocytic anemia (Hgb 10.6,  - new from 2020) and an elevated protein gap (TProtein 9.2, albumin 4.3). No renal dysfunction or hypercalcemia. Paraprotein evaluation was notable for 2 IgA lambda bands (M-spike 1: 3.1, M-spike 2: 0.2). She had normal iron, B12, and thyroid studies. No proteinuria.   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 - Has 2 children, one lives in [place] and the other in [place] - Never smoked tobacco. Drinks 1-2 glasses of wine maybe twice a week. No illicit drug use

## 2024-02-29 NOTE — ASSESSMENT
[FreeTextEntry1] : VASILE SNYDER is a 58 year woman with PMH of DCIS (s/p bilateral mastectomies), who presents for Hematology evaluation of IgA lambda monoclonal gammopathy.  # IgA lambda monoclonal gammopathy: Routine labs from an annual visit with her primary care physician on 1/19/24 revealed macrocytic anemia (Hgb 10.6,  - new from 2020) and an elevated protein gap (TProtein 9.2, albumin 4.3). No renal dysfunction or hypercalcemia. Paraprotein evaluation was notable for 2 IgA lambda bands (M-spike 1: 3.1, M-spike 2: 0.2). She had normal iron, B12, and thyroid studies. No proteinuria. We will send a full paraprotein work up today. Suspect she has at least smoldering multiple myeloma given M-spike > 3.  - CBC and CMP - LDH and B2-microglobulin - Serum LOREN, SPEP, quantitative immunoglobulins, free light chain assay - UPEP and LOREN - Will arrange bone marrow biopsy with Clonoseq testing - PET/CT scan to evaluate for lytic lesions - Patient should return to clinic after the biopsy and PET to discuss the results

## 2024-03-01 ENCOUNTER — RESULT REVIEW (OUTPATIENT)
Age: 59
End: 2024-03-01

## 2024-03-01 ENCOUNTER — LABORATORY RESULT (OUTPATIENT)
Age: 59
End: 2024-03-01

## 2024-03-01 ENCOUNTER — APPOINTMENT (OUTPATIENT)
Dept: HEMATOLOGY ONCOLOGY | Facility: CLINIC | Age: 59
End: 2024-03-01
Payer: COMMERCIAL

## 2024-03-01 VITALS
TEMPERATURE: 97.5 F | BODY MASS INDEX: 27.36 KG/M2 | OXYGEN SATURATION: 100 % | HEART RATE: 96 BPM | RESPIRATION RATE: 16 BRPM | DIASTOLIC BLOOD PRESSURE: 83 MMHG | WEIGHT: 145.26 LBS | SYSTOLIC BLOOD PRESSURE: 168 MMHG

## 2024-03-01 LAB
BASOPHILS # BLD AUTO: 0.03 K/UL — SIGNIFICANT CHANGE UP (ref 0–0.2)
BASOPHILS NFR BLD AUTO: 0.4 % — SIGNIFICANT CHANGE UP (ref 0–2)
EOSINOPHIL # BLD AUTO: 0.04 K/UL — SIGNIFICANT CHANGE UP (ref 0–0.5)
EOSINOPHIL NFR BLD AUTO: 0.5 % — SIGNIFICANT CHANGE UP (ref 0–6)
HCT VFR BLD CALC: 31 % — LOW (ref 34.5–45)
HGB BLD-MCNC: 10.5 G/DL — LOW (ref 11.5–15.5)
IMM GRANULOCYTES NFR BLD AUTO: 0.5 % — SIGNIFICANT CHANGE UP (ref 0–0.9)
LYMPHOCYTES # BLD AUTO: 1.63 K/UL — SIGNIFICANT CHANGE UP (ref 1–3.3)
LYMPHOCYTES # BLD AUTO: 21.3 % — SIGNIFICANT CHANGE UP (ref 13–44)
MCHC RBC-ENTMCNC: 31.8 PG — SIGNIFICANT CHANGE UP (ref 27–34)
MCHC RBC-ENTMCNC: 33.9 G/DL — SIGNIFICANT CHANGE UP (ref 32–36)
MCV RBC AUTO: 93.9 FL — SIGNIFICANT CHANGE UP (ref 80–100)
MONOCYTES # BLD AUTO: 0.4 K/UL — SIGNIFICANT CHANGE UP (ref 0–0.9)
MONOCYTES NFR BLD AUTO: 5.2 % — SIGNIFICANT CHANGE UP (ref 2–14)
NEUTROPHILS # BLD AUTO: 5.52 K/UL — SIGNIFICANT CHANGE UP (ref 1.8–7.4)
NEUTROPHILS NFR BLD AUTO: 72.1 % — SIGNIFICANT CHANGE UP (ref 43–77)
NRBC # BLD: 0 /100 WBCS — SIGNIFICANT CHANGE UP (ref 0–0)
PLATELET # BLD AUTO: 271 K/UL — SIGNIFICANT CHANGE UP (ref 150–400)
RBC # BLD: 3.3 M/UL — LOW (ref 3.8–5.2)
RBC # FLD: 14.6 % — HIGH (ref 10.3–14.5)
WBC # BLD: 7.66 K/UL — SIGNIFICANT CHANGE UP (ref 3.8–10.5)
WBC # FLD AUTO: 7.66 K/UL — SIGNIFICANT CHANGE UP (ref 3.8–10.5)

## 2024-03-01 PROCEDURE — 38221 DX BONE MARROW BIOPSIES: CPT | Mod: LT

## 2024-03-01 NOTE — REASON FOR VISIT
[Bone Marrow Biopsy] : bone marrow biopsy [FreeTextEntry2] : MGUS rule out Mulitple Myeloma [Bone Marrow Aspiration] : bone marrow aspiration

## 2024-03-01 NOTE — PROCEDURE
[Bone Marrow Aspiration] : bone marrow aspiration  [Bone Marrow Biopsy] : bone marrow biopsy [Patient] : the patient [Verbal Consent Obtained] : verbal consent was obtained prior to the procedure [Patient identification verified] : patient identification verified [Procedure verified and consent obtained] : procedure verified and consent obtained [Laterality verified and correct site marked] : laterality verified and correct site marked [Left] : site: left [Correct positioning] : correct positioning [Correct implant and/ or special equipment obtained] : correct impact and/ or special equipment obtained [Prone] : prone [Superior iliac spine was identified] : the superior iliac spine was identified. [The left posterior iliac crest was prepped with betadine and draped, using sterile technique.] : The left posterior iliac crest was prepped with betadine and draped, using sterile technique. [Lidocaine was injected and into the periosteum overlying the site.] : Lidocaine was injected and into the periosteum overlying the site. [Aspirate] : aspirate [Cytogenetics] : cytogenetics [FISH] : FISH [Biopsy] : biopsy [Other ___] : [unfilled] [] : The patient was instructed to remove the bandage the following AM. The patient may bathe. Acetaminophen may be taken for discomfort, as per package directions.If there are any other problems, the patient was instructed to call the office. The patient verbalized understanding, and is aware of the office contact numbers. [Flow Cytometry] : flow cytometry [FreeTextEntry1] : MGUS rule out Mulitple Myeloma [FreeTextEntry2] :   8cc of  1% lidocaine was used for the procedure.   WBC:  7.66 K/uL Hgb:   10.5 g/dL Hct:   31  % Plts:   271  K/uL   Bone marrow aspiration and biopsy were done. Multiple Myeloma panel and Congo Red Staining requested. [employer] sent TRACKING NUMBER: [id number]

## 2024-03-04 LAB
ALBUMIN MFR SERPL ELPH: 42.5 %
ALBUMIN SERPL-MCNC: 4.2 G/DL
ALBUMIN/GLOB SERPL: 0.7 RATIO
ALBUPE: 42.8 %
ALPHA1 GLOB MFR SERPL ELPH: 2.2 %
ALPHA1 GLOB SERPL ELPH-MCNC: 0.2 G/DL
ALPHA1UPE: 21.9 %
ALPHA2 GLOB MFR SERPL ELPH: 5.9 %
ALPHA2 GLOB SERPL ELPH-MCNC: 0.6 G/DL
ALPHA2UPE: 14.8 %
B-GLOBULIN MFR SERPL ELPH: 44.2 %
B-GLOBULIN SERPL ELPH-MCNC: 4.4 G/DL
BETAUPE: 14.4 %
DEPRECATED KAPPA LC FREE/LAMBDA SER: 1.16 RATIO
GAMMA GLOB FLD ELPH-MCNC: 0.5 G/DL
GAMMA GLOB MFR SERPL ELPH: 5.2 %
GAMMAUPE: 6.1 %
IGA 24H UR QL IFE: NORMAL
IGA SER QL IEP: 4555 MG/DL
IGG SER QL IEP: 468 MG/DL
IGM SER QL IEP: 15 MG/DL
INTERPRETATION SERPL IEP-IMP: NORMAL
KAPPA LC 24H UR QL: NORMAL
KAPPA LC CSF-MCNC: 0.56 MG/DL
KAPPA LC SERPL-MCNC: 0.65 MG/DL
M PROTEIN MFR SERPL ELPH: NORMAL
M PROTEIN SPEC IFE-MCNC: NORMAL
MONOCLON BAND OBS SERPL: NORMAL
PROT PATTERN 24H UR ELPH-IMP: NORMAL
PROT SERPL-MCNC: 9.9 G/DL
PROT SERPL-MCNC: 9.9 G/DL
PROT UR-MCNC: 8 MG/DL
PROT UR-MCNC: 8 MG/DL

## 2024-03-15 ENCOUNTER — APPOINTMENT (OUTPATIENT)
Dept: NUCLEAR MEDICINE | Facility: CLINIC | Age: 59
End: 2024-03-15
Payer: COMMERCIAL

## 2024-03-15 ENCOUNTER — OUTPATIENT (OUTPATIENT)
Dept: OUTPATIENT SERVICES | Facility: HOSPITAL | Age: 59
LOS: 1 days | End: 2024-03-15

## 2024-03-15 DIAGNOSIS — D47.2 MONOCLONAL GAMMOPATHY: ICD-10-CM

## 2024-03-15 DIAGNOSIS — Z90.49 ACQUIRED ABSENCE OF OTHER SPECIFIED PARTS OF DIGESTIVE TRACT: Chronic | ICD-10-CM

## 2024-03-15 DIAGNOSIS — Z98.891 HISTORY OF UTERINE SCAR FROM PREVIOUS SURGERY: Chronic | ICD-10-CM

## 2024-03-15 PROCEDURE — 78816 PET IMAGE W/CT FULL BODY: CPT | Mod: 26,PI

## 2024-03-20 ENCOUNTER — APPOINTMENT (OUTPATIENT)
Dept: HEMATOLOGY ONCOLOGY | Facility: CLINIC | Age: 59
End: 2024-03-20
Payer: COMMERCIAL

## 2024-03-20 DIAGNOSIS — D64.9 ANEMIA, UNSPECIFIED: ICD-10-CM

## 2024-03-20 DIAGNOSIS — Z86.03 PERSONAL HISTORY OF NEOPLASM OF UNCERTAIN BEHAVIOR: ICD-10-CM

## 2024-03-20 PROCEDURE — G2211 COMPLEX E/M VISIT ADD ON: CPT

## 2024-03-20 PROCEDURE — 99215 OFFICE O/P EST HI 40 MIN: CPT

## 2024-03-20 NOTE — ASSESSMENT
[FreeTextEntry1] : VASILE SNYDER is a 58 year woman with PMH of DCIS (s/p bilateral mastectomies), who presents for Hematology follow up of IgA lambda multiple myeloma.  1. IgA lambda multiple myeloma:  - Diagnosis:  Routine labs from an annual visit with her primary care physician on 1/19/24 revealed macrocytic anemia (Hgb 10.6,  - new from 2020) and an elevated protein gap (TProtein 9.2, albumin 4.3). No renal dysfunction or hypercalcemia. Paraprotein evaluation was notable for two IgA lambda bands (M-spike 1: 3.3, M-spike 2: 0.2), IgA 4555 with low IgG and IgM, and normal K/L ratio. She had normal renal function, calcium, and albumin. LDH and B2-microglobulin were normal. UPEP showed an IgA lambda band. Bone marrow biopsy on 3/1/24 showed 40-60% plasma cells by  immunostaining. FISH panel showed 3 copies of FGFR3 (20%) and RB1 deletion (18.5%). She had a normal karyotype. PET/CT on 3/15/24 did not show any lytic lesions.  - Staging: R-ISS I (low-risk) - Treatment plan: Will plan to treat with Jolene-VRd per the PERSEUS trial, with Velcade modified to weekly dosing to minimize toxicity. She lives in [facility] and prefers to be treated at [facility]. Will refer to Dr. Farley to start treatment.  Daratumumab SQ weekly x 8 (C1-2), then biweekly x 8 (C3-6), then monthly (C7+) Velcade SQ 1.3 mg/m2 D1, 8, 15 Revlimid PO 25 mg D1-21 Pre-medications: dexamethasone 20 mg, Tylenol 650 mg, Benadryl 25 mg - Follow up televisit with me in 3 months - Monitor MM labs (SPEP, LOREN, free light chains, immunoglobulins) monthly - Monitor CBC and CMP with treatment   2. Bones: PET/CT on 3/15/24 did not show any lytic lesions.  - Vitamin D: Check level and replete if low - Anti-resorptive therapy: Pending dental evaluation   3. Kidney: She has normal renal function - Creatinine: 1.01 (2/29/24) - UPEP/LOREN: positive for IgA lambda band (2/29/24) - Avoid nephrotoxic agents   4. Hematology: She has mild anemia likely from her disease. - CBC: 4.96 > 11 < 302 (2/29/24) - Monitor CBC with treatment   5. Peripheral neuropathy: Not present at baseline - Monitor with Velcade treatment   6. VTE: No history of VTE. - Start aspirin 81 mg daily for prophylaxis with Revlimid treatment   7. Infections: No recent or recurrent infections. She has low IgG and IgM.  - IgG level: 468 (2/29/24) - Start acyclovir 400 mg BID for antiviral prophylaxis   8. Amyloidosis: Not suspected. Bone marrow negative for Congo red staining.

## 2024-03-20 NOTE — HISTORY OF PRESENT ILLNESS
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  She recently established care with a new PMD Dr. Cathy Real. She had not followed up with anyone since 2020 due to the COVID pandemic. She only reports an occasional sciatica-like pain in the right hip. Otherwise, she has not had any symptoms.  Routine labs from an annual visit with her primary care physician on 1/19/24 revealed macrocytic anemia (Hgb 10.6,  - new from 2020) and an elevated protein gap (TProtein 9.2, albumin 4.3). No renal dysfunction or hypercalcemia. Paraprotein evaluation was notable for 2 IgA lambda bands (M-spike 1: 3.1, M-spike 2: 0.2). She had normal iron, B12, and thyroid studies. No proteinuria.  Interval History: - She established care in our practice on 2/29/24. Her labs were notable for mild anemia (Hgb 11), two IgA lambda bands (M-spike 1: 3.3, M-spike 2: 0.2), IgA 4555 with low IgG and IgM, and normal K/L ratio. She had normal renal function, calcium, and albumin. LDH and B2-microglobulin were normal. UPEP showed an IgA lambda band. - Bone marrow biopsy on 3/1/24 showed 40-60% plasma cells by  immunostaining. FISH panel showed 3 copies of FGFR3 (20%) and RB1 deletion (18.5%). She had a normal karyotype. - PET/CT on 3/15/24 did not show any lytic lesions. There was hypermetabolic uptake within the thyroid gland, for which sonography is suggested to exclude focal hypermetabolic nodules.  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 - Has 2 children, one lives in [place] and the other in [place] - Never smoked tobacco. Drinks 1-2 glasses of wine maybe twice a week. No illicit drug use.

## 2024-03-21 RX ORDER — ONDANSETRON 8 MG/1
8 TABLET, ORALLY DISINTEGRATING ORAL
Qty: 30 | Refills: 1 | Status: ACTIVE | COMMUNITY
Start: 2024-03-21 | End: 1900-01-01

## 2024-03-21 RX ORDER — DEXAMETHASONE 4 MG/1
4 TABLET ORAL
Qty: 60 | Refills: 0 | Status: ACTIVE | COMMUNITY
Start: 2024-03-21 | End: 1900-01-01

## 2024-03-21 RX ORDER — LENALIDOMIDE 25 MG/1
25 CAPSULE ORAL DAILY
Qty: 21 | Refills: 0 | Status: ACTIVE | COMMUNITY
Start: 2024-03-21 | End: 1900-01-01

## 2024-03-22 ENCOUNTER — OUTPATIENT (OUTPATIENT)
Dept: OUTPATIENT SERVICES | Facility: HOSPITAL | Age: 59
LOS: 1 days | Discharge: HOME | End: 2024-03-22

## 2024-03-22 ENCOUNTER — NON-APPOINTMENT (OUTPATIENT)
Age: 59
End: 2024-03-22

## 2024-03-22 DIAGNOSIS — D64.9 ANEMIA, UNSPECIFIED: ICD-10-CM

## 2024-03-22 DIAGNOSIS — Z90.49 ACQUIRED ABSENCE OF OTHER SPECIFIED PARTS OF DIGESTIVE TRACT: Chronic | ICD-10-CM

## 2024-03-22 DIAGNOSIS — D47.2 MONOCLONAL GAMMOPATHY: ICD-10-CM

## 2024-03-22 DIAGNOSIS — Z98.891 HISTORY OF UTERINE SCAR FROM PREVIOUS SURGERY: Chronic | ICD-10-CM

## 2024-03-25 ENCOUNTER — RESULT REVIEW (OUTPATIENT)
Age: 59
End: 2024-03-25

## 2024-03-25 ENCOUNTER — OUTPATIENT (OUTPATIENT)
Dept: OUTPATIENT SERVICES | Facility: HOSPITAL | Age: 59
LOS: 1 days | End: 2024-03-25
Payer: COMMERCIAL

## 2024-03-25 ENCOUNTER — APPOINTMENT (OUTPATIENT)
Dept: HEMATOLOGY ONCOLOGY | Facility: CLINIC | Age: 59
End: 2024-03-25
Payer: COMMERCIAL

## 2024-03-25 VITALS
WEIGHT: 141 LBS | SYSTOLIC BLOOD PRESSURE: 166 MMHG | HEIGHT: 61 IN | BODY MASS INDEX: 26.62 KG/M2 | OXYGEN SATURATION: 98 % | HEART RATE: 101 BPM | DIASTOLIC BLOOD PRESSURE: 84 MMHG

## 2024-03-25 DIAGNOSIS — Z90.49 ACQUIRED ABSENCE OF OTHER SPECIFIED PARTS OF DIGESTIVE TRACT: Chronic | ICD-10-CM

## 2024-03-25 DIAGNOSIS — D64.9 ANEMIA, UNSPECIFIED: ICD-10-CM

## 2024-03-25 DIAGNOSIS — Z98.891 HISTORY OF UTERINE SCAR FROM PREVIOUS SURGERY: Chronic | ICD-10-CM

## 2024-03-25 DIAGNOSIS — D47.2 MONOCLONAL GAMMOPATHY: ICD-10-CM

## 2024-03-25 LAB
BASOPHILS # BLD AUTO: 0.1 K/UL — SIGNIFICANT CHANGE UP (ref 0–0.2)
BASOPHILS NFR BLD AUTO: 1 % — SIGNIFICANT CHANGE UP (ref 0–2)
BLD GP AB SCN SERPL QL: SIGNIFICANT CHANGE UP
EOSINOPHIL # BLD AUTO: 0.1 K/UL — SIGNIFICANT CHANGE UP (ref 0–0.5)
EOSINOPHIL NFR BLD AUTO: 0.9 % — SIGNIFICANT CHANGE UP (ref 0–6)
HCT VFR BLD CALC: 31.1 % — LOW (ref 34.5–45)
HGB BLD-MCNC: 10.4 G/DL — LOW (ref 11.5–15.5)
LYMPHOCYTES # BLD AUTO: 2.3 K/UL — SIGNIFICANT CHANGE UP (ref 1–3.3)
LYMPHOCYTES # BLD AUTO: 38.5 % — SIGNIFICANT CHANGE UP (ref 13–44)
MCHC RBC-ENTMCNC: 31.9 PG — SIGNIFICANT CHANGE UP (ref 27–34)
MCHC RBC-ENTMCNC: 33.5 G/DL — SIGNIFICANT CHANGE UP (ref 32–36)
MCV RBC AUTO: 95.2 FL — SIGNIFICANT CHANGE UP (ref 80–100)
MONOCYTES # BLD AUTO: 0.3 K/UL — SIGNIFICANT CHANGE UP (ref 0–0.9)
MONOCYTES NFR BLD AUTO: 4.9 % — SIGNIFICANT CHANGE UP (ref 2–14)
NEUTROPHILS # BLD AUTO: 3.2 K/UL — SIGNIFICANT CHANGE UP (ref 1.8–7.4)
NEUTROPHILS NFR BLD AUTO: 54.7 % — SIGNIFICANT CHANGE UP (ref 43–77)
PLATELET # BLD AUTO: 297 K/UL — SIGNIFICANT CHANGE UP (ref 150–400)
RBC # BLD: 3.27 M/UL — LOW (ref 3.8–5.2)
RBC # FLD: 13.5 % — SIGNIFICANT CHANGE UP (ref 10.3–14.5)
WBC # BLD: 5.9 K/UL — SIGNIFICANT CHANGE UP (ref 3.8–10.5)
WBC # FLD AUTO: 5.9 K/UL — SIGNIFICANT CHANGE UP (ref 3.8–10.5)

## 2024-03-25 PROCEDURE — 86900 BLOOD TYPING SEROLOGIC ABO: CPT

## 2024-03-25 PROCEDURE — 99215 OFFICE O/P EST HI 40 MIN: CPT

## 2024-03-25 PROCEDURE — 86850 RBC ANTIBODY SCREEN: CPT

## 2024-03-25 PROCEDURE — 0001U RBC DNA HEA 35 AG 11 BLD GRP: CPT

## 2024-03-25 PROCEDURE — 86901 BLOOD TYPING SEROLOGIC RH(D): CPT

## 2024-03-25 PROCEDURE — 36415 COLL VENOUS BLD VENIPUNCTURE: CPT

## 2024-03-26 LAB
ALBUMIN SERPL ELPH-MCNC: 4.3 G/DL
ALP BLD-CCNC: 55 U/L
ALT SERPL-CCNC: 22 U/L
ANION GAP SERPL CALC-SCNC: 14 MMOL/L
AST SERPL-CCNC: 23 U/L
BILIRUB SERPL-MCNC: 0.5 MG/DL
BUN SERPL-MCNC: 13 MG/DL
CALCIUM SERPL-MCNC: 9.7 MG/DL
CHLORIDE SERPL-SCNC: 98 MMOL/L
CO2 SERPL-SCNC: 24 MMOL/L
CREAT SERPL-MCNC: 1.05 MG/DL
EGFR: 61 ML/MIN/1.73M2
GLUCOSE SERPL-MCNC: 90 MG/DL
POTASSIUM SERPL-SCNC: 4.4 MMOL/L
PROT SERPL-MCNC: 9.6 G/DL
SODIUM SERPL-SCNC: 137 MMOL/L

## 2024-03-28 RX ORDER — LENALIDOMIDE 25 MG/1
25 CAPSULE ORAL DAILY
Qty: 21 | Refills: 0 | Status: ACTIVE | COMMUNITY
Start: 2024-03-28 | End: 1900-01-01

## 2024-03-29 ENCOUNTER — APPOINTMENT (OUTPATIENT)
Dept: GASTROENTEROLOGY | Facility: CLINIC | Age: 59
End: 2024-03-29
Payer: COMMERCIAL

## 2024-03-29 VITALS
BODY MASS INDEX: 26.81 KG/M2 | WEIGHT: 142 LBS | SYSTOLIC BLOOD PRESSURE: 160 MMHG | DIASTOLIC BLOOD PRESSURE: 70 MMHG | HEIGHT: 61 IN

## 2024-03-29 DIAGNOSIS — Z12.11 ENCOUNTER FOR SCREENING FOR MALIGNANT NEOPLASM OF COLON: ICD-10-CM

## 2024-03-29 DIAGNOSIS — R10.11 RIGHT UPPER QUADRANT PAIN: ICD-10-CM

## 2024-03-29 DIAGNOSIS — K21.9 GASTRO-ESOPHAGEAL REFLUX DISEASE W/OUT ESOPHAGITIS: ICD-10-CM

## 2024-03-29 DIAGNOSIS — R14.2 ERUCTATION: ICD-10-CM

## 2024-03-29 PROCEDURE — 99203 OFFICE O/P NEW LOW 30 MIN: CPT

## 2024-03-29 NOTE — REVIEW OF SYSTEMS
[As Noted in HPI] : as noted in HPI [Abdominal Pain] : abdominal pain [Negative] : Heme/Lymph [Constipation] : no constipation [Vomiting] : no vomiting [Diarrhea] : no diarrhea [Heartburn] : heartburn [Melena (black stool)] : no melena [Bleeding] : no bleeding [Fecal Incontinence (soiling)] : no fecal incontinence [Bloating (gassiness)] : bloating

## 2024-03-29 NOTE — PHYSICAL EXAM
[Alert] : alert [Normal Voice/Communication] : normal voice/communication [Healthy Appearing] : healthy appearing [Hearing Threshold Finger Rub Not Vermilion] : hearing was normal [Sclera] : the sclera and conjunctiva were normal [Normal Lips/Gums] : the lips and gums were normal [Normal Appearance] : the appearance of the neck was normal [No Respiratory Distress] : no respiratory distress [Auscultation Breath Sounds / Voice Sounds] : lungs were clear to auscultation bilaterally [Heart Rate And Rhythm] : heart rate was normal and rhythm regular [Normal S1, S2] : normal S1 and S2 [Bowel Sounds] : normal bowel sounds [Abdomen Tenderness] : non-tender [Abdomen Soft] : soft [Abnormal Walk] : normal gait [Normal Color / Pigmentation] : normal skin color and pigmentation [Oriented To Time, Place, And Person] : oriented to person, place, and time

## 2024-03-29 NOTE — ASSESSMENT
[FreeTextEntry1] : 58-year-old female presenting for colonoscopy, intermittent right upper quadrant pain, episodes of uncontrolled belching, and GERD.   Plan:  Belching: May be related to diet, will have patient try simethicone 250mg for control of symptoms. If ineffective, may be related to GERD, will consider PPI therapy. Pt to call in one week with evaluation of symptoms.  Right upper quadrant pain: Will have patient perform US to r/o biliary cause. Will call patient with results.  Colonoscopy: With patient currently undergoing treatment for multiple myeloma will proceed with cologuard. Reviewed steps of cologuard with patient. Will call patient with results. If positive will need to move forward with colonoscopy.  Pt agrees to plan, all questions answered. I spent 30 minutes on this encounter.

## 2024-04-01 ENCOUNTER — NON-APPOINTMENT (OUTPATIENT)
Age: 59
End: 2024-04-01

## 2024-04-05 ENCOUNTER — RESULT REVIEW (OUTPATIENT)
Age: 59
End: 2024-04-05

## 2024-04-05 ENCOUNTER — APPOINTMENT (OUTPATIENT)
Age: 59
End: 2024-04-05

## 2024-04-05 LAB
BASOPHILS # BLD AUTO: 0.1 K/UL — SIGNIFICANT CHANGE UP (ref 0–0.2)
BASOPHILS NFR BLD AUTO: 1.1 % — SIGNIFICANT CHANGE UP (ref 0–2)
EOSINOPHIL # BLD AUTO: 0.1 K/UL — SIGNIFICANT CHANGE UP (ref 0–0.5)
EOSINOPHIL NFR BLD AUTO: 2 % — SIGNIFICANT CHANGE UP (ref 0–6)
HCT VFR BLD CALC: 32.5 % — LOW (ref 34.5–45)
HGB BLD-MCNC: 11.2 G/DL — LOW (ref 11.5–15.5)
LYMPHOCYTES # BLD AUTO: 2.3 K/UL — SIGNIFICANT CHANGE UP (ref 1–3.3)
LYMPHOCYTES # BLD AUTO: 41 % — SIGNIFICANT CHANGE UP (ref 13–44)
MCHC RBC-ENTMCNC: 32.7 PG — SIGNIFICANT CHANGE UP (ref 27–34)
MCHC RBC-ENTMCNC: 34.3 G/DL — SIGNIFICANT CHANGE UP (ref 32–36)
MCV RBC AUTO: 95.2 FL — SIGNIFICANT CHANGE UP (ref 80–100)
MONOCYTES # BLD AUTO: 0.4 K/UL — SIGNIFICANT CHANGE UP (ref 0–0.9)
MONOCYTES NFR BLD AUTO: 6.5 % — SIGNIFICANT CHANGE UP (ref 2–14)
NEUTROPHILS # BLD AUTO: 2.8 K/UL — SIGNIFICANT CHANGE UP (ref 1.8–7.4)
NEUTROPHILS NFR BLD AUTO: 49.4 % — SIGNIFICANT CHANGE UP (ref 43–77)
PLATELET # BLD AUTO: 299 K/UL — SIGNIFICANT CHANGE UP (ref 150–400)
RBC # BLD: 3.42 M/UL — LOW (ref 3.8–5.2)
RBC # FLD: 14.1 % — SIGNIFICANT CHANGE UP (ref 10.3–14.5)
WBC # BLD: 5.7 K/UL — SIGNIFICANT CHANGE UP (ref 3.8–10.5)
WBC # FLD AUTO: 5.7 K/UL — SIGNIFICANT CHANGE UP (ref 3.8–10.5)

## 2024-04-05 RX ORDER — CHOLECALCIFEROL (VITAMIN D3) 125 MCG
1 CAPSULE ORAL
Qty: 0 | Refills: 0 | DISCHARGE

## 2024-04-06 LAB
HBV CORE AB SER-ACNC: SIGNIFICANT CHANGE UP
HBV SURFACE AB SER-ACNC: SIGNIFICANT CHANGE UP

## 2024-04-07 NOTE — HISTORY OF PRESENT ILLNESS
[de-identified] : IgA lambda multiple myeloma  VASILE SNYDER is a 58 year woman with PMH of DCIS (s/p bilateral mastectomies), who presents for Hematology evaluation of IgA lambda monoclonal gammopathy.  She recently established care with a new PMD Dr. Cathy Real. She had not followed up with anyone since 2020 due to the COVID pandemic. She only reports an occasional sciatica-like pain in the right hip. Otherwise, she has not had any symptoms.  Routine labs from an annual visit with her primary care physician on 1/19/24 revealed macrocytic anemia (Hgb 10.6,  - new from 2020) and an elevated protein gap (TProtein 9.2, albumin 4.3). No renal dysfunction or hypercalcemia. Paraprotein evaluation was notable for 2 IgA lambda bands (M-spike 1: 3.1, M-spike 2: 0.2). She had normal iron, B12, and thyroid studies. No proteinuria.  Interval History: - She established care in our practice on 2/29/24. Her labs were notable for mild anemia (Hgb 11), two IgA lambda bands (M-spike 1: 3.3, M-spike 2: 0.2), IgA 4555 with low IgG and IgM, and normal K/L ratio. She had normal renal function, calcium, and albumin. LDH and B2-microglobulin were normal. UPEP showed an IgA lambda band. - Bone marrow biopsy on 3/1/24 showed 40-60% plasma cells by  immunostaining. FISH panel showed 3 copies of FGFR3 (20%) and RB1 deletion (18.5%). She had a normal karyotype. - PET/CT on 3/15/24 did not show any lytic lesions. There was hypermetabolic uptake within the thyroid gland, for which sonography is suggested to exclude focal hypermetabolic nodules.  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 - Has 2 children, one lives in [place] and the other in [place] - Never smoked tobacco. Drinks 1-2 glasses of wine maybe twice a week. No illicit drug use. [de-identified] : She was here with her sister, desires to begin therapy.  Overall she feels well, works a full time job as a dentist.  She denies any bone pains, no weight loss, no change in her appetite.

## 2024-04-07 NOTE — ASSESSMENT
[FreeTextEntry1] : This is a 58 year woman with PMH of DCIS (s/p bilateral mastectomies), who presents for Hematology follow up of IgA lambda multiple myeloma.  IgA lambda multiple myeloma:  - Diagnosis:  Routine labs from an annual visit with her primary care physician on 1/19/24 revealed macrocytic anemia (Hgb 10.6,  - new from 2020) and an elevated protein gap (TProtein 9.2, albumin 4.3). No renal dysfunction or hypercalcemia. Paraprotein evaluation was notable for two IgA lambda bands (M-spike 1: 3.3, M-spike 2: 0.2), IgA 4555 with low IgG and IgM, and normal K/L ratio. She had normal renal function, calcium, and albumin. LDH and B2-microglobulin were normal. UPEP showed an IgA lambda band.   Bone marrow biopsy on 3/1/24 showed 40-60% plasma cells by  immunostaining. FISH panel showed 3 copies of FGFR3 (20%) and RB1 deletion (18.5%). She had a normal karyotype.   PET/CT on 3/15/24 did not show any lytic lesions.  - Staging: R-ISS I (low-risk) - Treatment plan: Will plan to treat with Jolene-VRd. Daratumumab SQ weekly x 8 (C1-2), then biweekly x 8 (C3-6), then monthly (C7+) Velcade SQ 1.3 mg/m2 D1, 8, 15 Revlimid PO 25 mg D1-21/28 - Monitor MM labs (SPEP, LOREN, free light chains, immunoglobulins) monthly - Monitor CBC and CMP with treatment - dental clearance prior to xgeva/zometa - acyclovir/aspiirin prophylaxis.   Transplant evaluation when she achieves a VGPR/CR.   All questions/concerns answered.  Consent for treatment obtained.   She will follow up monthly.

## 2024-04-08 DIAGNOSIS — Z51.11 ENCOUNTER FOR ANTINEOPLASTIC CHEMOTHERAPY: ICD-10-CM

## 2024-04-08 DIAGNOSIS — R11.2 NAUSEA WITH VOMITING, UNSPECIFIED: ICD-10-CM

## 2024-04-09 LAB
ALBUMIN MFR SERPL ELPH: 41.4 %
ALBUMIN SERPL-MCNC: 4 G/DL
ALBUMIN/GLOB SERPL: 0.7 RATIO
ALPHA1 GLOB MFR SERPL ELPH: 2.3 %
ALPHA1 GLOB SERPL ELPH-MCNC: 0.2 G/DL
ALPHA2 GLOB MFR SERPL ELPH: 6 %
ALPHA2 GLOB SERPL ELPH-MCNC: 0.6 G/DL
B-GLOBULIN MFR SERPL ELPH: 45.2 %
B-GLOBULIN SERPL ELPH-MCNC: 4.3 G/DL
DEPRECATED KAPPA LC FREE/LAMBDA SER: 1.24 RATIO
GAMMA GLOB FLD ELPH-MCNC: 0.5 G/DL
GAMMA GLOB MFR SERPL ELPH: 5.1 %
HAV IGM SER QL: NONREACTIVE
HBV CORE IGM SER QL: NONREACTIVE
HBV SURFACE AG SER QL: NONREACTIVE
HCV AB SER QL: NONREACTIVE
HCV S/CO RATIO: 0.03 S/CO
IGA SER QL IEP: 4329 MG/DL
IGG SER QL IEP: 376 MG/DL
IGM SER QL IEP: 14 MG/DL
INTERPRETATION SERPL IEP-IMP: NORMAL
KAPPA LC CSF-MCNC: 0.41 MG/DL
KAPPA LC SERPL-MCNC: 0.51 MG/DL
M PROTEIN MFR SERPL ELPH: NORMAL
M PROTEIN SPEC IFE-MCNC: NORMAL
MONOCLON BAND OBS SERPL: NORMAL
PROT SERPL-MCNC: 9.6 G/DL
PROT SERPL-MCNC: 9.6 G/DL

## 2024-04-10 LAB — HUMAN ERYTHROCYTE ANTIGEN PANEL RESULT: SIGNIFICANT CHANGE UP

## 2024-04-12 ENCOUNTER — APPOINTMENT (OUTPATIENT)
Age: 59
End: 2024-04-12

## 2024-04-12 ENCOUNTER — RESULT REVIEW (OUTPATIENT)
Age: 59
End: 2024-04-12

## 2024-04-12 LAB
BASOPHILS # BLD AUTO: 0.1 K/UL — SIGNIFICANT CHANGE UP (ref 0–0.2)
BASOPHILS NFR BLD AUTO: 1.3 % — SIGNIFICANT CHANGE UP (ref 0–2)
EOSINOPHIL # BLD AUTO: 0.3 K/UL — SIGNIFICANT CHANGE UP (ref 0–0.5)
EOSINOPHIL NFR BLD AUTO: 5.5 % — SIGNIFICANT CHANGE UP (ref 0–6)
HCT VFR BLD CALC: 30.9 % — LOW (ref 34.5–45)
HGB BLD-MCNC: 10.4 G/DL — LOW (ref 11.5–15.5)
LYMPHOCYTES # BLD AUTO: 0.8 K/UL — LOW (ref 1–3.3)
LYMPHOCYTES # BLD AUTO: 16.2 % — SIGNIFICANT CHANGE UP (ref 13–44)
MCHC RBC-ENTMCNC: 32.4 PG — SIGNIFICANT CHANGE UP (ref 27–34)
MCHC RBC-ENTMCNC: 33.8 G/DL — SIGNIFICANT CHANGE UP (ref 32–36)
MCV RBC AUTO: 96.1 FL — SIGNIFICANT CHANGE UP (ref 80–100)
MONOCYTES # BLD AUTO: 0.2 K/UL — SIGNIFICANT CHANGE UP (ref 0–0.9)
MONOCYTES NFR BLD AUTO: 5.2 % — SIGNIFICANT CHANGE UP (ref 2–14)
NEUTROPHILS # BLD AUTO: 3.4 K/UL — SIGNIFICANT CHANGE UP (ref 1.8–7.4)
NEUTROPHILS NFR BLD AUTO: 71.8 % — SIGNIFICANT CHANGE UP (ref 43–77)
PLATELET # BLD AUTO: 254 K/UL — SIGNIFICANT CHANGE UP (ref 150–400)
RBC # BLD: 3.21 M/UL — LOW (ref 3.8–5.2)
RBC # FLD: 13.4 % — SIGNIFICANT CHANGE UP (ref 10.3–14.5)
WBC # BLD: 4.7 K/UL — SIGNIFICANT CHANGE UP (ref 3.8–10.5)
WBC # FLD AUTO: 4.7 K/UL — SIGNIFICANT CHANGE UP (ref 3.8–10.5)

## 2024-04-19 ENCOUNTER — RESULT REVIEW (OUTPATIENT)
Age: 59
End: 2024-04-19

## 2024-04-19 ENCOUNTER — APPOINTMENT (OUTPATIENT)
Age: 59
End: 2024-04-19

## 2024-04-19 LAB
BASOPHILS # BLD AUTO: 0.1 K/UL — SIGNIFICANT CHANGE UP (ref 0–0.2)
BASOPHILS NFR BLD AUTO: 1.9 % — SIGNIFICANT CHANGE UP (ref 0–2)
EOSINOPHIL # BLD AUTO: 0.2 K/UL — SIGNIFICANT CHANGE UP (ref 0–0.5)
EOSINOPHIL NFR BLD AUTO: 4.9 % — SIGNIFICANT CHANGE UP (ref 0–6)
HCT VFR BLD CALC: 35.8 % — SIGNIFICANT CHANGE UP (ref 34.5–45)
HGB BLD-MCNC: 11.7 G/DL — SIGNIFICANT CHANGE UP (ref 11.5–15.5)
LYMPHOCYTES # BLD AUTO: 0.7 K/UL — LOW (ref 1–3.3)
LYMPHOCYTES # BLD AUTO: 13.2 % — SIGNIFICANT CHANGE UP (ref 13–44)
MCHC RBC-ENTMCNC: 32.3 PG — SIGNIFICANT CHANGE UP (ref 27–34)
MCHC RBC-ENTMCNC: 32.6 G/DL — SIGNIFICANT CHANGE UP (ref 32–36)
MCV RBC AUTO: 99.1 FL — SIGNIFICANT CHANGE UP (ref 80–100)
MONOCYTES # BLD AUTO: 0.4 K/UL — SIGNIFICANT CHANGE UP (ref 0–0.9)
MONOCYTES NFR BLD AUTO: 8.5 % — SIGNIFICANT CHANGE UP (ref 2–14)
NEUTROPHILS # BLD AUTO: 3.6 K/UL — SIGNIFICANT CHANGE UP (ref 1.8–7.4)
NEUTROPHILS NFR BLD AUTO: 71.5 % — SIGNIFICANT CHANGE UP (ref 43–77)
PLATELET # BLD AUTO: 320 K/UL — SIGNIFICANT CHANGE UP (ref 150–400)
RBC # BLD: 3.62 M/UL — LOW (ref 3.8–5.2)
RBC # FLD: 14.5 % — SIGNIFICANT CHANGE UP (ref 10.3–14.5)
WBC # BLD: 5.1 K/UL — SIGNIFICANT CHANGE UP (ref 3.8–10.5)
WBC # FLD AUTO: 5.1 K/UL — SIGNIFICANT CHANGE UP (ref 3.8–10.5)

## 2024-04-20 LAB
ALBUMIN SERPL ELPH-MCNC: 4.3 G/DL — SIGNIFICANT CHANGE UP (ref 3.3–5)
ALP SERPL-CCNC: 61 U/L — SIGNIFICANT CHANGE UP (ref 40–120)
ALT FLD-CCNC: 82 U/L — HIGH (ref 10–45)
ANION GAP SERPL CALC-SCNC: 20 MMOL/L — HIGH (ref 5–17)
AST SERPL-CCNC: 19 U/L — SIGNIFICANT CHANGE UP (ref 10–40)
BILIRUB SERPL-MCNC: 0.7 MG/DL — SIGNIFICANT CHANGE UP (ref 0.2–1.2)
BUN SERPL-MCNC: 13 MG/DL — SIGNIFICANT CHANGE UP (ref 7–23)
CALCIUM SERPL-MCNC: 9.3 MG/DL — SIGNIFICANT CHANGE UP (ref 8.4–10.5)
CHLORIDE SERPL-SCNC: 99 MMOL/L — SIGNIFICANT CHANGE UP (ref 96–108)
CO2 SERPL-SCNC: 19 MMOL/L — LOW (ref 22–31)
CREAT SERPL-MCNC: 0.92 MG/DL — SIGNIFICANT CHANGE UP (ref 0.5–1.3)
EGFR: 71 ML/MIN/1.73M2 — SIGNIFICANT CHANGE UP
GLUCOSE SERPL-MCNC: 106 MG/DL — HIGH (ref 70–99)
POTASSIUM SERPL-MCNC: 4.3 MMOL/L — SIGNIFICANT CHANGE UP (ref 3.5–5.3)
POTASSIUM SERPL-SCNC: 4.3 MMOL/L — SIGNIFICANT CHANGE UP (ref 3.5–5.3)
PROT SERPL-MCNC: 7.8 G/DL — SIGNIFICANT CHANGE UP (ref 6–8.3)
SODIUM SERPL-SCNC: 138 MMOL/L — SIGNIFICANT CHANGE UP (ref 135–145)

## 2024-04-21 LAB
ALBUMIN SERPL ELPH-MCNC: 4.2 G/DL
ALP BLD-CCNC: 67 U/L
ALT SERPL-CCNC: 61 U/L
ANION GAP SERPL CALC-SCNC: 13 MMOL/L
AST SERPL-CCNC: 35 U/L
BILIRUB SERPL-MCNC: 0.6 MG/DL
BUN SERPL-MCNC: 11 MG/DL
CALCIUM SERPL-MCNC: 9.5 MG/DL
CHLORIDE SERPL-SCNC: 98 MMOL/L
CO2 SERPL-SCNC: 27 MMOL/L
CREAT SERPL-MCNC: 1.1 MG/DL
EGFR: 58 ML/MIN/1.73M2
GLUCOSE SERPL-MCNC: 113 MG/DL
POTASSIUM SERPL-SCNC: 4.5 MMOL/L
PROT SERPL-MCNC: 8.6 G/DL
SODIUM SERPL-SCNC: 138 MMOL/L

## 2024-04-26 ENCOUNTER — RESULT REVIEW (OUTPATIENT)
Age: 59
End: 2024-04-26

## 2024-04-26 ENCOUNTER — APPOINTMENT (OUTPATIENT)
Age: 59
End: 2024-04-26

## 2024-04-26 LAB
BASOPHILS # BLD AUTO: 0.2 K/UL — SIGNIFICANT CHANGE UP (ref 0–0.2)
BASOPHILS NFR BLD AUTO: 3.3 % — HIGH (ref 0–2)
EOSINOPHIL # BLD AUTO: 0.3 K/UL — SIGNIFICANT CHANGE UP (ref 0–0.5)
EOSINOPHIL NFR BLD AUTO: 5.4 % — SIGNIFICANT CHANGE UP (ref 0–6)
HCT VFR BLD CALC: 32 % — LOW (ref 34.5–45)
HGB BLD-MCNC: 10.8 G/DL — LOW (ref 11.5–15.5)
LYMPHOCYTES # BLD AUTO: 0.9 K/UL — LOW (ref 1–3.3)
LYMPHOCYTES # BLD AUTO: 19.2 % — SIGNIFICANT CHANGE UP (ref 13–44)
MCHC RBC-ENTMCNC: 32.3 PG — SIGNIFICANT CHANGE UP (ref 27–34)
MCHC RBC-ENTMCNC: 33.7 G/DL — SIGNIFICANT CHANGE UP (ref 32–36)
MCV RBC AUTO: 95.8 FL — SIGNIFICANT CHANGE UP (ref 80–100)
MONOCYTES # BLD AUTO: 0.5 K/UL — SIGNIFICANT CHANGE UP (ref 0–0.9)
MONOCYTES NFR BLD AUTO: 9.5 % — SIGNIFICANT CHANGE UP (ref 2–14)
NEUTROPHILS # BLD AUTO: 3 K/UL — SIGNIFICANT CHANGE UP (ref 1.8–7.4)
NEUTROPHILS NFR BLD AUTO: 62.6 % — SIGNIFICANT CHANGE UP (ref 43–77)
PLATELET # BLD AUTO: 274 K/UL — SIGNIFICANT CHANGE UP (ref 150–400)
RBC # BLD: 3.34 M/UL — LOW (ref 3.8–5.2)
RBC # FLD: 14 % — SIGNIFICANT CHANGE UP (ref 10.3–14.5)
WBC # BLD: 4.8 K/UL — SIGNIFICANT CHANGE UP (ref 3.8–10.5)
WBC # FLD AUTO: 4.8 K/UL — SIGNIFICANT CHANGE UP (ref 3.8–10.5)

## 2024-04-29 ENCOUNTER — RESULT REVIEW (OUTPATIENT)
Age: 59
End: 2024-04-29

## 2024-04-29 ENCOUNTER — APPOINTMENT (OUTPATIENT)
Dept: HEMATOLOGY ONCOLOGY | Facility: CLINIC | Age: 59
End: 2024-04-29
Payer: COMMERCIAL

## 2024-04-29 VITALS
DIASTOLIC BLOOD PRESSURE: 79 MMHG | HEART RATE: 82 BPM | SYSTOLIC BLOOD PRESSURE: 126 MMHG | WEIGHT: 141.31 LBS | BODY MASS INDEX: 26.68 KG/M2 | OXYGEN SATURATION: 98 % | HEIGHT: 61 IN | TEMPERATURE: 98 F

## 2024-04-29 DIAGNOSIS — C90.00 MULTIPLE MYELOMA NOT HAVING ACHIEVED REMISSION: ICD-10-CM

## 2024-04-29 DIAGNOSIS — E04.1 NONTOXIC SINGLE THYROID NODULE: ICD-10-CM

## 2024-04-29 LAB
BASOPHILS # BLD AUTO: 0.1 K/UL — SIGNIFICANT CHANGE UP (ref 0–0.2)
BASOPHILS NFR BLD AUTO: 1.7 % — SIGNIFICANT CHANGE UP (ref 0–2)
EOSINOPHIL # BLD AUTO: 0 K/UL — SIGNIFICANT CHANGE UP (ref 0–0.5)
EOSINOPHIL NFR BLD AUTO: 0.6 % — SIGNIFICANT CHANGE UP (ref 0–6)
HCT VFR BLD CALC: 31.6 % — LOW (ref 34.5–45)
HGB BLD-MCNC: 10.7 G/DL — LOW (ref 11.5–15.5)
LYMPHOCYTES # BLD AUTO: 3.2 K/UL — SIGNIFICANT CHANGE UP (ref 1–3.3)
LYMPHOCYTES # BLD AUTO: 42.9 % — SIGNIFICANT CHANGE UP (ref 13–44)
MCHC RBC-ENTMCNC: 32.8 PG — SIGNIFICANT CHANGE UP (ref 27–34)
MCHC RBC-ENTMCNC: 33.8 G/DL — SIGNIFICANT CHANGE UP (ref 32–36)
MCV RBC AUTO: 96.8 FL — SIGNIFICANT CHANGE UP (ref 80–100)
MONOCYTES # BLD AUTO: 0.7 K/UL — SIGNIFICANT CHANGE UP (ref 0–0.9)
MONOCYTES NFR BLD AUTO: 9.3 % — SIGNIFICANT CHANGE UP (ref 2–14)
NEUTROPHILS # BLD AUTO: 3.4 K/UL — SIGNIFICANT CHANGE UP (ref 1.8–7.4)
NEUTROPHILS NFR BLD AUTO: 45.6 % — SIGNIFICANT CHANGE UP (ref 43–77)
PLATELET # BLD AUTO: 316 K/UL — SIGNIFICANT CHANGE UP (ref 150–400)
RBC # BLD: 3.27 M/UL — LOW (ref 3.8–5.2)
RBC # FLD: 14.1 % — SIGNIFICANT CHANGE UP (ref 10.3–14.5)
WBC # BLD: 7.5 K/UL — SIGNIFICANT CHANGE UP (ref 3.8–10.5)
WBC # FLD AUTO: 7.5 K/UL — SIGNIFICANT CHANGE UP (ref 3.8–10.5)

## 2024-04-29 PROCEDURE — 99215 OFFICE O/P EST HI 40 MIN: CPT

## 2024-04-29 RX ORDER — ACYCLOVIR 400 MG/1
400 TABLET ORAL TWICE DAILY
Qty: 60 | Refills: 4 | Status: ACTIVE | COMMUNITY
Start: 2024-04-29 | End: 1900-01-01

## 2024-04-29 RX ORDER — METOCLOPRAMIDE 10 MG/1
10 TABLET ORAL EVERY 6 HOURS
Qty: 30 | Refills: 1 | Status: ACTIVE | COMMUNITY
Start: 2024-04-29 | End: 1900-01-01

## 2024-04-29 NOTE — ASSESSMENT
[FreeTextEntry1] : This is a 58 year woman with PMH of DCIS (s/p bilateral mastectomies) with IgA lambda multiple myeloma.  IgA lambda multiple myeloma:  - Diagnosis:  Routine labs from an annual visit with her primary care physician on 1/19/24 revealed macrocytic anemia (Hgb 10.6,  - new from 2020) and an elevated protein gap (TProtein 9.2, albumin 4.3). No renal dysfunction or hypercalcemia. Paraprotein evaluation was notable for two IgA lambda bands (M-spike 1: 3.3, M-spike 2: 0.2), IgA 4555 with low IgG and IgM, and normal K/L ratio. She had normal renal function, calcium, and albumin. LDH and B2-microglobulin were normal. UPEP showed an IgA lambda band.   Bone marrow biopsy on 3/1/24 showed 40-60% plasma cells by  immunostaining. FISH panel showed 3 copies of FGFR3 (20%) and RB1 deletion (18.5%). She had a normal karyotype.   PET/CT on 3/15/24 did not show any lytic lesions.  - Staging: R-ISS I (low-risk) - Treatment plan: Will plan to treat with Jolene-VRd. Daratumumab SQ weekly x 8 (C1-2), then biweekly x 8 (C3-6), then monthly (C7+) Velcade SQ 1.3 mg/m2 D1, 8, 15 Revlimid PO 25 mg D1-21/28 - Monitor MM labs (SPEP, LOREN, free light chains, immunoglobulins) monthly - Monitor CBC and CMP with treatment - dental clearance prior to xgeva/zometa - acyclovir/aspiirin prophylaxis.   She just completed cycle 1 which she tolerated well.   Repeat her immunoelectrophoresis today.  Schedule her next cycle of treatment.  Continue aspirin/acyclovir prophylaxis.  Reglan for nausea as zofran caused constipation.  D/c dex 4mg on Sunday for now, no side effects.  Thyroid ultrasound to evaluate nodule.  Endocriology referral.   Transplant evaluation when she achieves a VGPR/CR.   All questions/concerns answered.     She will follow up monthly.  Has a follow up with Dr. Can on 6/26.

## 2024-04-29 NOTE — HISTORY OF PRESENT ILLNESS
[de-identified] : IgA lambda multiple myeloma  VASILE SNYDER is a 58 year woman with PMH of DCIS (s/p bilateral mastectomies), who presents for Hematology evaluation of IgA lambda monoclonal gammopathy.  She recently established care with a new PMD Dr. Cathy Real. She had not followed up with anyone since 2020 due to the COVID pandemic. She only reports an occasional sciatica-like pain in the right hip. Otherwise, she has not had any symptoms.  Routine labs from an annual visit with her primary care physician on 1/19/24 revealed macrocytic anemia (Hgb 10.6,  - new from 2020) and an elevated protein gap (TProtein 9.2, albumin 4.3). No renal dysfunction or hypercalcemia. Paraprotein evaluation was notable for 2 IgA lambda bands (M-spike 1: 3.1, M-spike 2: 0.2). She had normal iron, B12, and thyroid studies. No proteinuria.  Interval History: - She established care in our practice on 2/29/24. Her labs were notable for mild anemia (Hgb 11), two IgA lambda bands (M-spike 1: 3.3, M-spike 2: 0.2), IgA 4555 with low IgG and IgM, and normal K/L ratio. She had normal renal function, calcium, and albumin. LDH and B2-microglobulin were normal. UPEP showed an IgA lambda band. - Bone marrow biopsy on 3/1/24 showed 40-60% plasma cells by  immunostaining. FISH panel showed 3 copies of FGFR3 (20%) and RB1 deletion (18.5%). She had a normal karyotype. - PET/CT on 3/15/24 did not show any lytic lesions. There was hypermetabolic uptake within the thyroid gland, for which sonography is suggested to exclude focal hypermetabolic nodules.  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 - Has 2 children, one lives in [place] and the other in [place] - Never smoked tobacco. Drinks 1-2 glasses of wine maybe twice a week. No illicit drug use. [de-identified] : She was here with her sister for a follow up.  She just completed cycle one of D-RVD.  She had some constipation with her first cycle of the treatment but otherwise had no complaints.  Felt that the zofran caused the constipation.  Her appetite is good, no weight loss.  No bone pains.  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

## 2024-05-01 LAB
ALBUMIN MFR SERPL ELPH: 55.9 %
ALBUMIN SERPL-MCNC: 4 G/DL
ALBUMIN/GLOB SERPL: 1.2 RATIO
ALPHA1 GLOB MFR SERPL ELPH: 3.6 %
ALPHA1 GLOB SERPL ELPH-MCNC: 0.3 G/DL
ALPHA2 GLOB MFR SERPL ELPH: 8.7 %
ALPHA2 GLOB SERPL ELPH-MCNC: 0.6 G/DL
B-GLOBULIN MFR SERPL ELPH: 25.7 %
B-GLOBULIN SERPL ELPH-MCNC: 1.9 G/DL
DEPRECATED KAPPA LC FREE/LAMBDA SER: 1.55 RATIO
GAMMA GLOB FLD ELPH-MCNC: 0.4 G/DL
GAMMA GLOB MFR SERPL ELPH: 6.1 %
IGA SER QL IEP: 1447 MG/DL
IGG SER QL IEP: 422 MG/DL
IGM SER QL IEP: 11 MG/DL
INTERPRETATION SERPL IEP-IMP: NORMAL
KAPPA LC CSF-MCNC: 0.38 MG/DL
KAPPA LC SERPL-MCNC: 0.59 MG/DL
M PROTEIN MFR SERPL ELPH: NORMAL
M PROTEIN SPEC IFE-MCNC: NORMAL
MONOCLON BAND OBS SERPL: NORMAL
PROT SERPL-MCNC: 7.2 G/DL
PROT SERPL-MCNC: 7.2 G/DL

## 2024-05-03 ENCOUNTER — RESULT REVIEW (OUTPATIENT)
Age: 59
End: 2024-05-03

## 2024-05-03 ENCOUNTER — APPOINTMENT (OUTPATIENT)
Age: 59
End: 2024-05-03

## 2024-05-03 LAB
BASOPHILS # BLD AUTO: 0.2 K/UL — SIGNIFICANT CHANGE UP (ref 0–0.2)
BASOPHILS NFR BLD AUTO: 2.5 % — HIGH (ref 0–2)
EOSINOPHIL # BLD AUTO: 0.1 K/UL — SIGNIFICANT CHANGE UP (ref 0–0.5)
EOSINOPHIL NFR BLD AUTO: 1.1 % — SIGNIFICANT CHANGE UP (ref 0–6)
HCT VFR BLD CALC: 32.6 % — LOW (ref 34.5–45)
HGB BLD-MCNC: 11 G/DL — LOW (ref 11.5–15.5)
LYMPHOCYTES # BLD AUTO: 1.4 K/UL — SIGNIFICANT CHANGE UP (ref 1–3.3)
LYMPHOCYTES # BLD AUTO: 20.4 % — SIGNIFICANT CHANGE UP (ref 13–44)
MCHC RBC-ENTMCNC: 33.1 PG — SIGNIFICANT CHANGE UP (ref 27–34)
MCHC RBC-ENTMCNC: 33.9 G/DL — SIGNIFICANT CHANGE UP (ref 32–36)
MCV RBC AUTO: 97.6 FL — SIGNIFICANT CHANGE UP (ref 80–100)
MONOCYTES # BLD AUTO: 0.6 K/UL — SIGNIFICANT CHANGE UP (ref 0–0.9)
MONOCYTES NFR BLD AUTO: 8.8 % — SIGNIFICANT CHANGE UP (ref 2–14)
NEUTROPHILS # BLD AUTO: 4.5 K/UL — SIGNIFICANT CHANGE UP (ref 1.8–7.4)
NEUTROPHILS NFR BLD AUTO: 67.2 % — SIGNIFICANT CHANGE UP (ref 43–77)
PLATELET # BLD AUTO: 391 K/UL — SIGNIFICANT CHANGE UP (ref 150–400)
RBC # BLD: 3.34 M/UL — LOW (ref 3.8–5.2)
RBC # FLD: 14 % — SIGNIFICANT CHANGE UP (ref 10.3–14.5)
WBC # BLD: 6.8 K/UL — SIGNIFICANT CHANGE UP (ref 3.8–10.5)
WBC # FLD AUTO: 6.8 K/UL — SIGNIFICANT CHANGE UP (ref 3.8–10.5)

## 2024-05-10 ENCOUNTER — OUTPATIENT (OUTPATIENT)
Dept: OUTPATIENT SERVICES | Facility: HOSPITAL | Age: 59
LOS: 1 days | End: 2024-05-10

## 2024-05-10 ENCOUNTER — APPOINTMENT (OUTPATIENT)
Dept: ULTRASOUND IMAGING | Facility: CLINIC | Age: 59
End: 2024-05-10
Payer: COMMERCIAL

## 2024-05-10 ENCOUNTER — APPOINTMENT (OUTPATIENT)
Age: 59
End: 2024-05-10

## 2024-05-10 ENCOUNTER — RESULT REVIEW (OUTPATIENT)
Age: 59
End: 2024-05-10

## 2024-05-10 DIAGNOSIS — Z98.891 HISTORY OF UTERINE SCAR FROM PREVIOUS SURGERY: Chronic | ICD-10-CM

## 2024-05-10 DIAGNOSIS — E04.1 NONTOXIC SINGLE THYROID NODULE: ICD-10-CM

## 2024-05-10 DIAGNOSIS — Z90.49 ACQUIRED ABSENCE OF OTHER SPECIFIED PARTS OF DIGESTIVE TRACT: Chronic | ICD-10-CM

## 2024-05-10 LAB
BASOPHILS # BLD AUTO: 0.2 K/UL — SIGNIFICANT CHANGE UP (ref 0–0.2)
BASOPHILS NFR BLD AUTO: 2.7 % — HIGH (ref 0–2)
EOSINOPHIL # BLD AUTO: 0.3 K/UL — SIGNIFICANT CHANGE UP (ref 0–0.5)
EOSINOPHIL NFR BLD AUTO: 4.4 % — SIGNIFICANT CHANGE UP (ref 0–6)
HCT VFR BLD CALC: 36.2 % — SIGNIFICANT CHANGE UP (ref 34.5–45)
HGB BLD-MCNC: 11.6 G/DL — SIGNIFICANT CHANGE UP (ref 11.5–15.5)
LYMPHOCYTES # BLD AUTO: 0.9 K/UL — LOW (ref 1–3.3)
LYMPHOCYTES # BLD AUTO: 13.4 % — SIGNIFICANT CHANGE UP (ref 13–44)
MCHC RBC-ENTMCNC: 31.2 PG — SIGNIFICANT CHANGE UP (ref 27–34)
MCHC RBC-ENTMCNC: 31.9 G/DL — LOW (ref 32–36)
MCV RBC AUTO: 97.8 FL — SIGNIFICANT CHANGE UP (ref 80–100)
MONOCYTES # BLD AUTO: 0.3 K/UL — SIGNIFICANT CHANGE UP (ref 0–0.9)
MONOCYTES NFR BLD AUTO: 5.2 % — SIGNIFICANT CHANGE UP (ref 2–14)
NEUTROPHILS # BLD AUTO: 5 K/UL — SIGNIFICANT CHANGE UP (ref 1.8–7.4)
NEUTROPHILS NFR BLD AUTO: 74.3 % — SIGNIFICANT CHANGE UP (ref 43–77)
PLATELET # BLD AUTO: 316 K/UL — SIGNIFICANT CHANGE UP (ref 150–400)
RBC # BLD: 3.7 M/UL — LOW (ref 3.8–5.2)
RBC # FLD: 14.1 % — SIGNIFICANT CHANGE UP (ref 10.3–14.5)
WBC # BLD: 6.7 K/UL — SIGNIFICANT CHANGE UP (ref 3.8–10.5)
WBC # FLD AUTO: 6.7 K/UL — SIGNIFICANT CHANGE UP (ref 3.8–10.5)

## 2024-05-10 PROCEDURE — 76536 US EXAM OF HEAD AND NECK: CPT | Mod: 26

## 2024-05-17 ENCOUNTER — APPOINTMENT (OUTPATIENT)
Age: 59
End: 2024-05-17

## 2024-05-17 ENCOUNTER — RESULT REVIEW (OUTPATIENT)
Age: 59
End: 2024-05-17

## 2024-05-17 LAB
BASOPHILS # BLD AUTO: 0.2 K/UL — SIGNIFICANT CHANGE UP (ref 0–0.2)
BASOPHILS NFR BLD AUTO: 3.2 % — HIGH (ref 0–2)
EOSINOPHIL # BLD AUTO: 0.5 K/UL — SIGNIFICANT CHANGE UP (ref 0–0.5)
EOSINOPHIL NFR BLD AUTO: 6.7 % — HIGH (ref 0–6)
HCT VFR BLD CALC: 34.3 % — LOW (ref 34.5–45)
HGB BLD-MCNC: 11.4 G/DL — LOW (ref 11.5–15.5)
LYMPHOCYTES # BLD AUTO: 0.9 K/UL — LOW (ref 1–3.3)
LYMPHOCYTES # BLD AUTO: 12.4 % — LOW (ref 13–44)
MCHC RBC-ENTMCNC: 32 PG — SIGNIFICANT CHANGE UP (ref 27–34)
MCHC RBC-ENTMCNC: 33 G/DL — SIGNIFICANT CHANGE UP (ref 32–36)
MCV RBC AUTO: 97 FL — SIGNIFICANT CHANGE UP (ref 80–100)
MONOCYTES # BLD AUTO: 0.7 K/UL — SIGNIFICANT CHANGE UP (ref 0–0.9)
MONOCYTES NFR BLD AUTO: 10.3 % — SIGNIFICANT CHANGE UP (ref 2–14)
NEUTROPHILS # BLD AUTO: 4.7 K/UL — SIGNIFICANT CHANGE UP (ref 1.8–7.4)
NEUTROPHILS NFR BLD AUTO: 67.5 % — SIGNIFICANT CHANGE UP (ref 43–77)
PLATELET # BLD AUTO: 275 K/UL — SIGNIFICANT CHANGE UP (ref 150–400)
RBC # BLD: 3.54 M/UL — LOW (ref 3.8–5.2)
RBC # FLD: 13.9 % — SIGNIFICANT CHANGE UP (ref 10.3–14.5)
WBC # BLD: 6.9 K/UL — SIGNIFICANT CHANGE UP (ref 3.8–10.5)
WBC # FLD AUTO: 6.9 K/UL — SIGNIFICANT CHANGE UP (ref 3.8–10.5)

## 2024-05-20 ENCOUNTER — OUTPATIENT (OUTPATIENT)
Dept: OUTPATIENT SERVICES | Facility: HOSPITAL | Age: 59
LOS: 1 days | Discharge: HOME | End: 2024-05-20

## 2024-05-20 DIAGNOSIS — Z90.49 ACQUIRED ABSENCE OF OTHER SPECIFIED PARTS OF DIGESTIVE TRACT: Chronic | ICD-10-CM

## 2024-05-20 DIAGNOSIS — C90.00 MULTIPLE MYELOMA NOT HAVING ACHIEVED REMISSION: ICD-10-CM

## 2024-05-24 ENCOUNTER — APPOINTMENT (OUTPATIENT)
Age: 59
End: 2024-05-24

## 2024-05-24 ENCOUNTER — RESULT REVIEW (OUTPATIENT)
Age: 59
End: 2024-05-24

## 2024-05-24 LAB
BASOPHILS # BLD AUTO: 0.2 K/UL — SIGNIFICANT CHANGE UP (ref 0–0.2)
BASOPHILS NFR BLD AUTO: 3.6 % — HIGH (ref 0–2)
EOSINOPHIL # BLD AUTO: 0.6 K/UL — HIGH (ref 0–0.5)
EOSINOPHIL NFR BLD AUTO: 10.9 % — HIGH (ref 0–6)
HCT VFR BLD CALC: 34.1 % — LOW (ref 34.5–45)
HGB BLD-MCNC: 11.5 G/DL — SIGNIFICANT CHANGE UP (ref 11.5–15.5)
LYMPHOCYTES # BLD AUTO: 1.4 K/UL — SIGNIFICANT CHANGE UP (ref 1–3.3)
LYMPHOCYTES # BLD AUTO: 23.9 % — SIGNIFICANT CHANGE UP (ref 13–44)
MCHC RBC-ENTMCNC: 32.7 PG — SIGNIFICANT CHANGE UP (ref 27–34)
MCHC RBC-ENTMCNC: 33.7 G/DL — SIGNIFICANT CHANGE UP (ref 32–36)
MCV RBC AUTO: 97.1 FL — SIGNIFICANT CHANGE UP (ref 80–100)
MONOCYTES # BLD AUTO: 0.5 K/UL — SIGNIFICANT CHANGE UP (ref 0–0.9)
MONOCYTES NFR BLD AUTO: 8.2 % — SIGNIFICANT CHANGE UP (ref 2–14)
NEUTROPHILS # BLD AUTO: 3.2 K/UL — SIGNIFICANT CHANGE UP (ref 1.8–7.4)
NEUTROPHILS NFR BLD AUTO: 53.5 % — SIGNIFICANT CHANGE UP (ref 43–77)
PLATELET # BLD AUTO: 262 K/UL — SIGNIFICANT CHANGE UP (ref 150–400)
RBC # BLD: 3.51 M/UL — LOW (ref 3.8–5.2)
RBC # FLD: 13.5 % — SIGNIFICANT CHANGE UP (ref 10.3–14.5)
WBC # BLD: 6 K/UL — SIGNIFICANT CHANGE UP (ref 3.8–10.5)
WBC # FLD AUTO: 6 K/UL — SIGNIFICANT CHANGE UP (ref 3.8–10.5)

## 2024-05-28 DIAGNOSIS — Z51.11 ENCOUNTER FOR ANTINEOPLASTIC CHEMOTHERAPY: ICD-10-CM

## 2024-05-28 DIAGNOSIS — R11.2 NAUSEA WITH VOMITING, UNSPECIFIED: ICD-10-CM

## 2024-05-29 ENCOUNTER — RESULT REVIEW (OUTPATIENT)
Age: 59
End: 2024-05-29

## 2024-05-29 ENCOUNTER — APPOINTMENT (OUTPATIENT)
Dept: HEMATOLOGY ONCOLOGY | Facility: CLINIC | Age: 59
End: 2024-05-29
Payer: COMMERCIAL

## 2024-05-29 VITALS
OXYGEN SATURATION: 98 % | BODY MASS INDEX: 25.89 KG/M2 | DIASTOLIC BLOOD PRESSURE: 77 MMHG | WEIGHT: 137.13 LBS | TEMPERATURE: 97.4 F | HEART RATE: 87 BPM | HEIGHT: 61 IN | SYSTOLIC BLOOD PRESSURE: 157 MMHG

## 2024-05-29 LAB
BASOPHILS # BLD AUTO: 0.1 K/UL — SIGNIFICANT CHANGE UP (ref 0–0.2)
BASOPHILS NFR BLD AUTO: 1.6 % — SIGNIFICANT CHANGE UP (ref 0–2)
EOSINOPHIL # BLD AUTO: 0.1 K/UL — SIGNIFICANT CHANGE UP (ref 0–0.5)
EOSINOPHIL NFR BLD AUTO: 1.5 % — SIGNIFICANT CHANGE UP (ref 0–6)
HCT VFR BLD CALC: 35.3 % — SIGNIFICANT CHANGE UP (ref 34.5–45)
HGB BLD-MCNC: 11.9 G/DL — SIGNIFICANT CHANGE UP (ref 11.5–15.5)
LYMPHOCYTES # BLD AUTO: 2.2 K/UL — SIGNIFICANT CHANGE UP (ref 1–3.3)
LYMPHOCYTES # BLD AUTO: 30.8 % — SIGNIFICANT CHANGE UP (ref 13–44)
MCHC RBC-ENTMCNC: 32.8 PG — SIGNIFICANT CHANGE UP (ref 27–34)
MCHC RBC-ENTMCNC: 33.8 G/DL — SIGNIFICANT CHANGE UP (ref 32–36)
MCV RBC AUTO: 97.1 FL — SIGNIFICANT CHANGE UP (ref 80–100)
MONOCYTES # BLD AUTO: 0.5 K/UL — SIGNIFICANT CHANGE UP (ref 0–0.9)
MONOCYTES NFR BLD AUTO: 6.2 % — SIGNIFICANT CHANGE UP (ref 2–14)
NEUTROPHILS # BLD AUTO: 4.4 K/UL — SIGNIFICANT CHANGE UP (ref 1.8–7.4)
NEUTROPHILS NFR BLD AUTO: 59.9 % — SIGNIFICANT CHANGE UP (ref 43–77)
PLATELET # BLD AUTO: 442 K/UL — HIGH (ref 150–400)
RBC # BLD: 3.64 M/UL — LOW (ref 3.8–5.2)
RBC # FLD: 14.2 % — SIGNIFICANT CHANGE UP (ref 10.3–14.5)
WBC # BLD: 7.3 K/UL — SIGNIFICANT CHANGE UP (ref 3.8–10.5)
WBC # FLD AUTO: 7.3 K/UL — SIGNIFICANT CHANGE UP (ref 3.8–10.5)

## 2024-05-29 PROCEDURE — 99215 OFFICE O/P EST HI 40 MIN: CPT

## 2024-05-29 NOTE — ASSESSMENT
[FreeTextEntry1] : This is a 58 year woman with PMH of DCIS (s/p bilateral mastectomies) with IgA lambda multiple myeloma.  IgA lambda multiple myeloma:  - Diagnosis:  Routine labs from an annual visit with her primary care physician on 1/19/24 revealed macrocytic anemia (Hgb 10.6,  - new from 2020) and an elevated protein gap (TProtein 9.2, albumin 4.3). No renal dysfunction or hypercalcemia. Paraprotein evaluation was notable for two IgA lambda bands (M-spike 1: 3.3, M-spike 2: 0.2), IgA 4555 with low IgG and IgM, and normal K/L ratio. She had normal renal function, calcium, and albumin. LDH and B2-microglobulin were normal. UPEP showed an IgA lambda band.   Bone marrow biopsy on 3/1/24 showed 40-60% plasma cells by  immunostaining. FISH panel showed 3 copies of FGFR3 (20%) and RB1 deletion (18.5%). She had a normal karyotype.   PET/CT on 3/15/24 did not show any lytic lesions.   Staging: R-ISS I (low-risk) Treatment plan: Will plan to treat with Jolene-VRd.  She just completed cycle 2 which she tolerated well.   Labs-  3/25/24- M spike 3.4, IgA 4329, k/l FLC ratio 1.24 4/29/24- M spike 1.2, IgA 1447, k/l FLC ratio 1.55  Repeat her immunoelectrophoresis today.  Schedule her next cycle of treatment.  Continue aspirin/acyclovir prophylaxis.  Reglan for nausea as zofran caused constipation.  D/c dex 4mg on Sunday for now, no side effects.  Thyroid ultrasound to evaluate nodule- planned for a biopsy 6/13.  Endocriology referral.   Transplant evaluation when she achieves a VGPR/CR.   All questions/concerns answered.     She will follow up monthly.  Has a follow up with Dr. Can on 6/26.

## 2024-05-29 NOTE — HISTORY OF PRESENT ILLNESS
Dr Galicia,     Please review results and reply to me or patient.     Thanks!    Component      Latest Ref Rng & Units 5/2/2019   TSH      0.350 - 5.000 mcUnits/mL 9.860 (H)   PARATHYROID HORMONE      19 - 88 pg/mL 83   CALCIUM      8.4 - 10.2 mg/dL 9.5   Albumin      3.6 - 5.1 g/dL 3.9   Thyroglobulin Antibody      0.0 - 4.0 IU/mL <0.9   Thyroglobulin      1.2 - 35.0 ng/mL 16.7        [de-identified] : IgA lambda multiple myeloma  VASILE SNYDER is a 58 year woman with PMH of DCIS (s/p bilateral mastectomies), who presents for Hematology evaluation of IgA lambda monoclonal gammopathy.  She recently established care with a new PMD Dr. Cathy Real. She had not followed up with anyone since 2020 due to the COVID pandemic. She only reports an occasional sciatica-like pain in the right hip. Otherwise, she has not had any symptoms.  Routine labs from an annual visit with her primary care physician on 1/19/24 revealed macrocytic anemia (Hgb 10.6,  - new from 2020) and an elevated protein gap (TProtein 9.2, albumin 4.3). No renal dysfunction or hypercalcemia. Paraprotein evaluation was notable for 2 IgA lambda bands (M-spike 1: 3.1, M-spike 2: 0.2). She had normal iron, B12, and thyroid studies. No proteinuria.  Interval History: - She established care in our practice on 2/29/24. Her labs were notable for mild anemia (Hgb 11), two IgA lambda bands (M-spike 1: 3.3, M-spike 2: 0.2), IgA 4555 with low IgG and IgM, and normal K/L ratio. She had normal renal function, calcium, and albumin. LDH and B2-microglobulin were normal. UPEP showed an IgA lambda band. - Bone marrow biopsy on 3/1/24 showed 40-60% plasma cells by  immunostaining. FISH panel showed 3 copies of FGFR3 (20%) and RB1 deletion (18.5%). She had a normal karyotype. - PET/CT on 3/15/24 did not show any lytic lesions. There was hypermetabolic uptake within the thyroid gland, for which sonography is suggested to exclude focal hypermetabolic nodules.  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 - Has 2 children, one lives in [place] and the other in [place] - Never smoked tobacco. Drinks 1-2 glasses of wine maybe twice a week. No illicit drug use. [de-identified] : She was here with her sister for a follow up.  She just completed cycle 2 of D-RVD.  She is feeling well, no complaints.  No n/v/d/c, tolerating diet.  Her appetite is good, no weight loss.  No bone pains.  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

## 2024-05-31 ENCOUNTER — APPOINTMENT (OUTPATIENT)
Age: 59
End: 2024-05-31

## 2024-06-03 LAB
ALBUMIN MFR SERPL ELPH: 62.9 %
ALBUMIN SERPL ELPH-MCNC: 4.6 G/DL
ALBUMIN SERPL-MCNC: 3.9 G/DL
ALBUMIN/GLOB SERPL: 1.7 RATIO
ALP BLD-CCNC: 65 U/L
ALPHA1 GLOB MFR SERPL ELPH: 4.6 %
ALPHA1 GLOB SERPL ELPH-MCNC: 0.3 G/DL
ALPHA2 GLOB MFR SERPL ELPH: 10.4 %
ALPHA2 GLOB SERPL ELPH-MCNC: 0.6 G/DL
ALT SERPL-CCNC: 53 U/L
ANION GAP SERPL CALC-SCNC: 12 MMOL/L
AST SERPL-CCNC: 20 U/L
B-GLOBULIN MFR SERPL ELPH: 14.9 %
B-GLOBULIN SERPL ELPH-MCNC: 0.9 G/DL
BILIRUB SERPL-MCNC: 0.9 MG/DL
BUN SERPL-MCNC: 18 MG/DL
CALCIUM SERPL-MCNC: 9 MG/DL
CHLORIDE SERPL-SCNC: 100 MMOL/L
CO2 SERPL-SCNC: 25 MMOL/L
CREAT SERPL-MCNC: 0.96 MG/DL
DEPRECATED KAPPA LC FREE/LAMBDA SER: 1.68 RATIO
EGFR: 68 ML/MIN/1.73M2
GAMMA GLOB FLD ELPH-MCNC: 0.4 G/DL
GAMMA GLOB MFR SERPL ELPH: 7.2 %
GLUCOSE SERPL-MCNC: 95 MG/DL
IGA SER QL IEP: 343 MG/DL
IGG SER QL IEP: 401 MG/DL
IGM SER QL IEP: 12 MG/DL
INTERPRETATION SERPL IEP-IMP: NORMAL
KAPPA LC CSF-MCNC: 0.4 MG/DL
KAPPA LC SERPL-MCNC: 0.67 MG/DL
M PROTEIN MFR SERPL ELPH: NORMAL
M PROTEIN SPEC IFE-MCNC: NORMAL
MONOCLON BAND OBS SERPL: NORMAL
POTASSIUM SERPL-SCNC: 3.8 MMOL/L
PROT SERPL-MCNC: 6.2 G/DL
SODIUM SERPL-SCNC: 137 MMOL/L

## 2024-06-07 ENCOUNTER — APPOINTMENT (OUTPATIENT)
Age: 59
End: 2024-06-07

## 2024-06-07 ENCOUNTER — RESULT REVIEW (OUTPATIENT)
Age: 59
End: 2024-06-07

## 2024-06-07 LAB
BASOPHILS # BLD AUTO: 0.1 K/UL — SIGNIFICANT CHANGE UP (ref 0–0.2)
BASOPHILS NFR BLD AUTO: 2.4 % — HIGH (ref 0–2)
EOSINOPHIL # BLD AUTO: 0.3 K/UL — SIGNIFICANT CHANGE UP (ref 0–0.5)
EOSINOPHIL NFR BLD AUTO: 4.6 % — SIGNIFICANT CHANGE UP (ref 0–6)
HCT VFR BLD CALC: 37.4 % — SIGNIFICANT CHANGE UP (ref 34.5–45)
HGB BLD-MCNC: 12.4 G/DL — SIGNIFICANT CHANGE UP (ref 11.5–15.5)
LYMPHOCYTES # BLD AUTO: 1 K/UL — SIGNIFICANT CHANGE UP (ref 1–3.3)
LYMPHOCYTES # BLD AUTO: 16.5 % — SIGNIFICANT CHANGE UP (ref 13–44)
MCHC RBC-ENTMCNC: 32.4 PG — SIGNIFICANT CHANGE UP (ref 27–34)
MCHC RBC-ENTMCNC: 33.3 G/DL — SIGNIFICANT CHANGE UP (ref 32–36)
MCV RBC AUTO: 97.3 FL — SIGNIFICANT CHANGE UP (ref 80–100)
MONOCYTES # BLD AUTO: 0.3 K/UL — SIGNIFICANT CHANGE UP (ref 0–0.9)
MONOCYTES NFR BLD AUTO: 4.8 % — SIGNIFICANT CHANGE UP (ref 2–14)
NEUTROPHILS # BLD AUTO: 4.4 K/UL — SIGNIFICANT CHANGE UP (ref 1.8–7.4)
NEUTROPHILS NFR BLD AUTO: 71.7 % — SIGNIFICANT CHANGE UP (ref 43–77)
PLATELET # BLD AUTO: 377 K/UL — SIGNIFICANT CHANGE UP (ref 150–400)
RBC # BLD: 3.84 M/UL — SIGNIFICANT CHANGE UP (ref 3.8–5.2)
RBC # FLD: 13.5 % — SIGNIFICANT CHANGE UP (ref 10.3–14.5)
WBC # BLD: 6.1 K/UL — SIGNIFICANT CHANGE UP (ref 3.8–10.5)
WBC # FLD AUTO: 6.1 K/UL — SIGNIFICANT CHANGE UP (ref 3.8–10.5)

## 2024-06-12 ENCOUNTER — RESULT REVIEW (OUTPATIENT)
Age: 59
End: 2024-06-12

## 2024-06-13 ENCOUNTER — RESULT REVIEW (OUTPATIENT)
Age: 59
End: 2024-06-13

## 2024-06-13 ENCOUNTER — OUTPATIENT (OUTPATIENT)
Dept: OUTPATIENT SERVICES | Facility: HOSPITAL | Age: 59
LOS: 1 days | End: 2024-06-13

## 2024-06-13 ENCOUNTER — APPOINTMENT (OUTPATIENT)
Dept: ULTRASOUND IMAGING | Facility: CLINIC | Age: 59
End: 2024-06-13
Payer: COMMERCIAL

## 2024-06-13 DIAGNOSIS — E04.1 NONTOXIC SINGLE THYROID NODULE: ICD-10-CM

## 2024-06-13 DIAGNOSIS — Z98.891 HISTORY OF UTERINE SCAR FROM PREVIOUS SURGERY: Chronic | ICD-10-CM

## 2024-06-13 DIAGNOSIS — Z90.49 ACQUIRED ABSENCE OF OTHER SPECIFIED PARTS OF DIGESTIVE TRACT: Chronic | ICD-10-CM

## 2024-06-13 PROCEDURE — 10005 FNA BX W/US GDN 1ST LES: CPT

## 2024-06-14 ENCOUNTER — APPOINTMENT (OUTPATIENT)
Age: 59
End: 2024-06-14

## 2024-06-14 ENCOUNTER — RESULT REVIEW (OUTPATIENT)
Age: 59
End: 2024-06-14

## 2024-06-14 LAB
BASOPHILS # BLD AUTO: 0.2 K/UL — SIGNIFICANT CHANGE UP (ref 0–0.2)
BASOPHILS NFR BLD AUTO: 2.6 % — HIGH (ref 0–2)
EOSINOPHIL # BLD AUTO: 0.4 K/UL — SIGNIFICANT CHANGE UP (ref 0–0.5)
EOSINOPHIL NFR BLD AUTO: 4.5 % — SIGNIFICANT CHANGE UP (ref 0–6)
HCT VFR BLD CALC: 36.2 % — SIGNIFICANT CHANGE UP (ref 34.5–45)
HGB BLD-MCNC: 12 G/DL — SIGNIFICANT CHANGE UP (ref 11.5–15.5)
LYMPHOCYTES # BLD AUTO: 2 K/UL — SIGNIFICANT CHANGE UP (ref 1–3.3)
LYMPHOCYTES # BLD AUTO: 21.8 % — SIGNIFICANT CHANGE UP (ref 13–44)
MCHC RBC-ENTMCNC: 31.9 PG — SIGNIFICANT CHANGE UP (ref 27–34)
MCHC RBC-ENTMCNC: 33 G/DL — SIGNIFICANT CHANGE UP (ref 32–36)
MCV RBC AUTO: 96.7 FL — SIGNIFICANT CHANGE UP (ref 80–100)
MONOCYTES # BLD AUTO: 0.7 K/UL — SIGNIFICANT CHANGE UP (ref 0–0.9)
MONOCYTES NFR BLD AUTO: 7.5 % — SIGNIFICANT CHANGE UP (ref 2–14)
NEUTROPHILS # BLD AUTO: 5.9 K/UL — SIGNIFICANT CHANGE UP (ref 1.8–7.4)
NEUTROPHILS NFR BLD AUTO: 63.5 % — SIGNIFICANT CHANGE UP (ref 43–77)
PLATELET # BLD AUTO: 277 K/UL — SIGNIFICANT CHANGE UP (ref 150–400)
RBC # BLD: 3.75 M/UL — LOW (ref 3.8–5.2)
RBC # FLD: 13.4 % — SIGNIFICANT CHANGE UP (ref 10.3–14.5)
WBC # BLD: 9.3 K/UL — SIGNIFICANT CHANGE UP (ref 3.8–10.5)
WBC # FLD AUTO: 9.3 K/UL — SIGNIFICANT CHANGE UP (ref 3.8–10.5)

## 2024-06-14 PROCEDURE — 88173 CYTOPATH EVAL FNA REPORT: CPT | Mod: 26

## 2024-06-16 LAB — NON-GYNECOLOGICAL CYTOLOGY STUDY: SIGNIFICANT CHANGE UP

## 2024-06-19 RX ORDER — LENALIDOMIDE 25 MG/1
25 CAPSULE ORAL DAILY
Qty: 21 | Refills: 0 | Status: ACTIVE | COMMUNITY
Start: 2024-03-28 | End: 1900-01-01

## 2024-06-21 ENCOUNTER — RESULT REVIEW (OUTPATIENT)
Age: 59
End: 2024-06-21

## 2024-06-21 ENCOUNTER — APPOINTMENT (OUTPATIENT)
Age: 59
End: 2024-06-21

## 2024-06-21 LAB
ANISOCYTOSIS BLD QL: SLIGHT — SIGNIFICANT CHANGE UP
BASOPHILS # BLD AUTO: 0.2 K/UL — SIGNIFICANT CHANGE UP (ref 0–0.2)
BASOPHILS NFR BLD AUTO: 2 % — SIGNIFICANT CHANGE UP (ref 0–2)
ELLIPTOCYTES BLD QL SMEAR: SLIGHT — SIGNIFICANT CHANGE UP
EOSINOPHIL # BLD AUTO: 0.4 K/UL — SIGNIFICANT CHANGE UP (ref 0–0.5)
EOSINOPHIL NFR BLD AUTO: 5 % — SIGNIFICANT CHANGE UP (ref 0–6)
GIANT PLATELETS BLD QL SMEAR: PRESENT — SIGNIFICANT CHANGE UP
HCT VFR BLD CALC: 35.5 % — SIGNIFICANT CHANGE UP (ref 34.5–45)
HGB BLD-MCNC: 11.9 G/DL — SIGNIFICANT CHANGE UP (ref 11.5–15.5)
LG PLATELETS BLD QL AUTO: SLIGHT — SIGNIFICANT CHANGE UP
LYMPHOCYTES # BLD AUTO: 1.4 K/UL — SIGNIFICANT CHANGE UP (ref 1–3.3)
LYMPHOCYTES # BLD AUTO: 29 % — SIGNIFICANT CHANGE UP (ref 13–44)
MACROCYTES BLD QL: SIGNIFICANT CHANGE UP
MCHC RBC-ENTMCNC: 32.3 PG — SIGNIFICANT CHANGE UP (ref 27–34)
MCHC RBC-ENTMCNC: 33.6 G/DL — SIGNIFICANT CHANGE UP (ref 32–36)
MCV RBC AUTO: 96.3 FL — SIGNIFICANT CHANGE UP (ref 80–100)
MICROCYTES BLD QL: SLIGHT — SIGNIFICANT CHANGE UP
MONOCYTES # BLD AUTO: 0.5 K/UL — SIGNIFICANT CHANGE UP (ref 0–0.9)
MONOCYTES NFR BLD AUTO: 10 % — SIGNIFICANT CHANGE UP (ref 2–14)
NEUTROPHILS # BLD AUTO: 3.4 K/UL — SIGNIFICANT CHANGE UP (ref 1.8–7.4)
NEUTROPHILS NFR BLD AUTO: 53 % — SIGNIFICANT CHANGE UP (ref 43–77)
NEUTS BAND # BLD: 1 % — SIGNIFICANT CHANGE UP (ref 0–8)
OVALOCYTES BLD QL SMEAR: SLIGHT — SIGNIFICANT CHANGE UP
PLAT MORPH BLD: NORMAL — SIGNIFICANT CHANGE UP
PLATELET # BLD AUTO: 236 K/UL — SIGNIFICANT CHANGE UP (ref 150–400)
POIKILOCYTOSIS BLD QL AUTO: SLIGHT — SIGNIFICANT CHANGE UP
RBC # BLD: 3.69 M/UL — LOW (ref 3.8–5.2)
RBC # FLD: 13.6 % — SIGNIFICANT CHANGE UP (ref 10.3–14.5)
RBC BLD AUTO: SIGNIFICANT CHANGE UP
WBC # BLD: 5.9 K/UL — SIGNIFICANT CHANGE UP (ref 3.8–10.5)
WBC # FLD AUTO: 5.9 K/UL — SIGNIFICANT CHANGE UP (ref 3.8–10.5)

## 2024-06-25 ENCOUNTER — OUTPATIENT (OUTPATIENT)
Dept: OUTPATIENT SERVICES | Facility: HOSPITAL | Age: 59
LOS: 1 days | Discharge: HOME | End: 2024-06-25

## 2024-06-25 DIAGNOSIS — D64.9 ANEMIA, UNSPECIFIED: ICD-10-CM

## 2024-06-25 DIAGNOSIS — D47.2 MONOCLONAL GAMMOPATHY: ICD-10-CM

## 2024-06-25 DIAGNOSIS — Z90.49 ACQUIRED ABSENCE OF OTHER SPECIFIED PARTS OF DIGESTIVE TRACT: Chronic | ICD-10-CM

## 2024-06-25 DIAGNOSIS — Z98.891 HISTORY OF UTERINE SCAR FROM PREVIOUS SURGERY: Chronic | ICD-10-CM

## 2024-06-25 LAB
BLOCK/SPECIMEN ID: SIGNIFICANT CHANGE UP
BRAF GENE MUT ANL BLD/T: SIGNIFICANT CHANGE UP
HRAS GENE MUT ANL BLD/T: SIGNIFICANT CHANGE UP
KRAS GENE MUT ANL BLD/T: SIGNIFICANT CHANGE UP
NRAS GENE MUT ANL BLD/T: SIGNIFICANT CHANGE UP
PAX8/PPARG: SIGNIFICANT CHANGE UP
RET/PTC1: SIGNIFICANT CHANGE UP
RET/PTC3: SIGNIFICANT CHANGE UP
SPECIMEN SOURCE: SIGNIFICANT CHANGE UP

## 2024-06-26 ENCOUNTER — APPOINTMENT (OUTPATIENT)
Dept: HEMATOLOGY ONCOLOGY | Facility: CLINIC | Age: 59
End: 2024-06-26
Payer: COMMERCIAL

## 2024-06-26 ENCOUNTER — RESULT REVIEW (OUTPATIENT)
Age: 59
End: 2024-06-26

## 2024-06-26 VITALS
WEIGHT: 137 LBS | HEIGHT: 61 IN | RESPIRATION RATE: 17 BRPM | BODY MASS INDEX: 25.86 KG/M2 | SYSTOLIC BLOOD PRESSURE: 130 MMHG | OXYGEN SATURATION: 97 % | DIASTOLIC BLOOD PRESSURE: 74 MMHG | HEART RATE: 91 BPM

## 2024-06-26 DIAGNOSIS — C90.00 MULTIPLE MYELOMA NOT HAVING ACHIEVED REMISSION: ICD-10-CM

## 2024-06-26 LAB
BASOPHILS # BLD AUTO: 0.1 K/UL — SIGNIFICANT CHANGE UP (ref 0–0.2)
BASOPHILS NFR BLD AUTO: 1.4 % — SIGNIFICANT CHANGE UP (ref 0–2)
EOSINOPHIL # BLD AUTO: 0.1 K/UL — SIGNIFICANT CHANGE UP (ref 0–0.5)
EOSINOPHIL NFR BLD AUTO: 1 % — SIGNIFICANT CHANGE UP (ref 0–6)
HCT VFR BLD CALC: 35.6 % — SIGNIFICANT CHANGE UP (ref 34.5–45)
HGB BLD-MCNC: 12.2 G/DL — SIGNIFICANT CHANGE UP (ref 11.5–15.5)
LYMPHOCYTES # BLD AUTO: 1.8 K/UL — SIGNIFICANT CHANGE UP (ref 1–3.3)
LYMPHOCYTES # BLD AUTO: 29.4 % — SIGNIFICANT CHANGE UP (ref 13–44)
MCHC RBC-ENTMCNC: 32.8 PG — SIGNIFICANT CHANGE UP (ref 27–34)
MCHC RBC-ENTMCNC: 34.4 G/DL — SIGNIFICANT CHANGE UP (ref 32–36)
MCV RBC AUTO: 95.4 FL — SIGNIFICANT CHANGE UP (ref 80–100)
MONOCYTES # BLD AUTO: 0.5 K/UL — SIGNIFICANT CHANGE UP (ref 0–0.9)
MONOCYTES NFR BLD AUTO: 7.4 % — SIGNIFICANT CHANGE UP (ref 2–14)
NEUTROPHILS # BLD AUTO: 3.8 K/UL — SIGNIFICANT CHANGE UP (ref 1.8–7.4)
NEUTROPHILS NFR BLD AUTO: 60.9 % — SIGNIFICANT CHANGE UP (ref 43–77)
PLATELET # BLD AUTO: 378 K/UL — SIGNIFICANT CHANGE UP (ref 150–400)
RBC # BLD: 3.73 M/UL — LOW (ref 3.8–5.2)
RBC # FLD: 13.4 % — SIGNIFICANT CHANGE UP (ref 10.3–14.5)
WBC # BLD: 6.2 K/UL — SIGNIFICANT CHANGE UP (ref 3.8–10.5)
WBC # FLD AUTO: 6.2 K/UL — SIGNIFICANT CHANGE UP (ref 3.8–10.5)

## 2024-06-26 PROCEDURE — 99214 OFFICE O/P EST MOD 30 MIN: CPT

## 2024-06-26 PROCEDURE — 99213 OFFICE O/P EST LOW 20 MIN: CPT

## 2024-06-28 ENCOUNTER — APPOINTMENT (OUTPATIENT)
Age: 59
End: 2024-06-28

## 2024-07-02 LAB
ALBUMIN MFR SERPL ELPH: 67.3 %
ALBUMIN SERPL ELPH-MCNC: 4.4 G/DL
ALBUMIN SERPL-MCNC: 4.2 G/DL
ALBUMIN/GLOB SERPL: 2.1 RATIO
ALP BLD-CCNC: 54 U/L
ALPHA1 GLOB MFR SERPL ELPH: 4.7 %
ALPHA1 GLOB SERPL ELPH-MCNC: 0.3 G/DL
ALPHA2 GLOB MFR SERPL ELPH: 10.2 %
ALPHA2 GLOB SERPL ELPH-MCNC: 0.6 G/DL
ALT SERPL-CCNC: 23 U/L
ANION GAP SERPL CALC-SCNC: 12 MMOL/L
AST SERPL-CCNC: 15 U/L
B-GLOBULIN MFR SERPL ELPH: 10.7 %
B-GLOBULIN SERPL ELPH-MCNC: 0.7 G/DL
BILIRUB SERPL-MCNC: 1 MG/DL
BUN SERPL-MCNC: 16 MG/DL
CALCIUM SERPL-MCNC: 9.3 MG/DL
CHLORIDE SERPL-SCNC: 101 MMOL/L
CO2 SERPL-SCNC: 27 MMOL/L
CREAT SERPL-MCNC: 1.12 MG/DL
DEPRECATED KAPPA LC FREE/LAMBDA SER: 1.89 RATIO
EGFR: 56 ML/MIN/1.73M2
GAMMA GLOB FLD ELPH-MCNC: 0.4 G/DL
GAMMA GLOB MFR SERPL ELPH: 7.1 %
GLUCOSE SERPL-MCNC: 115 MG/DL
IGA SER QL IEP: 107 MG/DL
IGG SER QL IEP: 454 MG/DL
IGM SER QL IEP: 15 MG/DL
INTERPRETATION SERPL IEP-IMP: NORMAL
KAPPA LC CSF-MCNC: 0.38 MG/DL
KAPPA LC SERPL-MCNC: 0.72 MG/DL
M PROTEIN MFR SERPL ELPH: NORMAL
M PROTEIN SPEC IFE-MCNC: NORMAL
MONOCLON BAND OBS SERPL: NORMAL
POTASSIUM SERPL-SCNC: 4.1 MMOL/L
PROT SERPL-MCNC: 6.2 G/DL
SODIUM SERPL-SCNC: 140 MMOL/L

## 2024-07-05 ENCOUNTER — RESULT REVIEW (OUTPATIENT)
Age: 59
End: 2024-07-05

## 2024-07-05 ENCOUNTER — NON-APPOINTMENT (OUTPATIENT)
Age: 59
End: 2024-07-05

## 2024-07-05 ENCOUNTER — APPOINTMENT (OUTPATIENT)
Age: 59
End: 2024-07-05

## 2024-07-05 LAB
BASOPHILS # BLD AUTO: 0.2 K/UL — SIGNIFICANT CHANGE UP (ref 0–0.2)
BASOPHILS NFR BLD AUTO: 3 % — HIGH (ref 0–2)
EOSINOPHIL # BLD AUTO: 0.3 K/UL — SIGNIFICANT CHANGE UP (ref 0–0.5)
EOSINOPHIL NFR BLD AUTO: 6.4 % — HIGH (ref 0–6)
HCT VFR BLD CALC: 39.5 % — SIGNIFICANT CHANGE UP (ref 34.5–45)
HGB BLD-MCNC: 12.7 G/DL — SIGNIFICANT CHANGE UP (ref 11.5–15.5)
LYMPHOCYTES # BLD AUTO: 1.1 K/UL — SIGNIFICANT CHANGE UP (ref 1–3.3)
LYMPHOCYTES # BLD AUTO: 21.1 % — SIGNIFICANT CHANGE UP (ref 13–44)
MCHC RBC-ENTMCNC: 32.1 G/DL — SIGNIFICANT CHANGE UP (ref 32–36)
MCHC RBC-ENTMCNC: 32.1 PG — SIGNIFICANT CHANGE UP (ref 27–34)
MCV RBC AUTO: 100.2 FL — HIGH (ref 80–100)
MONOCYTES # BLD AUTO: 0.3 K/UL — SIGNIFICANT CHANGE UP (ref 0–0.9)
MONOCYTES NFR BLD AUTO: 5.5 % — SIGNIFICANT CHANGE UP (ref 2–14)
NEUTROPHILS # BLD AUTO: 3.4 K/UL — SIGNIFICANT CHANGE UP (ref 1.8–7.4)
NEUTROPHILS NFR BLD AUTO: 64 % — SIGNIFICANT CHANGE UP (ref 43–77)
PLATELET # BLD AUTO: 288 K/UL — SIGNIFICANT CHANGE UP (ref 150–400)
RBC # BLD: 3.94 M/UL — SIGNIFICANT CHANGE UP (ref 3.8–5.2)
RBC # FLD: 13.8 % — SIGNIFICANT CHANGE UP (ref 10.3–14.5)
WBC # BLD: 5.3 K/UL — SIGNIFICANT CHANGE UP (ref 3.8–10.5)
WBC # FLD AUTO: 5.3 K/UL — SIGNIFICANT CHANGE UP (ref 3.8–10.5)

## 2024-07-12 ENCOUNTER — APPOINTMENT (OUTPATIENT)
Age: 59
End: 2024-07-12

## 2024-07-12 ENCOUNTER — RESULT REVIEW (OUTPATIENT)
Age: 59
End: 2024-07-12

## 2024-07-12 LAB
BASOPHILS # BLD AUTO: 0.2 K/UL — SIGNIFICANT CHANGE UP (ref 0–0.2)
BASOPHILS NFR BLD AUTO: 2.6 % — HIGH (ref 0–2)
EOSINOPHIL # BLD AUTO: 0.3 K/UL — SIGNIFICANT CHANGE UP (ref 0–0.5)
EOSINOPHIL NFR BLD AUTO: 4.3 % — SIGNIFICANT CHANGE UP (ref 0–6)
HCT VFR BLD CALC: 39.4 % — SIGNIFICANT CHANGE UP (ref 34.5–45)
HGB BLD-MCNC: 13.1 G/DL — SIGNIFICANT CHANGE UP (ref 11.5–15.5)
LYMPHOCYTES # BLD AUTO: 1.3 K/UL — SIGNIFICANT CHANGE UP (ref 1–3.3)
LYMPHOCYTES # BLD AUTO: 17 % — SIGNIFICANT CHANGE UP (ref 13–44)
MCHC RBC-ENTMCNC: 32.4 PG — SIGNIFICANT CHANGE UP (ref 27–34)
MCHC RBC-ENTMCNC: 33.2 G/DL — SIGNIFICANT CHANGE UP (ref 32–36)
MCV RBC AUTO: 97.7 FL — SIGNIFICANT CHANGE UP (ref 80–100)
MONOCYTES # BLD AUTO: 0.7 K/UL — SIGNIFICANT CHANGE UP (ref 0–0.9)
MONOCYTES NFR BLD AUTO: 9.5 % — SIGNIFICANT CHANGE UP (ref 2–14)
NEUTROPHILS # BLD AUTO: 5.1 K/UL — SIGNIFICANT CHANGE UP (ref 1.8–7.4)
NEUTROPHILS NFR BLD AUTO: 66.7 % — SIGNIFICANT CHANGE UP (ref 43–77)
PLATELET # BLD AUTO: 266 K/UL — SIGNIFICANT CHANGE UP (ref 150–400)
RBC # BLD: 4.03 M/UL — SIGNIFICANT CHANGE UP (ref 3.8–5.2)
RBC # FLD: 13.7 % — SIGNIFICANT CHANGE UP (ref 10.3–14.5)
WBC # BLD: 7.6 K/UL — SIGNIFICANT CHANGE UP (ref 3.8–10.5)
WBC # FLD AUTO: 7.6 K/UL — SIGNIFICANT CHANGE UP (ref 3.8–10.5)

## 2024-07-16 ENCOUNTER — NON-APPOINTMENT (OUTPATIENT)
Age: 59
End: 2024-07-16

## 2024-07-17 ENCOUNTER — APPOINTMENT (OUTPATIENT)
Dept: HEMATOLOGY ONCOLOGY | Facility: CLINIC | Age: 59
End: 2024-07-17
Payer: COMMERCIAL

## 2024-07-17 ENCOUNTER — NON-APPOINTMENT (OUTPATIENT)
Age: 59
End: 2024-07-17

## 2024-07-17 VITALS
HEART RATE: 84 BPM | OXYGEN SATURATION: 100 % | DIASTOLIC BLOOD PRESSURE: 84 MMHG | HEIGHT: 62.4 IN | WEIGHT: 132.98 LBS | RESPIRATION RATE: 17 BRPM | TEMPERATURE: 98.6 F | BODY MASS INDEX: 24.16 KG/M2 | SYSTOLIC BLOOD PRESSURE: 154 MMHG

## 2024-07-17 DIAGNOSIS — Z01.818 ENCOUNTER FOR OTHER PREPROCEDURAL EXAMINATION: ICD-10-CM

## 2024-07-17 PROCEDURE — 99215 OFFICE O/P EST HI 40 MIN: CPT

## 2024-07-17 PROCEDURE — G2211 COMPLEX E/M VISIT ADD ON: CPT | Mod: NC,1L

## 2024-07-19 ENCOUNTER — RESULT REVIEW (OUTPATIENT)
Age: 59
End: 2024-07-19

## 2024-07-19 ENCOUNTER — APPOINTMENT (OUTPATIENT)
Age: 59
End: 2024-07-19

## 2024-07-19 ENCOUNTER — OUTPATIENT (OUTPATIENT)
Dept: OUTPATIENT SERVICES | Facility: HOSPITAL | Age: 59
LOS: 1 days | Discharge: HOME | End: 2024-07-19

## 2024-07-19 ENCOUNTER — NON-APPOINTMENT (OUTPATIENT)
Age: 59
End: 2024-07-19

## 2024-07-19 DIAGNOSIS — C90.00 MULTIPLE MYELOMA NOT HAVING ACHIEVED REMISSION: ICD-10-CM

## 2024-07-19 DIAGNOSIS — Z98.891 HISTORY OF UTERINE SCAR FROM PREVIOUS SURGERY: Chronic | ICD-10-CM

## 2024-07-19 DIAGNOSIS — Z90.49 ACQUIRED ABSENCE OF OTHER SPECIFIED PARTS OF DIGESTIVE TRACT: Chronic | ICD-10-CM

## 2024-07-19 LAB
BASOPHILS # BLD AUTO: 0.2 K/UL — SIGNIFICANT CHANGE UP (ref 0–0.2)
BASOPHILS NFR BLD AUTO: 3 % — HIGH (ref 0–2)
EOSINOPHIL # BLD AUTO: 0.6 K/UL — HIGH (ref 0–0.5)
EOSINOPHIL NFR BLD AUTO: 8.9 % — HIGH (ref 0–6)
HCT VFR BLD CALC: 37.3 % — SIGNIFICANT CHANGE UP (ref 34.5–45)
HGB BLD-MCNC: 12.4 G/DL — SIGNIFICANT CHANGE UP (ref 11.5–15.5)
LYMPHOCYTES # BLD AUTO: 1 K/UL — SIGNIFICANT CHANGE UP (ref 1–3.3)
LYMPHOCYTES # BLD AUTO: 16 % — SIGNIFICANT CHANGE UP (ref 13–44)
MCHC RBC-ENTMCNC: 32.4 PG — SIGNIFICANT CHANGE UP (ref 27–34)
MCHC RBC-ENTMCNC: 33.2 G/DL — SIGNIFICANT CHANGE UP (ref 32–36)
MCV RBC AUTO: 97.5 FL — SIGNIFICANT CHANGE UP (ref 80–100)
MONOCYTES # BLD AUTO: 0.6 K/UL — SIGNIFICANT CHANGE UP (ref 0–0.9)
MONOCYTES NFR BLD AUTO: 8.9 % — SIGNIFICANT CHANGE UP (ref 2–14)
NEUTROPHILS # BLD AUTO: 4.1 K/UL — SIGNIFICANT CHANGE UP (ref 1.8–7.4)
NEUTROPHILS NFR BLD AUTO: 63.2 % — SIGNIFICANT CHANGE UP (ref 43–77)
PLATELET # BLD AUTO: 214 K/UL — SIGNIFICANT CHANGE UP (ref 150–400)
RBC # BLD: 3.82 M/UL — SIGNIFICANT CHANGE UP (ref 3.8–5.2)
RBC # FLD: 14.5 % — SIGNIFICANT CHANGE UP (ref 10.3–14.5)
WBC # BLD: 6.5 K/UL — SIGNIFICANT CHANGE UP (ref 3.8–10.5)
WBC # FLD AUTO: 6.5 K/UL — SIGNIFICANT CHANGE UP (ref 3.8–10.5)

## 2024-07-22 DIAGNOSIS — Z51.11 ENCOUNTER FOR ANTINEOPLASTIC CHEMOTHERAPY: ICD-10-CM

## 2024-07-22 DIAGNOSIS — R11.2 NAUSEA WITH VOMITING, UNSPECIFIED: ICD-10-CM

## 2024-07-26 ENCOUNTER — APPOINTMENT (OUTPATIENT)
Age: 59
End: 2024-07-26

## 2024-07-26 ENCOUNTER — RESULT REVIEW (OUTPATIENT)
Age: 59
End: 2024-07-26

## 2024-07-26 LAB
BASOPHILS # BLD AUTO: 0.1 K/UL — SIGNIFICANT CHANGE UP (ref 0–0.2)
BASOPHILS NFR BLD AUTO: 2.1 % — HIGH (ref 0–2)
EOSINOPHIL # BLD AUTO: 0.3 K/UL — SIGNIFICANT CHANGE UP (ref 0–0.5)
EOSINOPHIL NFR BLD AUTO: 3.7 % — SIGNIFICANT CHANGE UP (ref 0–6)
HCT VFR BLD CALC: 40 % — SIGNIFICANT CHANGE UP (ref 34.5–45)
HGB BLD-MCNC: 12.9 G/DL — SIGNIFICANT CHANGE UP (ref 11.5–15.5)
LYMPHOCYTES # BLD AUTO: 1.4 K/UL — SIGNIFICANT CHANGE UP (ref 1–3.3)
LYMPHOCYTES # BLD AUTO: 18.7 % — SIGNIFICANT CHANGE UP (ref 13–44)
MCHC RBC-ENTMCNC: 31.9 PG — SIGNIFICANT CHANGE UP (ref 27–34)
MCHC RBC-ENTMCNC: 32.2 G/DL — SIGNIFICANT CHANGE UP (ref 32–36)
MCV RBC AUTO: 99 FL — SIGNIFICANT CHANGE UP (ref 80–100)
MONOCYTES # BLD AUTO: 0.6 K/UL — SIGNIFICANT CHANGE UP (ref 0–0.9)
MONOCYTES NFR BLD AUTO: 8.7 % — SIGNIFICANT CHANGE UP (ref 2–14)
NEUTROPHILS # BLD AUTO: 4.9 K/UL — SIGNIFICANT CHANGE UP (ref 1.8–7.4)
NEUTROPHILS NFR BLD AUTO: 66.9 % — SIGNIFICANT CHANGE UP (ref 43–77)
PLATELET # BLD AUTO: 386 K/UL — SIGNIFICANT CHANGE UP (ref 150–400)
RBC # BLD: 4.04 M/UL — SIGNIFICANT CHANGE UP (ref 3.8–5.2)
RBC # FLD: 14.4 % — SIGNIFICANT CHANGE UP (ref 10.3–14.5)
WBC # BLD: 7.3 K/UL — SIGNIFICANT CHANGE UP (ref 3.8–10.5)
WBC # FLD AUTO: 7.3 K/UL — SIGNIFICANT CHANGE UP (ref 3.8–10.5)

## 2024-07-29 LAB
ALBUMIN SERPL ELPH-MCNC: 4.2 G/DL
ALP BLD-CCNC: 59 U/L
ALT SERPL-CCNC: 19 U/L
ANION GAP SERPL CALC-SCNC: 11 MMOL/L
AST SERPL-CCNC: 16 U/L
BILIRUB SERPL-MCNC: 1 MG/DL
BUN SERPL-MCNC: 15 MG/DL
CALCIUM SERPL-MCNC: 9.2 MG/DL
CHLORIDE SERPL-SCNC: 104 MMOL/L
CO2 SERPL-SCNC: 26 MMOL/L
CREAT SERPL-MCNC: 1.09 MG/DL
EGFR: 58 ML/MIN/1.73M2
GLUCOSE SERPL-MCNC: 78 MG/DL
POTASSIUM SERPL-SCNC: 4.7 MMOL/L
PROT SERPL-MCNC: 5.9 G/DL
SODIUM SERPL-SCNC: 141 MMOL/L

## 2024-07-31 ENCOUNTER — LABORATORY RESULT (OUTPATIENT)
Age: 59
End: 2024-07-31

## 2024-07-31 ENCOUNTER — RESULT REVIEW (OUTPATIENT)
Age: 59
End: 2024-07-31

## 2024-07-31 ENCOUNTER — APPOINTMENT (OUTPATIENT)
Dept: HEMATOLOGY ONCOLOGY | Facility: CLINIC | Age: 59
End: 2024-07-31
Payer: COMMERCIAL

## 2024-07-31 ENCOUNTER — OUTPATIENT (OUTPATIENT)
Dept: OUTPATIENT SERVICES | Facility: HOSPITAL | Age: 59
LOS: 1 days | End: 2024-07-31

## 2024-07-31 VITALS
TEMPERATURE: 96.7 F | OXYGEN SATURATION: 98 % | RESPIRATION RATE: 17 BRPM | BODY MASS INDEX: 23.64 KG/M2 | HEART RATE: 93 BPM | WEIGHT: 130.93 LBS | DIASTOLIC BLOOD PRESSURE: 80 MMHG | SYSTOLIC BLOOD PRESSURE: 145 MMHG

## 2024-07-31 DIAGNOSIS — C90.00 MULTIPLE MYELOMA NOT HAVING ACHIEVED REMISSION: ICD-10-CM

## 2024-07-31 DIAGNOSIS — Z90.49 ACQUIRED ABSENCE OF OTHER SPECIFIED PARTS OF DIGESTIVE TRACT: Chronic | ICD-10-CM

## 2024-07-31 DIAGNOSIS — Z01.818 ENCOUNTER FOR OTHER PREPROCEDURAL EXAMINATION: ICD-10-CM

## 2024-07-31 DIAGNOSIS — Z98.891 HISTORY OF UTERINE SCAR FROM PREVIOUS SURGERY: Chronic | ICD-10-CM

## 2024-07-31 LAB
BASOPHILS # BLD AUTO: 0.12 K/UL — SIGNIFICANT CHANGE UP (ref 0–0.2)
BASOPHILS NFR BLD AUTO: 2.1 % — HIGH (ref 0–2)
EOSINOPHIL # BLD AUTO: 0.25 K/UL — SIGNIFICANT CHANGE UP (ref 0–0.5)
EOSINOPHIL NFR BLD AUTO: 4.3 % — SIGNIFICANT CHANGE UP (ref 0–6)
HCT VFR BLD CALC: 35.6 % — SIGNIFICANT CHANGE UP (ref 34.5–45)
HGB BLD-MCNC: 12.1 G/DL — SIGNIFICANT CHANGE UP (ref 11.5–15.5)
IMM GRANULOCYTES NFR BLD AUTO: 0.2 % — SIGNIFICANT CHANGE UP (ref 0–0.9)
LYMPHOCYTES # BLD AUTO: 1.55 K/UL — SIGNIFICANT CHANGE UP (ref 1–3.3)
LYMPHOCYTES # BLD AUTO: 26.9 % — SIGNIFICANT CHANGE UP (ref 13–44)
MCHC RBC-ENTMCNC: 32.1 PG — SIGNIFICANT CHANGE UP (ref 27–34)
MCHC RBC-ENTMCNC: 34 G/DL — SIGNIFICANT CHANGE UP (ref 32–36)
MCV RBC AUTO: 94.4 FL — SIGNIFICANT CHANGE UP (ref 80–100)
MONOCYTES # BLD AUTO: 0.36 K/UL — SIGNIFICANT CHANGE UP (ref 0–0.9)
MONOCYTES NFR BLD AUTO: 6.3 % — SIGNIFICANT CHANGE UP (ref 2–14)
NEUTROPHILS # BLD AUTO: 3.47 K/UL — SIGNIFICANT CHANGE UP (ref 1.8–7.4)
NEUTROPHILS NFR BLD AUTO: 60.2 % — SIGNIFICANT CHANGE UP (ref 43–77)
NRBC # BLD: 0 /100 WBCS — SIGNIFICANT CHANGE UP (ref 0–0)
PLATELET # BLD AUTO: 325 K/UL — SIGNIFICANT CHANGE UP (ref 150–400)
RBC # BLD: 3.77 M/UL — LOW (ref 3.8–5.2)
RBC # FLD: 15.3 % — HIGH (ref 10.3–14.5)
WBC # BLD: 5.76 K/UL — SIGNIFICANT CHANGE UP (ref 3.8–10.5)
WBC # FLD AUTO: 5.76 K/UL — SIGNIFICANT CHANGE UP (ref 3.8–10.5)

## 2024-07-31 PROCEDURE — 99205 OFFICE O/P NEW HI 60 MIN: CPT

## 2024-07-31 PROCEDURE — 93010 ELECTROCARDIOGRAM REPORT: CPT

## 2024-07-31 PROCEDURE — G2211 COMPLEX E/M VISIT ADD ON: CPT

## 2024-08-01 LAB
ALBUMIN MFR SERPL ELPH: 64.3 %
ALBUMIN SERPL-MCNC: 3.7 G/DL
ALBUMIN/GLOB SERPL: 1.8 RATIO
ALPHA1 GLOB MFR SERPL ELPH: 4.8 %
ALPHA1 GLOB SERPL ELPH-MCNC: 0.3 G/DL
ALPHA2 GLOB MFR SERPL ELPH: 12.8 %
ALPHA2 GLOB SERPL ELPH-MCNC: 0.7 G/DL
B-GLOBULIN MFR SERPL ELPH: 10.9 %
B-GLOBULIN SERPL ELPH-MCNC: 0.6 G/DL
DEPRECATED KAPPA LC FREE/LAMBDA SER: 1.03 RATIO
GAMMA GLOB FLD ELPH-MCNC: 0.4 G/DL
GAMMA GLOB MFR SERPL ELPH: 7.2 %
IGA SER QL IEP: 58 MG/DL
IGG SER QL IEP: 457 MG/DL
IGM SER QL IEP: 22 MG/DL
INTERPRETATION SERPL IEP-IMP: NORMAL
KAPPA LC CSF-MCNC: 0.68 MG/DL
KAPPA LC SERPL-MCNC: 0.7 MG/DL
M PROTEIN MFR SERPL ELPH: NORMAL
M PROTEIN SPEC IFE-MCNC: NORMAL
MONOCLON BAND OBS SERPL: NORMAL
PROT SERPL-MCNC: 5.7 G/DL
PROT SERPL-MCNC: 5.7 G/DL

## 2024-08-01 NOTE — PHYSICAL EXAM
[Fully active, able to carry on all pre-disease performance without restriction] : Status 0 - Fully active, able to carry on all pre-disease performance without restriction [Normal] : affect appropriate [de-identified] : on room air,

## 2024-08-01 NOTE — ASSESSMENT
[FreeTextEntry1] :  Patient is a 57 yo F, diagnosed with IgA lambda MM  Asymptomatic multiple myeloma, ISS stage I,  R-ISS stage I,  Disease Status: VGPR   Patient's history, pathology, imaging, and plan was reviewed. We re-discussed the auto-transplant process as well as its risks and benefits. All questions were answered. She has not spoken to her family as of yet, but plans to do so this weekend. I strongly suggested she speak to them asap and to bring a family member at her next appointment.   Patient is planned for pre-collection work up on 8/12.  Plan to proceed with growth factor injections to start on 9/6 (Rev to end 8/15), catheter placement on 9/9, collection on 9/10-9/11, with tentative admission on 10/3.  Follow-up: 8/28/24  Patient was seen with Nicky Hall, transplant coordinator.   Willow Palacios MD  [facility]plantation [facility] Cellular [facility]

## 2024-08-01 NOTE — PHYSICAL EXAM
[Fully active, able to carry on all pre-disease performance without restriction] : Status 0 - Fully active, able to carry on all pre-disease performance without restriction [Normal] : affect appropriate [de-identified] : on room air,  Arava Counseling:  Patient counseled regarding adverse effects of Arava including but not limited to nausea, vomiting, abnormalities in liver function tests. Patients may develop mouth sores, rash, diarrhea, and abnormalities in blood counts. The patient understands that monitoring is required including LFTs and blood counts.  There is a rare possibility of scarring of the liver and lung problems that can occur when taking methotrexate. Persistent nausea, loss of appetite, pale stools, dark urine, cough, and shortness of breath should be reported immediately. Patient advised to discontinue Arava treatment and consult with a physician prior to attempting conception. The patient will have to undergo a treatment to eliminate Arava from the body prior to conception.

## 2024-08-01 NOTE — HISTORY OF PRESENT ILLNESS
[de-identified] :   Patient is a 58F , with IgA lambda Multiple myeloma here for follow up on auto-HSCT.    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 She started cycle 5 of Jolene-RVd on 7/26/24.    Oncologic History:  The patient was initially found to have anemia on routine annual evaluation with her primary care physician. Further testing found an elevated protein gap (TProtein 9.2, albumin 4.3). No renal dysfunction or hypercalcemia. Paraprotein evaluation was notable for 2 IgA lambda bands (M-spike 1: 3.1, M-spike 2: 0.2). She had normal iron, B12, and thyroid studies. No proteinuria. She established care with a hematologist and baseline labs were consistent with Multiple myeloma.    Imaging ---- Pre-treatment PET/CT on 3/15/24 did not show any lytic lesions. There was hypermetabolic uptake within the thyroid gland, for which sonography is suggested to exclude focal hypermetabolic nodules  Treatment ---Joleen-VRd C1D1 3/25/24; now s/p 5 cycles, C5D1 on 7/26/24  ---Radiation: no    Family History: - Cousin - breast cancer (passed away in her 60's) - Sister - T2DM, hypothyroidism, glaucoma - Father - MI - Mother - Parkisonism  Social: -The patient was born in , parents from  and currently lives in [place]. - Patient lives with her significant other, has 2 children, (37G, 24G - healthy) - Currently works as a dentist  -  Non-smoker, social alcohol use, no other drug use

## 2024-08-01 NOTE — HISTORY OF PRESENT ILLNESS
[de-identified] :   Patient is a 58F , with IgA lambda Multiple myeloma here for follow up on auto-HSCT.    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 She started cycle 5 of Jolene-RVd on 7/26/24.    Oncologic History:  The patient was initially found to have anemia on routine annual evaluation with her primary care physician. Further testing found an elevated protein gap (TProtein 9.2, albumin 4.3). No renal dysfunction or hypercalcemia. Paraprotein evaluation was notable for 2 IgA lambda bands (M-spike 1: 3.1, M-spike 2: 0.2). She had normal iron, B12, and thyroid studies. No proteinuria. She established care with a hematologist and baseline labs were consistent with Multiple myeloma.    Imaging ---- Pre-treatment PET/CT on 3/15/24 did not show any lytic lesions. There was hypermetabolic uptake within the thyroid gland, for which sonography is suggested to exclude focal hypermetabolic nodules  Treatment ---Jolene-VRd C1D1 3/25/24; now s/p 5 cycles, C5D1 on 7/26/24  ---Radiation: no    Family History: - Cousin - breast cancer (passed away in her 60's) - Sister - T2DM, hypothyroidism, glaucoma - Father - MI - Mother - Parkisonism  Social: -The patient was born in , parents from  and currently lives in [place]. - Patient lives with her significant other, has 2 children, (37G, 24G - healthy) - Currently works as a dentist  -  Non-smoker, social alcohol use, no other drug use

## 2024-08-01 NOTE — ASSESSMENT
[FreeTextEntry1] :  Patient is a 57 yo F, diagnosed with IgA lambda MM  Asymptomatic multiple myeloma, ISS stage I,  R-ISS stage I,  Disease Status: VGPR   Patient's history, pathology, imaging, and plan was reviewed. We re-discussed the auto-transplant process as well as its risks and benefits. All questions were answered. She has not spoken to her family as of yet, but plans to do so this weekend. I strongly suggested she speak to them asap and to bring a family member at her next appointment.   Patient is planned for pre-collection work up on 8/12.  Plan to proceed with growth factor injections to start on 9/6 (Rev to end 8/15), catheter placement on 9/9, collection on 9/10-9/11, with tentative admission on 10/3.  Follow-up: 8/28/24  Patient was seen with Nicky Hall, transplant coordinator.   Willow Palacios MD  [facility] Adult Transplantation and Cellular Therapy Program

## 2024-08-02 ENCOUNTER — RESULT REVIEW (OUTPATIENT)
Age: 59
End: 2024-08-02

## 2024-08-02 ENCOUNTER — APPOINTMENT (OUTPATIENT)
Age: 59
End: 2024-08-02

## 2024-08-02 LAB
BASOPHILS # BLD AUTO: 0.1 K/UL — SIGNIFICANT CHANGE UP (ref 0–0.2)
BASOPHILS NFR BLD AUTO: 2.6 % — HIGH (ref 0–2)
EOSINOPHIL # BLD AUTO: 0.4 K/UL — SIGNIFICANT CHANGE UP (ref 0–0.5)
EOSINOPHIL NFR BLD AUTO: 7.4 % — HIGH (ref 0–6)
HCT VFR BLD CALC: 39.3 % — SIGNIFICANT CHANGE UP (ref 34.5–45)
HGB BLD-MCNC: 12.8 G/DL — SIGNIFICANT CHANGE UP (ref 11.5–15.5)
LYMPHOCYTES # BLD AUTO: 1.2 K/UL — SIGNIFICANT CHANGE UP (ref 1–3.3)
LYMPHOCYTES # BLD AUTO: 21.9 % — SIGNIFICANT CHANGE UP (ref 13–44)
MCHC RBC-ENTMCNC: 32 PG — SIGNIFICANT CHANGE UP (ref 27–34)
MCHC RBC-ENTMCNC: 32.7 G/DL — SIGNIFICANT CHANGE UP (ref 32–36)
MCV RBC AUTO: 98.1 FL — SIGNIFICANT CHANGE UP (ref 80–100)
MONOCYTES # BLD AUTO: 0.2 K/UL — SIGNIFICANT CHANGE UP (ref 0–0.9)
MONOCYTES NFR BLD AUTO: 4.4 % — SIGNIFICANT CHANGE UP (ref 2–14)
NEUTROPHILS # BLD AUTO: 3.6 K/UL — SIGNIFICANT CHANGE UP (ref 1.8–7.4)
NEUTROPHILS NFR BLD AUTO: 63.7 % — SIGNIFICANT CHANGE UP (ref 43–77)
PLATELET # BLD AUTO: 300 K/UL — SIGNIFICANT CHANGE UP (ref 150–400)
RBC # BLD: 4.01 M/UL — SIGNIFICANT CHANGE UP (ref 3.8–5.2)
RBC # FLD: 14.1 % — SIGNIFICANT CHANGE UP (ref 10.3–14.5)
WBC # BLD: 5.7 K/UL — SIGNIFICANT CHANGE UP (ref 3.8–10.5)
WBC # FLD AUTO: 5.7 K/UL — SIGNIFICANT CHANGE UP (ref 3.8–10.5)

## 2024-08-07 LAB — G6PD SER-CCNC: 8 U/G HGB

## 2024-08-09 ENCOUNTER — RESULT REVIEW (OUTPATIENT)
Age: 59
End: 2024-08-09

## 2024-08-09 ENCOUNTER — APPOINTMENT (OUTPATIENT)
Age: 59
End: 2024-08-09

## 2024-08-09 LAB
BASOPHILS # BLD AUTO: 0.2 K/UL — SIGNIFICANT CHANGE UP (ref 0–0.2)
BASOPHILS NFR BLD AUTO: 2.8 % — HIGH (ref 0–2)
EOSINOPHIL # BLD AUTO: 0.5 K/UL — SIGNIFICANT CHANGE UP (ref 0–0.5)
EOSINOPHIL NFR BLD AUTO: 6.1 % — HIGH (ref 0–6)
HCT VFR BLD CALC: 38.9 % — SIGNIFICANT CHANGE UP (ref 34.5–45)
HGB BLD-MCNC: 12.7 G/DL — SIGNIFICANT CHANGE UP (ref 11.5–15.5)
LYMPHOCYTES # BLD AUTO: 1.1 K/UL — SIGNIFICANT CHANGE UP (ref 1–3.3)
LYMPHOCYTES # BLD AUTO: 13.8 % — SIGNIFICANT CHANGE UP (ref 13–44)
MCHC RBC-ENTMCNC: 32.2 PG — SIGNIFICANT CHANGE UP (ref 27–34)
MCHC RBC-ENTMCNC: 32.6 G/DL — SIGNIFICANT CHANGE UP (ref 32–36)
MCV RBC AUTO: 98.6 FL — SIGNIFICANT CHANGE UP (ref 80–100)
MONOCYTES # BLD AUTO: 0.5 K/UL — SIGNIFICANT CHANGE UP (ref 0–0.9)
MONOCYTES NFR BLD AUTO: 6.8 % — SIGNIFICANT CHANGE UP (ref 2–14)
NEUTROPHILS # BLD AUTO: 5.5 K/UL — SIGNIFICANT CHANGE UP (ref 1.8–7.4)
NEUTROPHILS NFR BLD AUTO: 70.4 % — SIGNIFICANT CHANGE UP (ref 43–77)
PLATELET # BLD AUTO: 244 K/UL — SIGNIFICANT CHANGE UP (ref 150–400)
RBC # BLD: 3.95 M/UL — SIGNIFICANT CHANGE UP (ref 3.8–5.2)
RBC # FLD: 14.6 % — HIGH (ref 10.3–14.5)
WBC # BLD: 7.9 K/UL — SIGNIFICANT CHANGE UP (ref 3.8–10.5)
WBC # FLD AUTO: 7.9 K/UL — SIGNIFICANT CHANGE UP (ref 3.8–10.5)

## 2024-08-12 ENCOUNTER — APPOINTMENT (OUTPATIENT)
Dept: CV DIAGNOSITCS | Facility: HOSPITAL | Age: 59
End: 2024-08-12

## 2024-08-12 ENCOUNTER — APPOINTMENT (OUTPATIENT)
Dept: PULMONOLOGY | Facility: CLINIC | Age: 59
End: 2024-08-12

## 2024-08-12 ENCOUNTER — RESULT REVIEW (OUTPATIENT)
Age: 59
End: 2024-08-12

## 2024-08-12 PROCEDURE — 94729 DIFFUSING CAPACITY: CPT

## 2024-08-12 PROCEDURE — 94010 BREATHING CAPACITY TEST: CPT

## 2024-08-12 PROCEDURE — 94726 PLETHYSMOGRAPHY LUNG VOLUMES: CPT

## 2024-08-14 ENCOUNTER — RESULT REVIEW (OUTPATIENT)
Age: 59
End: 2024-08-14

## 2024-08-14 ENCOUNTER — APPOINTMENT (OUTPATIENT)
Dept: HEMATOLOGY ONCOLOGY | Facility: CLINIC | Age: 59
End: 2024-08-14
Payer: COMMERCIAL

## 2024-08-14 VITALS
SYSTOLIC BLOOD PRESSURE: 150 MMHG | TEMPERATURE: 97.4 F | RESPIRATION RATE: 16 BRPM | WEIGHT: 135 LBS | OXYGEN SATURATION: 99 % | HEART RATE: 80 BPM | BODY MASS INDEX: 24.84 KG/M2 | HEIGHT: 62 IN | DIASTOLIC BLOOD PRESSURE: 83 MMHG

## 2024-08-14 LAB
BASOPHILS # BLD AUTO: 0.1 K/UL — SIGNIFICANT CHANGE UP (ref 0–0.2)
BASOPHILS NFR BLD AUTO: 2.5 % — HIGH (ref 0–2)
EOSINOPHIL # BLD AUTO: 0.3 K/UL — SIGNIFICANT CHANGE UP (ref 0–0.5)
EOSINOPHIL NFR BLD AUTO: 5.8 % — SIGNIFICANT CHANGE UP (ref 0–6)
HCT VFR BLD CALC: 38.6 % — SIGNIFICANT CHANGE UP (ref 34.5–45)
HGB BLD-MCNC: 12.6 G/DL — SIGNIFICANT CHANGE UP (ref 11.5–15.5)
LYMPHOCYTES # BLD AUTO: 1.3 K/UL — SIGNIFICANT CHANGE UP (ref 1–3.3)
LYMPHOCYTES # BLD AUTO: 22.7 % — SIGNIFICANT CHANGE UP (ref 13–44)
MCHC RBC-ENTMCNC: 31.9 PG — SIGNIFICANT CHANGE UP (ref 27–34)
MCHC RBC-ENTMCNC: 32.6 G/DL — SIGNIFICANT CHANGE UP (ref 32–36)
MCV RBC AUTO: 97.9 FL — SIGNIFICANT CHANGE UP (ref 80–100)
MONOCYTES # BLD AUTO: 0.5 K/UL — SIGNIFICANT CHANGE UP (ref 0–0.9)
MONOCYTES NFR BLD AUTO: 9.2 % — SIGNIFICANT CHANGE UP (ref 2–14)
NEUTROPHILS # BLD AUTO: 3.4 K/UL — SIGNIFICANT CHANGE UP (ref 1.8–7.4)
NEUTROPHILS NFR BLD AUTO: 59.8 % — SIGNIFICANT CHANGE UP (ref 43–77)
PLATELET # BLD AUTO: 214 K/UL — SIGNIFICANT CHANGE UP (ref 150–400)
RBC # BLD: 3.94 M/UL — SIGNIFICANT CHANGE UP (ref 3.8–5.2)
RBC # FLD: 14.5 % — SIGNIFICANT CHANGE UP (ref 10.3–14.5)
WBC # BLD: 5.7 K/UL — SIGNIFICANT CHANGE UP (ref 3.8–10.5)
WBC # FLD AUTO: 5.7 K/UL — SIGNIFICANT CHANGE UP (ref 3.8–10.5)

## 2024-08-14 PROCEDURE — 99215 OFFICE O/P EST HI 40 MIN: CPT

## 2024-08-14 NOTE — HISTORY OF PRESENT ILLNESS
[de-identified] : IgA lambda multiple myeloma  VASILE SNYDER is a 58 year woman with PMH of DCIS (s/p bilateral mastectomies), who presents for Hematology evaluation of IgA lambda monoclonal gammopathy.  She recently established care with a new PMD Dr. Cathy Real. She had not followed up with anyone since 2020 due to the COVID pandemic. She only reports an occasional sciatica-like pain in the right hip. Otherwise, she has not had any symptoms.  Routine labs from an annual visit with her primary care physician on 1/19/24 revealed macrocytic anemia (Hgb 10.6,  - new from 2020) and an elevated protein gap (TProtein 9.2, albumin 4.3). No renal dysfunction or hypercalcemia. Paraprotein evaluation was notable for 2 IgA lambda bands (M-spike 1: 3.1, M-spike 2: 0.2). She had normal iron, B12, and thyroid studies. No proteinuria.  Interval History: - She established care in our practice on 2/29/24. Her labs were notable for mild anemia (Hgb 11), two IgA lambda bands (M-spike 1: 3.3, M-spike 2: 0.2), IgA 4555 with low IgG and IgM, and normal K/L ratio. She had normal renal function, calcium, and albumin. LDH and B2-microglobulin were normal. UPEP showed an IgA lambda band. - Bone marrow biopsy on 3/1/24 showed 40-60% plasma cells by  immunostaining. FISH panel showed 3 copies of FGFR3 (20%) and RB1 deletion (18.5%). She had a normal karyotype. - PET/CT on 3/15/24 did not show any lytic lesions. There was hypermetabolic uptake within the thyroid gland, for which sonography is suggested to exclude focal hypermetabolic nodules.  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 - Has 2 children, one lives in [place] and the other in [place] - Never smoked tobacco. Drinks 1-2 glasses of wine maybe twice a week. No illicit drug use. [de-identified] : She was here with her sister for a follow up.  She just completed cycle 5 of D-RVD.   Feeling in her usual state of health.  No complaints.  Her appetite is good, no weight loss.  No bone pains.  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

## 2024-08-14 NOTE — ASSESSMENT
[FreeTextEntry1] : This is a 58 year woman with PMH of DCIS (s/p bilateral mastectomies) with IgA lambda multiple myeloma.  IgA lambda multiple myeloma:  - Diagnosis:  Routine labs from an annual visit with her primary care physician on 1/19/24 revealed macrocytic anemia (Hgb 10.6,  - new from 2020) and an elevated protein gap (TProtein 9.2, albumin 4.3). No renal dysfunction or hypercalcemia. Paraprotein evaluation was notable for two IgA lambda bands (M-spike 1: 3.3, M-spike 2: 0.2), IgA 4555 with low IgG and IgM, and normal K/L ratio. She had normal renal function, calcium, and albumin. LDH and B2-microglobulin were normal. UPEP showed an IgA lambda band.   Bone marrow biopsy on 3/1/24 showed 40-60% plasma cells by  immunostaining. FISH panel showed 3 copies of FGFR3 (20%) and RB1 deletion (18.5%). She had a normal karyotype.   PET/CT on 3/15/24 did not show any lytic lesions.   Staging: R-ISS I (low-risk) Treatment plan: Will plan to treat with Jolene-VRd.  She just completed cycle 5 which she tolerated well.   Labs-  3/25/24- M spike 3.4, IgA 4329, k/l FLC ratio 1.24 4/29/24- M spike 1.2, IgA 1447, k/l FLC ratio 1.55 5/25/24- M spike 0.4, IgA 343, k/l FLC ratio 1.68 6/26/24- M spike 0.2, IgA 107, k/l FLC ratio 0.38 7/26/24- M spike 0.2, IgA 58, k/l FLC ratio 0.68  Repeat her immunoelectrophoresis today.  Continue aspirin/acyclovir prophylaxis.  Reglan for nausea as zofran caused constipation.   She completed her pretransplant testing.  Plan for collection early Sept/tentative admission on 10/3.   Thyroid ultrasound to evaluate nodule- biopsy 6/13- ATYPIA OF UNDETERMINED SIGNIFICANCE (AUS)    (Category III) Endocriology e-referral sent  Right antecubital lipoma?  check ultrasound.  She will follow up post transplant.

## 2024-08-16 ENCOUNTER — RESULT REVIEW (OUTPATIENT)
Age: 59
End: 2024-08-16

## 2024-08-16 ENCOUNTER — APPOINTMENT (OUTPATIENT)
Dept: HEMATOLOGY ONCOLOGY | Facility: CLINIC | Age: 59
End: 2024-08-16
Payer: COMMERCIAL

## 2024-08-16 ENCOUNTER — APPOINTMENT (OUTPATIENT)
Age: 59
End: 2024-08-16

## 2024-08-16 ENCOUNTER — LABORATORY RESULT (OUTPATIENT)
Age: 59
End: 2024-08-16

## 2024-08-16 VITALS
BODY MASS INDEX: 23.71 KG/M2 | HEART RATE: 77 BPM | WEIGHT: 129.63 LBS | SYSTOLIC BLOOD PRESSURE: 127 MMHG | RESPIRATION RATE: 16 BRPM | OXYGEN SATURATION: 99 % | DIASTOLIC BLOOD PRESSURE: 82 MMHG | TEMPERATURE: 98.3 F

## 2024-08-16 DIAGNOSIS — C90.00 MULTIPLE MYELOMA NOT HAVING ACHIEVED REMISSION: ICD-10-CM

## 2024-08-16 LAB
BASOPHILS # BLD AUTO: 0.14 K/UL — SIGNIFICANT CHANGE UP (ref 0–0.2)
BASOPHILS NFR BLD AUTO: 2.3 % — HIGH (ref 0–2)
EOSINOPHIL # BLD AUTO: 0.8 K/UL — HIGH (ref 0–0.5)
EOSINOPHIL NFR BLD AUTO: 13.2 % — HIGH (ref 0–6)
HCT VFR BLD CALC: 34.8 % — SIGNIFICANT CHANGE UP (ref 34.5–45)
HGB BLD-MCNC: 12 G/DL — SIGNIFICANT CHANGE UP (ref 11.5–15.5)
IMM GRANULOCYTES NFR BLD AUTO: 0.3 % — SIGNIFICANT CHANGE UP (ref 0–0.9)
LYMPHOCYTES # BLD AUTO: 0.9 K/UL — LOW (ref 1–3.3)
LYMPHOCYTES # BLD AUTO: 14.8 % — SIGNIFICANT CHANGE UP (ref 13–44)
MCHC RBC-ENTMCNC: 32.1 PG — SIGNIFICANT CHANGE UP (ref 27–34)
MCHC RBC-ENTMCNC: 34.5 G/DL — SIGNIFICANT CHANGE UP (ref 32–36)
MCV RBC AUTO: 93 FL — SIGNIFICANT CHANGE UP (ref 80–100)
MONOCYTES # BLD AUTO: 0.57 K/UL — SIGNIFICANT CHANGE UP (ref 0–0.9)
MONOCYTES NFR BLD AUTO: 9.4 % — SIGNIFICANT CHANGE UP (ref 2–14)
NEUTROPHILS # BLD AUTO: 3.65 K/UL — SIGNIFICANT CHANGE UP (ref 1.8–7.4)
NEUTROPHILS NFR BLD AUTO: 60 % — SIGNIFICANT CHANGE UP (ref 43–77)
NRBC # BLD: 0 /100 WBCS — SIGNIFICANT CHANGE UP (ref 0–0)
OB PNL STL: NEGATIVE — SIGNIFICANT CHANGE UP
PLATELET # BLD AUTO: 219 K/UL — SIGNIFICANT CHANGE UP (ref 150–400)
RBC # BLD: 3.74 M/UL — LOW (ref 3.8–5.2)
RBC # FLD: 15.3 % — HIGH (ref 10.3–14.5)
WBC # BLD: 6.08 K/UL — SIGNIFICANT CHANGE UP (ref 3.8–10.5)
WBC # FLD AUTO: 6.08 K/UL — SIGNIFICANT CHANGE UP (ref 3.8–10.5)

## 2024-08-16 PROCEDURE — 38222 DX BONE MARROW BX & ASPIR: CPT | Mod: LT

## 2024-08-16 NOTE — REASON FOR VISIT
[Bone Marrow Biopsy] : bone marrow biopsy [Bone Marrow Aspiration] : bone marrow aspiration [FreeTextEntry2] : Multiple Myeloma

## 2024-08-16 NOTE — PROCEDURE
[Bone Marrow Biopsy] : bone marrow biopsy [Bone Marrow Aspiration] : bone marrow aspiration  [Patient] : the patient [Verbal Consent Obtained] : verbal consent was obtained prior to the procedure [Patient identification verified] : patient identification verified [Procedure verified and consent obtained] : procedure verified and consent obtained [Laterality verified and correct site marked] : laterality verified and correct site marked [Left] : site: left [Correct positioning] : correct positioning [Correct implant and/ or special equipment obtained] : correct impact and/ or special equipment obtained [Prone] : prone [Superior iliac spine was identified] : the superior iliac spine was identified. [The left posterior iliac crest was prepped with betadine and draped, using sterile technique.] : The left posterior iliac crest was prepped with betadine and draped, using sterile technique. [Lidocaine was injected and into the periosteum overlying the site.] : Lidocaine was injected and into the periosteum overlying the site. [Aspirate] : aspirate [Cytogenetics] : cytogenetics [FISH] : FISH [Other ___] : [unfilled] [Biopsy] : biopsy [Flow Cytometry] : flow cytometry [] : The patient was instructed to remove the bandage the following AM. The patient may bathe. Acetaminophen may be taken for discomfort, as per package directions.If there are any other problems, the patient was instructed to call the office. The patient verbalized understanding, and is aware of the office contact numbers. [FreeTextEntry1] : Multiple Myeloma [FreeTextEntry2] :   10cc of  1% lidocaine was used for the procedure.   WBC: 6.08  K/uL Hgb:  12.0  g/dL Hct:   34.8  % Plts:   219  K/uL   Bone marrow aspiration and biopsy were done. Multiple Myeloma panel requested. [employer] sent TRACKING NUMBER: [id number]

## 2024-08-18 ENCOUNTER — OUTPATIENT (OUTPATIENT)
Dept: OUTPATIENT SERVICES | Facility: HOSPITAL | Age: 59
LOS: 1 days | Discharge: HOME | End: 2024-08-18

## 2024-08-18 DIAGNOSIS — D47.2 MONOCLONAL GAMMOPATHY: ICD-10-CM

## 2024-08-18 DIAGNOSIS — Z98.891 HISTORY OF UTERINE SCAR FROM PREVIOUS SURGERY: Chronic | ICD-10-CM

## 2024-08-18 DIAGNOSIS — Z90.49 ACQUIRED ABSENCE OF OTHER SPECIFIED PARTS OF DIGESTIVE TRACT: Chronic | ICD-10-CM

## 2024-08-18 DIAGNOSIS — D64.9 ANEMIA, UNSPECIFIED: ICD-10-CM

## 2024-08-20 LAB
ALBUMIN SERPL ELPH-MCNC: 4.5 G/DL
ALP BLD-CCNC: 64 U/L
ALT SERPL-CCNC: 39 U/L
ANION GAP SERPL CALC-SCNC: 16 MMOL/L
AST SERPL-CCNC: 20 U/L
BILIRUB SERPL-MCNC: 1.3 MG/DL
BUN SERPL-MCNC: 15 MG/DL
CALCIUM SERPL-MCNC: 9.3 MG/DL
CHLORIDE SERPL-SCNC: 98 MMOL/L
CO2 SERPL-SCNC: 27 MMOL/L
CREAT SERPL-MCNC: 1.05 MG/DL
EGFR: 61 ML/MIN/1.73M2
GLUCOSE SERPL-MCNC: 83 MG/DL
POTASSIUM SERPL-SCNC: 3.8 MMOL/L
PROT SERPL-MCNC: 6.2 G/DL
SODIUM SERPL-SCNC: 140 MMOL/L

## 2024-08-21 LAB
ALBUMIN MFR SERPL ELPH: 67.3 %
ALBUMIN SERPL-MCNC: 4.2 G/DL
ALBUMIN/GLOB SERPL: 2.1 RATIO
ALPHA1 GLOB MFR SERPL ELPH: 4.8 %
ALPHA1 GLOB SERPL ELPH-MCNC: 0.3 G/DL
ALPHA2 GLOB MFR SERPL ELPH: 10.8 %
ALPHA2 GLOB SERPL ELPH-MCNC: 0.7 G/DL
B-GLOBULIN MFR SERPL ELPH: 10.3 %
B-GLOBULIN SERPL ELPH-MCNC: 0.6 G/DL
DEPRECATED KAPPA LC FREE/LAMBDA SER: 2.28 RATIO
GAMMA GLOB FLD ELPH-MCNC: 0.4 G/DL
GAMMA GLOB MFR SERPL ELPH: 6.8 %
IGA SER QL IEP: 44 MG/DL
IGG SER QL IEP: 438 MG/DL
IGM SER QL IEP: 15 MG/DL
INTERPRETATION SERPL IEP-IMP: NORMAL
KAPPA LC CSF-MCNC: 0.29 MG/DL
KAPPA LC SERPL-MCNC: 0.66 MG/DL
M PROTEIN MFR SERPL ELPH: NORMAL
M PROTEIN SPEC IFE-MCNC: NORMAL
MONOCLON BAND OBS SERPL: NORMAL
PROT SERPL-MCNC: 6.2 G/DL
PROT SERPL-MCNC: 6.2 G/DL

## 2024-08-23 ENCOUNTER — APPOINTMENT (OUTPATIENT)
Age: 59
End: 2024-08-23

## 2024-08-28 ENCOUNTER — RESULT REVIEW (OUTPATIENT)
Age: 59
End: 2024-08-28

## 2024-08-28 ENCOUNTER — APPOINTMENT (OUTPATIENT)
Dept: HEMATOLOGY ONCOLOGY | Facility: CLINIC | Age: 59
End: 2024-08-28
Payer: COMMERCIAL

## 2024-08-28 VITALS
SYSTOLIC BLOOD PRESSURE: 167 MMHG | BODY MASS INDEX: 23.59 KG/M2 | WEIGHT: 128.97 LBS | TEMPERATURE: 98.7 F | RESPIRATION RATE: 16 BRPM | OXYGEN SATURATION: 99 % | DIASTOLIC BLOOD PRESSURE: 80 MMHG | HEART RATE: 102 BPM

## 2024-08-28 LAB
ALBUMIN SERPL ELPH-MCNC: 4.3 G/DL — SIGNIFICANT CHANGE UP (ref 3.3–5)
ALP SERPL-CCNC: 64 U/L — SIGNIFICANT CHANGE UP (ref 40–120)
ALT FLD-CCNC: 25 U/L — SIGNIFICANT CHANGE UP (ref 10–45)
ANION GAP SERPL CALC-SCNC: 11 MMOL/L — SIGNIFICANT CHANGE UP (ref 5–17)
AST SERPL-CCNC: 21 U/L — SIGNIFICANT CHANGE UP (ref 10–40)
BILIRUB SERPL-MCNC: 0.9 MG/DL — SIGNIFICANT CHANGE UP (ref 0.2–1.2)
BUN SERPL-MCNC: 18 MG/DL — SIGNIFICANT CHANGE UP (ref 7–23)
CALCIUM SERPL-MCNC: 9.8 MG/DL — SIGNIFICANT CHANGE UP (ref 8.4–10.5)
CHLORIDE SERPL-SCNC: 102 MMOL/L — SIGNIFICANT CHANGE UP (ref 96–108)
CO2 SERPL-SCNC: 27 MMOL/L — SIGNIFICANT CHANGE UP (ref 22–31)
CREAT SERPL-MCNC: 0.99 MG/DL — SIGNIFICANT CHANGE UP (ref 0.5–1.3)
EGFR: 65 ML/MIN/1.73M2 — SIGNIFICANT CHANGE UP
GLUCOSE SERPL-MCNC: 127 MG/DL — HIGH (ref 70–99)
MAGNESIUM SERPL-MCNC: 2.3 MG/DL — SIGNIFICANT CHANGE UP (ref 1.6–2.6)
POTASSIUM SERPL-MCNC: 4 MMOL/L — SIGNIFICANT CHANGE UP (ref 3.5–5.3)
POTASSIUM SERPL-SCNC: 4 MMOL/L — SIGNIFICANT CHANGE UP (ref 3.5–5.3)
PROT SERPL-MCNC: 6.2 G/DL — SIGNIFICANT CHANGE UP (ref 6–8.3)
SODIUM SERPL-SCNC: 140 MMOL/L — SIGNIFICANT CHANGE UP (ref 135–145)

## 2024-08-28 PROCEDURE — G2211 COMPLEX E/M VISIT ADD ON: CPT

## 2024-08-28 PROCEDURE — 99215 OFFICE O/P EST HI 40 MIN: CPT

## 2024-08-29 RX ORDER — FILGRASTIM-SNDZ 300 UG/.5ML
300 INJECTION, SOLUTION INTRAVENOUS; SUBCUTANEOUS
Qty: 10 | Refills: 0 | Status: ACTIVE | COMMUNITY
Start: 2024-08-29 | End: 1900-01-01

## 2024-08-30 ENCOUNTER — APPOINTMENT (OUTPATIENT)
Age: 59
End: 2024-08-30

## 2024-09-06 ENCOUNTER — APPOINTMENT (OUTPATIENT)
Age: 59
End: 2024-09-06

## 2024-09-06 ENCOUNTER — RESULT REVIEW (OUTPATIENT)
Age: 59
End: 2024-09-06

## 2024-09-06 ENCOUNTER — APPOINTMENT (OUTPATIENT)
Dept: HEMATOLOGY ONCOLOGY | Facility: CLINIC | Age: 59
End: 2024-09-06
Payer: COMMERCIAL

## 2024-09-06 VITALS
TEMPERATURE: 98.3 F | BODY MASS INDEX: 23.31 KG/M2 | SYSTOLIC BLOOD PRESSURE: 166 MMHG | HEART RATE: 84 BPM | OXYGEN SATURATION: 97 % | DIASTOLIC BLOOD PRESSURE: 75 MMHG | RESPIRATION RATE: 16 BRPM | WEIGHT: 127.43 LBS

## 2024-09-06 DIAGNOSIS — C90.00 MULTIPLE MYELOMA NOT HAVING ACHIEVED REMISSION: ICD-10-CM

## 2024-09-06 DIAGNOSIS — Z01.818 ENCOUNTER FOR OTHER PREPROCEDURAL EXAMINATION: ICD-10-CM

## 2024-09-06 LAB
ALBUMIN SERPL ELPH-MCNC: 4.2 G/DL
ALP BLD-CCNC: 71 U/L
ALT SERPL-CCNC: 29 U/L
ANION GAP SERPL CALC-SCNC: 9 MMOL/L
APTT BLD: 32.4 SEC
AST SERPL-CCNC: 24 U/L
BILIRUB SERPL-MCNC: 0.9 MG/DL
BUN SERPL-MCNC: 13 MG/DL
CALCIUM SERPL-MCNC: 9.7 MG/DL
CHLORIDE SERPL-SCNC: 102 MMOL/L
CO2 SERPL-SCNC: 29 MMOL/L
CREAT SERPL-MCNC: 0.94 MG/DL
EGFR: 69 ML/MIN/1.73M2
GLUCOSE SERPL-MCNC: 104 MG/DL
INR PPP: 1.02 RATIO
MAGNESIUM SERPL-MCNC: 2.3 MG/DL
PHOSPHATE SERPL-MCNC: 3.5 MG/DL
POTASSIUM SERPL-SCNC: 4.3 MMOL/L
PROT SERPL-MCNC: 6.6 G/DL
PT BLD: 11.5 SEC
SODIUM SERPL-SCNC: 139 MMOL/L

## 2024-09-06 PROCEDURE — 99215 OFFICE O/P EST HI 40 MIN: CPT

## 2024-09-06 NOTE — HISTORY OF PRESENT ILLNESS
[de-identified] :  Patient is a 58F , with IgA lambda Multiple myeloma here for follow up on auto-HSCT.    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 She is accompagnied by her sister Sunitha. Patient reports to be doing well. She denies any fevers, nausea, vomiting, chest pain or any other pain.    Oncologic History:  The patient was initially found to have anemia on routine annual evaluation with her primary care physician. Further testing found an elevated protein gap (TProtein 9.2, albumin 4.3). No renal dysfunction or hypercalcemia. Paraprotein evaluation was notable for 2 IgA lambda bands (M-spike 1: 3.1, M-spike 2: 0.2). She had normal iron, B12, and thyroid studies. No proteinuria. She established care with a hematologist and baseline labs were consistent with Multiple myeloma.    Imaging ---- Pre-treatment PET/CT on 3/15/24 did not show any lytic lesions. There was hypermetabolic uptake within the thyroid gland, for which sonography is suggested to exclude focal hypermetabolic nodules  Pathology:  ---Bone marrow biopsy: 8/16/24: Cellular marrow (40-50%) with maturing trilineage hematopoiesis with no increase in plasma cell population          FISH: persistent deletion of RB1  Treatment ---Jolene-VRd C1D1 3/25/24; now s/p 5 cycles, C5D1 on 7/26/24  ---Radiation: no    Family History: - Cousin - breast cancer (passed away in her 60's) - Sister - T2DM, hypothyroidism, glaucoma - Father - MI - Mother - Parkisonism  Social: -The patient was born in , parents from  and currently lives in [place]. - Patient lives with her significant other, has 2 children, (37G, 24G - healthy) - Currently works as a dentist  -  Non-smoker, social alcohol use, no other drug use [de-identified] : 9/6/24: Patient is seen for a teaching visit prior to stem cell mobilization. Denies fever, chills, nausea, vomiting, diarrhea, cough or any signs of active infection/ bleeding.

## 2024-09-06 NOTE — PHYSICAL EXAM
[Fully active, able to carry on all pre-disease performance without restriction] : Status 0 - Fully active, able to carry on all pre-disease performance without restriction [Normal] : affect appropriate [de-identified] : on room air,

## 2024-09-06 NOTE — HISTORY OF PRESENT ILLNESS
[de-identified] :  Patient is a 58F , with IgA lambda Multiple myeloma here for follow up on auto-HSCT.    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 She is accompagnied by her sister Sunitha. Patient reports to be doing well. She denies any fevers, nausea, vomiting, chest pain or any other pain.    Oncologic History:  The patient was initially found to have anemia on routine annual evaluation with her primary care physician. Further testing found an elevated protein gap (TProtein 9.2, albumin 4.3). No renal dysfunction or hypercalcemia. Paraprotein evaluation was notable for 2 IgA lambda bands (M-spike 1: 3.1, M-spike 2: 0.2). She had normal iron, B12, and thyroid studies. No proteinuria. She established care with a hematologist and baseline labs were consistent with Multiple myeloma.    Imaging ---- Pre-treatment PET/CT on 3/15/24 did not show any lytic lesions. There was hypermetabolic uptake within the thyroid gland, for which sonography is suggested to exclude focal hypermetabolic nodules  Pathology:  ---Bone marrow biopsy: 8/16/24: Cellular marrow (40-50%) with maturing trilineage hematopoiesis with no increase in plasma cell population          FISH: persistent deletion of RB1  Treatment ---Jolene-VRd C1D1 3/25/24; now s/p 5 cycles, C5D1 on 7/26/24  ---Radiation: no    Family History: - Cousin - breast cancer (passed away in her 60's) - Sister - T2DM, hypothyroidism, glaucoma - Father - MI - Mother - Parkisonism  Social: -The patient was born in , parents from  and currently lives in [place]. - Patient lives with her significant other, has 2 children, (37G, 24G - healthy) - Currently works as a dentist  -  Non-smoker, social alcohol use, no other drug use [de-identified] : 9/6/24: Patient is seen for a teaching visit prior to stem cell mobilization. Denies fever, chills, nausea, vomiting, diarrhea, cough or any signs of active infection/ bleeding.

## 2024-09-06 NOTE — ASSESSMENT
[FreeTextEntry1] : 8/28/24:  Patient is a 57 yo F, diagnosed with IgA lambda MM  Asymptomatic multiple myeloma, ISS stage I,  R-ISS stage I,  Disease Status: VGPR   Patient's history, pathology, imaging, and plan was reviewed. We re-discussed the auto-transplant process as well as its risks and benefits. All questions were answered.  Patient has completed precollection work up.  We went over the collection process in detail and discussed the catheter placement.  Plan to proceed with growth factor injections to start on 9/6, catheter placement on 9/9, collection on 9/10-9/11, with tentative admission on 10/3. Follow-up: 3 weeks   9/6/24:  is seen for a teaching visit pre stem cell Mobilization CBC ,CMP, Magnesium,Phosphorus levels reviewed;counts stable She will receive Zarxio 600 mcg subcutaneous daily 9/6/24-9/10/24;teaching done by DORIS Hall Explained to patient how to administer Zarxio. Most common side effects explained. Tunneled catheter placement has been scheduled on 9/9/24 at [facility] scheduled on 9/9/24 at [facility] Stem cell collection scheduled on 9/10/24 at [facility] Recommended to take Claritin daily for 5 days starting 9/6/24 She is off Aspirin and Revlimid May take Tylenol PRN for pain or fever Avoid Advil, Aleve or Aspirin Encouraged to increase calcium in diet over the next few days Questions and concerns addressed Reassurance provided Tentative hospital admission for stem cell transplant on 10/3/24  I examined patient under Dr. Palacios's supervision and Dr. Palacios agrees to plan of care as listed above

## 2024-09-06 NOTE — PHYSICAL EXAM
[Fully active, able to carry on all pre-disease performance without restriction] : Status 0 - Fully active, able to carry on all pre-disease performance without restriction [Normal] : affect appropriate [de-identified] : on room air,

## 2024-09-06 NOTE — PHYSICAL EXAM
[Fully active, able to carry on all pre-disease performance without restriction] : Status 0 - Fully active, able to carry on all pre-disease performance without restriction [Normal] : affect appropriate [de-identified] : on room air,

## 2024-09-06 NOTE — ASSESSMENT
[FreeTextEntry1] : 8/28/24:  Patient is a 57 yo F, diagnosed with IgA lambda MM  Asymptomatic multiple myeloma, ISS stage I,  R-ISS stage I,  Disease Status: VGPR   Patient's history, pathology, imaging, and plan was reviewed. We re-discussed the auto-transplant process as well as its risks and benefits. All questions were answered.  Patient has completed precollection work up.  We went over the collection process in detail and discussed the catheter placement.  Plan to proceed with growth factor injections to start on 9/6, catheter placement on 9/9, collection on 9/10-9/11, with tentative admission on 10/3. Follow-up: 3 weeks   9/6/24:  is seen for a teaching visit pre stem cell Mobilization CBC ,CMP, Magnesium,Phosphorus levels reviewed;counts stable She will receive Zarxio 600 mcg subcutaneous daily 9/6/24-9/10/24;teaching done by DORIS Hall Explained to patient how to administer Zarxio. Most common side effects explained. Tunneled catheter placement has been scheduled on 9/9/24 at [facility] scheduled on 9/9/24 at [facility] Stem cell collection scheduled on 9/10/24 at [facility] Recommended to take Claritin daily for 5 days starting 9/6/24 She is off Aspirin and Revlimid May take Tylenol PRN for pain or fever Avoid Advil, Aleve or Aspirin Encouraged to increase calcium in diet over the next few days Questions and concerns addressed Reassurance provided Tentative hospital admission for stem cell transplant on 10/3/24  I examined patient under Dr. Palacios's supervision and Dr. Palacios agrees to plan of care as listed above

## 2024-09-06 NOTE — HISTORY OF PRESENT ILLNESS
[de-identified] :  Patient is a 58F , with IgA lambda Multiple myeloma here for follow up on auto-HSCT.    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 She is accompagnied by her sister Sunitha. Patient reports to be doing well. She denies any fevers, nausea, vomiting, chest pain or any other pain.    Oncologic History:  The patient was initially found to have anemia on routine annual evaluation with her primary care physician. Further testing found an elevated protein gap (TProtein 9.2, albumin 4.3). No renal dysfunction or hypercalcemia. Paraprotein evaluation was notable for 2 IgA lambda bands (M-spike 1: 3.1, M-spike 2: 0.2). She had normal iron, B12, and thyroid studies. No proteinuria. She established care with a hematologist and baseline labs were consistent with Multiple myeloma.    Imaging ---- Pre-treatment PET/CT on 3/15/24 did not show any lytic lesions. There was hypermetabolic uptake within the thyroid gland, for which sonography is suggested to exclude focal hypermetabolic nodules  Pathology:  ---Bone marrow biopsy: 8/16/24: Cellular marrow (40-50%) with maturing trilineage hematopoiesis with no increase in plasma cell population          FISH: persistent deletion of RB1  Treatment ---Jolene-VRd C1D1 3/25/24; now s/p 5 cycles, C5D1 on 7/26/24  ---Radiation: no    Family History: - Cousin - breast cancer (passed away in her 60's) - Sister - T2DM, hypothyroidism, glaucoma - Father - MI - Mother - Parkisonism  Social: -The patient was born in , parents from  and currently lives in [place]. - Patient lives with her significant other, has 2 children, (37G, 24G - healthy) - Currently works as a dentist  -  Non-smoker, social alcohol use, no other drug use [de-identified] : 9/6/24: Patient is seen for a teaching visit prior to stem cell mobilization. Denies fever, chills, nausea, vomiting, diarrhea, cough or any signs of active infection/ bleeding.

## 2024-09-06 NOTE — ASSESSMENT
[FreeTextEntry1] : 8/28/24:  Patient is a 59 yo F, diagnosed with IgA lambda MM  Asymptomatic multiple myeloma, ISS stage I,  R-ISS stage I,  Disease Status: VGPR   Patient's history, pathology, imaging, and plan was reviewed. We re-discussed the auto-transplant process as well as its risks and benefits. All questions were answered.  Patient has completed precollection work up.  We went over the collection process in detail and discussed the catheter placement.  Plan to proceed with growth factor injections to start on 9/6, catheter placement on 9/9, collection on 9/10-9/11, with tentative admission on 10/3. Follow-up: 3 weeks   9/6/24:  is seen for a teaching visit pre stem cell Mobilization CBC ,CMP, Magnesium,Phosphorus levels reviewed;counts stable She will receive Zarxio 600 mcg subcutaneous daily 9/6/24-9/10/24;teaching done by DORIS Hall Explained to patient how to administer Zarxio. Most common side effects explained. Tunneled catheter placement has been scheduled on 9/9/24 at [facility] scheduled on 9/9/24 at [facility] Stem cell collection scheduled on 9/10/24 at [facility] Recommended to take Claritin daily for 5 days starting 9/6/24 She is off Aspirin and Revlimid May take Tylenol PRN for pain or fever Avoid Advil, Aleve or Aspirin Encouraged to increase calcium in diet over the next few days Questions and concerns addressed Reassurance provided Tentative hospital admission for stem cell transplant on 10/3/24  I examined patient under Dr. Palacios's supervision and Dr. Palacios agrees to plan of care as listed above

## 2024-09-06 NOTE — REASON FOR VISIT
[Follow-Up Visit] : a follow-up visit for [FreeTextEntry2] : MM-Teaching visit pre stem cell Mobilization

## 2024-09-07 LAB
HSV 1+2 IGG SER IA-IMP: NEGATIVE
HSV 1+2 IGG SER IA-IMP: NEGATIVE
HSV1 IGG SER QL: <0.01 INDEX
HSV2 IGG SER QL: 0.04 INDEX
MEV IGG FLD QL IA: 73.4 AU/ML
MEV IGG+IGM SER-IMP: POSITIVE
MUV AB SER-ACNC: NEGATIVE
MUV IGG SER QL IA: 5.76 AU/ML
RUBV IGG FLD-ACNC: 1.01 INDEX
RUBV IGG SER-IMP: POSITIVE

## 2024-09-09 ENCOUNTER — OUTPATIENT (OUTPATIENT)
Dept: OUTPATIENT SERVICES | Facility: HOSPITAL | Age: 59
LOS: 1 days | End: 2024-09-09
Payer: COMMERCIAL

## 2024-09-09 ENCOUNTER — TRANSCRIPTION ENCOUNTER (OUTPATIENT)
Age: 59
End: 2024-09-09

## 2024-09-09 ENCOUNTER — RESULT REVIEW (OUTPATIENT)
Age: 59
End: 2024-09-09

## 2024-09-09 ENCOUNTER — APPOINTMENT (OUTPATIENT)
Dept: INFUSION THERAPY | Facility: HOSPITAL | Age: 59
End: 2024-09-09

## 2024-09-09 VITALS
WEIGHT: 130.07 LBS | DIASTOLIC BLOOD PRESSURE: 74 MMHG | SYSTOLIC BLOOD PRESSURE: 144 MMHG | HEIGHT: 62 IN | OXYGEN SATURATION: 99 % | TEMPERATURE: 98 F | HEART RATE: 91 BPM | RESPIRATION RATE: 18 BRPM

## 2024-09-09 VITALS
OXYGEN SATURATION: 100 % | HEART RATE: 70 BPM | SYSTOLIC BLOOD PRESSURE: 111 MMHG | DIASTOLIC BLOOD PRESSURE: 68 MMHG | RESPIRATION RATE: 15 BRPM

## 2024-09-09 DIAGNOSIS — C90.00 MULTIPLE MYELOMA NOT HAVING ACHIEVED REMISSION: ICD-10-CM

## 2024-09-09 DIAGNOSIS — Z90.49 ACQUIRED ABSENCE OF OTHER SPECIFIED PARTS OF DIGESTIVE TRACT: Chronic | ICD-10-CM

## 2024-09-09 DIAGNOSIS — Z98.891 HISTORY OF UTERINE SCAR FROM PREVIOUS SURGERY: Chronic | ICD-10-CM

## 2024-09-09 PROCEDURE — C1750: CPT

## 2024-09-09 PROCEDURE — C1769: CPT

## 2024-09-09 PROCEDURE — 77001 FLUOROGUIDE FOR VEIN DEVICE: CPT

## 2024-09-09 PROCEDURE — 76937 US GUIDE VASCULAR ACCESS: CPT | Mod: 26

## 2024-09-09 PROCEDURE — C1894: CPT

## 2024-09-09 PROCEDURE — 76937 US GUIDE VASCULAR ACCESS: CPT

## 2024-09-09 PROCEDURE — 36558 INSERT TUNNELED CV CATH: CPT

## 2024-09-09 PROCEDURE — 36558 INSERT TUNNELED CV CATH: CPT | Mod: RT

## 2024-09-09 PROCEDURE — 77001 FLUOROGUIDE FOR VEIN DEVICE: CPT | Mod: 26

## 2024-09-09 NOTE — ASU DISCHARGE PLAN (ADULT/PEDIATRIC) - NS MD DC FALL RISK RISK
For information on Fall & Injury Prevention, visit: [email or web address] OR  [email or web address] OR  https://www.cdc.gov/steadi/patient.html

## 2024-09-09 NOTE — H&P PST ADULT - HISTORY OF PRESENT ILLNESS
Interventional Radiology Pre-Procedure Note    Interventional Radiology Attending Physician: Dr. Toribio    Diagnosis/Indication: Patient is a 58y old  Female who presents with a chief complaint o  f   Procedure: Right 14F tunneled pharesis catheter placement    Allergies: penicillins (Rash)  erythromycin (Rash)    Medications (Abx/Cardiac/Anticoagulation/Blood Products)      Data:  157.5  59  T(C): 36.8  HR: 91  BP: 144/74  RR: 18  SpO2: 99%    Exam  General: No acute distress.   Chest: Non labored breathing.    -WBC 5.38 / HgB 13.3 / Hct 39.4 / Plt 333        Imaging: reviewed    Assessment/Plan: 58y Female presents for right tunneled pharesis catheter placement    Procedure and risks discussed with patient and he is agreeable to proceed. Risks/Benefits/alternatives explained with the patient and/or healthcare proxy and witnessed informed consent obtained.

## 2024-09-09 NOTE — PROCEDURE NOTE - PROCEDURE FINDINGS AND DETAILS
Successful placement of 14.5F right IJ tunneled pharesis catheter with tip in the distal SVC/RA junction. Full report to follow.

## 2024-09-09 NOTE — PACU DISCHARGE NOTE - NSCLINEINSERTRD_GEN_ALL_CORE
Requested Prescriptions     Pending Prescriptions Disp Refills    apixaban (Eliquis) 5 mg tablet [Pharmacy Med Name: ELIQUIS 5 MG TABLET] 60 tablet 11     Sig: Take 1 tablet (5 mg) by mouth 2 times a day.     Please send the attached prescription for the patient. They have a follow up scheduled. Thank you.     No

## 2024-09-09 NOTE — PRE-ANESTHESIA EVALUATION ADULT - NSANTHPEFT_GEN_ALL_CORE
Gen: alert and oriented x3, no distress  Lung: clear   CV: S1 S2   Abd: soft  Ext: No edema  neuro: CN 2-12 grossly intact, no gross motor or sensory deficits appreciated  Skin: normal

## 2024-09-09 NOTE — ASU DISCHARGE PLAN (ADULT/PEDIATRIC) - NURSING INSTRUCTIONS
Please feel free to contact us at [phone] if any questions or concerns arise. After 6PM on Monday through Friday (and weekends and holidays) please call [phone] and ask for the radiology resident on call to be paged..

## 2024-09-09 NOTE — ASU DISCHARGE PLAN (ADULT/PEDIATRIC) - ASU DC SPECIAL INSTRUCTIONSFT
Non-Tunneled Central Venous Catheter Placement    Discharge Instructions  - You have had a tunneled central venous catheter placed in your neck.  - The catheter is ready for use.  - You may shower as long as the catheter and dressing remains dry.  -  No soaking or swimming until the catheter is removed or when the site is completely healed.  - Keep the area covered and dry for as long as the catheter remains in. It may be removed and changed by a nurse as needed.  - Do not perform any heavy lifting or put tension on the area for the next week or until the site is healed.  - You may resume your normal diet.  - You may resume your normal medications however you should wait 48 hours before restarting aspirin, plavix, or blood thinners.  - It is normal to experience some pain over the site for the next few days. You may take apply ice to the area (20 minutes on, 20 minutes off) and take Tylenol for that pain. Do not take more frequently than every 6 hours and do not exceed more than 3000mg of Tylenol in a 24 hour period.    Notify your primary physician and/or Interventional Radiology IMMEDIATELY if you experience any of the following       - Fever of 101F or 38C       - Chills or Rigors/ Shakes       - Swelling and/or Redness in the area around the port       - Worsening Pain       - Blood soaked bandages or worsening bleeding       - Lightheadedness and/or dizziness upon standing       - Chest Pain/ Tightness       - Shortness of Breath       - Difficulty walking    If you have a problem that you believe requires IMMEDIATE attention, please go to your NEAREST Emergency Room. If you believe your problem can safely wait until you speak to a physician, please call Interventional Radiology for any concerns.      Please feel free to contact us at [phone] if any problems arise. After 6PM, Monday through Friday, on weekends and on holidays, please call [phone] and ask for the radiology resident on call to be paged.

## 2024-09-10 ENCOUNTER — OUTPATIENT (OUTPATIENT)
Dept: OUTPATIENT SERVICES | Facility: HOSPITAL | Age: 59
LOS: 1 days | End: 2024-09-10
Payer: COMMERCIAL

## 2024-09-10 ENCOUNTER — APPOINTMENT (OUTPATIENT)
Dept: INFUSION THERAPY | Facility: HOSPITAL | Age: 59
End: 2024-09-10

## 2024-09-10 DIAGNOSIS — C90.00 MULTIPLE MYELOMA NOT HAVING ACHIEVED REMISSION: ICD-10-CM

## 2024-09-10 DIAGNOSIS — Z01.818 ENCOUNTER FOR OTHER PREPROCEDURAL EXAMINATION: ICD-10-CM

## 2024-09-10 DIAGNOSIS — Z90.49 ACQUIRED ABSENCE OF OTHER SPECIFIED PARTS OF DIGESTIVE TRACT: Chronic | ICD-10-CM

## 2024-09-10 DIAGNOSIS — Z98.891 HISTORY OF UTERINE SCAR FROM PREVIOUS SURGERY: Chronic | ICD-10-CM

## 2024-09-10 LAB
ALBUMIN SERPL ELPH-MCNC: 4.1 G/DL — SIGNIFICANT CHANGE UP (ref 3.3–5)
ALP SERPL-CCNC: 246 U/L — HIGH (ref 40–120)
ALT FLD-CCNC: 20 U/L — SIGNIFICANT CHANGE UP (ref 10–45)
ANION GAP SERPL CALC-SCNC: 13 MMOL/L — SIGNIFICANT CHANGE UP (ref 5–17)
ANION GAP SERPL CALC-SCNC: 15 MMOL/L — SIGNIFICANT CHANGE UP (ref 5–17)
AST SERPL-CCNC: 20 U/L — SIGNIFICANT CHANGE UP (ref 10–40)
BILIRUB SERPL-MCNC: 0.7 MG/DL — SIGNIFICANT CHANGE UP (ref 0.2–1.2)
BUN SERPL-MCNC: 10 MG/DL — SIGNIFICANT CHANGE UP (ref 7–23)
BUN SERPL-MCNC: 16 MG/DL — SIGNIFICANT CHANGE UP (ref 7–23)
CA-I BLD-SCNC: 1.19 MMOL/L — SIGNIFICANT CHANGE UP (ref 1.15–1.33)
CALCIUM SERPL-MCNC: 11 MG/DL — HIGH (ref 8.4–10.5)
CALCIUM SERPL-MCNC: 9.3 MG/DL — SIGNIFICANT CHANGE UP (ref 8.4–10.5)
CD34 CELLS # FLD: 65 CELLS/UL — SIGNIFICANT CHANGE UP
CD34 VIABILITY: 96 % — SIGNIFICANT CHANGE UP
CHLORIDE SERPL-SCNC: 104 MMOL/L — SIGNIFICANT CHANGE UP (ref 96–108)
CHLORIDE SERPL-SCNC: 99 MMOL/L — SIGNIFICANT CHANGE UP (ref 96–108)
CO2 SERPL-SCNC: 24 MMOL/L — SIGNIFICANT CHANGE UP (ref 22–31)
CO2 SERPL-SCNC: 28 MMOL/L — SIGNIFICANT CHANGE UP (ref 22–31)
CREAT SERPL-MCNC: 0.86 MG/DL — SIGNIFICANT CHANGE UP (ref 0.5–1.3)
CREAT SERPL-MCNC: 1.18 MG/DL — SIGNIFICANT CHANGE UP (ref 0.5–1.3)
EGFR: 53 ML/MIN/1.73M2 — LOW
EGFR: 77 ML/MIN/1.73M2 — SIGNIFICANT CHANGE UP
EOSINOPHIL NFR BLD AUTO: 4 — SIGNIFICANT CHANGE UP
GLUCOSE SERPL-MCNC: 127 MG/DL — HIGH (ref 70–99)
GLUCOSE SERPL-MCNC: 96 MG/DL — SIGNIFICANT CHANGE UP (ref 70–99)
HCT VFR BLD CALC: 31.7 % — LOW (ref 34.5–45)
HCT VFR BLD CALC: 36.1 % — SIGNIFICANT CHANGE UP (ref 34.5–45)
HGB BLD-MCNC: 10.6 G/DL — LOW (ref 11.5–15.5)
HGB BLD-MCNC: 12.2 G/DL — SIGNIFICANT CHANGE UP (ref 11.5–15.5)
LYMPHOCYTES # BLD AUTO: 10 % — LOW (ref 13–44)
MAGNESIUM SERPL-MCNC: 1.6 MG/DL — SIGNIFICANT CHANGE UP (ref 1.6–2.6)
MAGNESIUM SERPL-MCNC: 2 MG/DL — SIGNIFICANT CHANGE UP (ref 1.6–2.6)
MCHC RBC-ENTMCNC: 32.2 PG — SIGNIFICANT CHANGE UP (ref 27–34)
MCHC RBC-ENTMCNC: 32.3 PG — SIGNIFICANT CHANGE UP (ref 27–34)
MCHC RBC-ENTMCNC: 33.4 GM/DL — SIGNIFICANT CHANGE UP (ref 32–36)
MCHC RBC-ENTMCNC: 33.8 GM/DL — SIGNIFICANT CHANGE UP (ref 32–36)
MCV RBC AUTO: 95.5 FL — SIGNIFICANT CHANGE UP (ref 80–100)
MCV RBC AUTO: 96.4 FL — SIGNIFICANT CHANGE UP (ref 80–100)
METAMYELOCYTES # FLD: 1 % — HIGH (ref 0–0)
MONOCYTES NFR BLD AUTO: 15 % — HIGH (ref 2–14)
MYELOCYTES NFR BLD: 1 % — HIGH (ref 0–0)
NEUTROPHILS NFR BLD AUTO: 56 % — SIGNIFICANT CHANGE UP (ref 43–77)
NEUTS BAND # BLD: 12 % — HIGH (ref 0–8)
NRBC # BLD: 0 /100 WBCS — SIGNIFICANT CHANGE UP (ref 0–0)
NRBC # BLD: 0 /100 WBCS — SIGNIFICANT CHANGE UP (ref 0–0)
PLAT MORPH BLD: NORMAL — SIGNIFICANT CHANGE UP
PLATELET # BLD AUTO: 114 K/UL — LOW (ref 150–400)
PLATELET # BLD AUTO: 236 K/UL — SIGNIFICANT CHANGE UP (ref 150–400)
POTASSIUM SERPL-MCNC: 3.3 MMOL/L — LOW (ref 3.5–5.3)
POTASSIUM SERPL-MCNC: 3.9 MMOL/L — SIGNIFICANT CHANGE UP (ref 3.5–5.3)
POTASSIUM SERPL-SCNC: 3.3 MMOL/L — LOW (ref 3.5–5.3)
POTASSIUM SERPL-SCNC: 3.9 MMOL/L — SIGNIFICANT CHANGE UP (ref 3.5–5.3)
PROT SERPL-MCNC: 6.3 G/DL — SIGNIFICANT CHANGE UP (ref 6–8.3)
RBC # BLD: 3.29 M/UL — LOW (ref 3.8–5.2)
RBC # BLD: 3.78 M/UL — LOW (ref 3.8–5.2)
RBC # FLD: 15.6 % — HIGH (ref 10.3–14.5)
RBC # FLD: 15.7 % — HIGH (ref 10.3–14.5)
RBC BLD AUTO: SIGNIFICANT CHANGE UP
SODIUM SERPL-SCNC: 141 MMOL/L — SIGNIFICANT CHANGE UP (ref 135–145)
SODIUM SERPL-SCNC: 142 MMOL/L — SIGNIFICANT CHANGE UP (ref 135–145)
VARIANT LYMPHS # BLD: 1 % — SIGNIFICANT CHANGE UP (ref 0–6)
WBC # BLD: 59.64 K/UL — CRITICAL HIGH (ref 3.8–10.5)
WBC # BLD: 85.12 K/UL — CRITICAL HIGH (ref 3.8–10.5)
WBC # FLD AUTO: 59.64 K/UL — CRITICAL HIGH (ref 3.8–10.5)
WBC # FLD AUTO: 85.12 K/UL — CRITICAL HIGH (ref 3.8–10.5)

## 2024-09-10 PROCEDURE — 85007 BL SMEAR W/DIFF WBC COUNT: CPT

## 2024-09-10 PROCEDURE — 86077 PHYS BLOOD BANK SERV XMATCH: CPT

## 2024-09-10 PROCEDURE — 86880 COOMBS TEST DIRECT: CPT

## 2024-09-10 PROCEDURE — 86901 BLOOD TYPING SEROLOGIC RH(D): CPT

## 2024-09-10 PROCEDURE — 38206 HARVEST AUTO STEM CELLS: CPT

## 2024-09-10 PROCEDURE — 86970 RBC PRETX INCUBATJ W/CHEMICL: CPT

## 2024-09-10 PROCEDURE — 86870 RBC ANTIBODY IDENTIFICATION: CPT

## 2024-09-10 PROCEDURE — 83735 ASSAY OF MAGNESIUM: CPT

## 2024-09-10 PROCEDURE — P9047: CPT

## 2024-09-10 PROCEDURE — 85027 COMPLETE CBC AUTOMATED: CPT

## 2024-09-10 PROCEDURE — 80053 COMPREHEN METABOLIC PANEL: CPT

## 2024-09-10 PROCEDURE — 86850 RBC ANTIBODY SCREEN: CPT

## 2024-09-10 PROCEDURE — 86900 BLOOD TYPING SEROLOGIC ABO: CPT

## 2024-09-10 PROCEDURE — 80048 BASIC METABOLIC PNL TOTAL CA: CPT

## 2024-09-10 PROCEDURE — 82330 ASSAY OF CALCIUM: CPT

## 2024-09-10 PROCEDURE — 86367 STEM CELLS TOTAL COUNT: CPT

## 2024-09-10 PROCEDURE — 86922 COMPATIBILITY TEST ANTIGLOB: CPT

## 2024-09-10 PROCEDURE — 86905 BLOOD TYPING RBC ANTIGENS: CPT

## 2024-09-10 RX ORDER — POTASSIUM CHLORIDE 1500 MG/1
20 TABLET, FILM COATED, EXTENDED RELEASE ORAL
Qty: 4 | Refills: 0 | Status: ACTIVE | COMMUNITY
Start: 2024-09-10 | End: 1900-01-01

## 2024-09-11 ENCOUNTER — NON-APPOINTMENT (OUTPATIENT)
Age: 59
End: 2024-09-11

## 2024-09-13 ENCOUNTER — APPOINTMENT (OUTPATIENT)
Age: 59
End: 2024-09-13

## 2024-09-16 ENCOUNTER — APPOINTMENT (OUTPATIENT)
Dept: HEMATOLOGY ONCOLOGY | Facility: CLINIC | Age: 59
End: 2024-09-16

## 2024-09-17 ENCOUNTER — RESULT REVIEW (OUTPATIENT)
Age: 59
End: 2024-09-17

## 2024-09-17 ENCOUNTER — APPOINTMENT (OUTPATIENT)
Dept: HEMATOLOGY ONCOLOGY | Facility: CLINIC | Age: 59
End: 2024-09-17

## 2024-09-17 ENCOUNTER — APPOINTMENT (OUTPATIENT)
Dept: INFUSION THERAPY | Facility: HOSPITAL | Age: 59
End: 2024-09-17

## 2024-09-18 ENCOUNTER — NON-APPOINTMENT (OUTPATIENT)
Age: 59
End: 2024-09-18

## 2024-09-18 ENCOUNTER — APPOINTMENT (OUTPATIENT)
Dept: ENDOCRINOLOGY | Facility: CLINIC | Age: 59
End: 2024-09-18
Payer: COMMERCIAL

## 2024-09-18 VITALS
SYSTOLIC BLOOD PRESSURE: 120 MMHG | OXYGEN SATURATION: 99 % | HEART RATE: 81 BPM | HEIGHT: 62 IN | DIASTOLIC BLOOD PRESSURE: 70 MMHG | WEIGHT: 131 LBS | BODY MASS INDEX: 24.11 KG/M2

## 2024-09-18 DIAGNOSIS — C90.00 MULTIPLE MYELOMA NOT HAVING ACHIEVED REMISSION: ICD-10-CM

## 2024-09-18 DIAGNOSIS — Z49.01 ENCOUNTER FOR FITTING AND ADJUSTMENT OF EXTRACORPOREAL DIALYSIS CATHETER: ICD-10-CM

## 2024-09-18 PROCEDURE — 99204 OFFICE O/P NEW MOD 45 MIN: CPT

## 2024-09-18 PROCEDURE — G2211 COMPLEX E/M VISIT ADD ON: CPT

## 2024-09-18 RX ORDER — ASPIRIN 81 MG
81 TABLET, DELAYED RELEASE (ENTERIC COATED) ORAL
Refills: 0 | Status: ACTIVE | COMMUNITY

## 2024-09-18 NOTE — PHYSICAL EXAM
[Alert] : alert [No Neck Mass] : no neck mass was observed [Thyroid Not Enlarged] : the thyroid was not enlarged [No Respiratory Distress] : no respiratory distress [No Accessory Muscle Use] : no accessory muscle use [Clear to Auscultation] : lungs were clear to auscultation bilaterally [Normal S1, S2] : normal S1 and S2

## 2024-09-18 NOTE — ASSESSMENT
[FreeTextEntry1] : 5/2024 Thyroid US  Right  RUP  1.1 x 1.1 x 0.9 cm Iso not biopsied, can consider biopsing if it is more than 1.5 cm  LM/LP 1.8 x 1.3 x 1.2 cm isoechoic. Biopsied. Molecular testing negative for 7 genes only  LMP 0.8 x 0.7 x 0.7 cm isoechoic No need for biopsy   Since the molecular testing was only limited to 7 genes, will plan to repeat the biopsy with thyroseq since it includes around more than 100 genes and will likely include RUP 1.1 cm. First will repeat the US thyroid to see if the size is the same   MM Going for stem cell transplant in 10/2024   I spent 40 minutes discussing with patient face to face and non-face to face reviewing documentations, labs, and/or imaging, also discussing the management plans.  RTC in 3-4 months for 20 min

## 2024-09-18 NOTE — HISTORY OF PRESENT ILLNESS
[FreeTextEntry1] : Here for thyroid nodules   Case history  history of DCIS found to have multiple myeloma and is currently undergoing treatment. As part of her evaluation she had a PET scan which showed hypermetabolic focus in the thyroid. Ultrasound was done which showed multiple nodules. Patient had aspiration of left lower pole 1.8 cm nodule which came back as category 3- atypia of undetermined significance. Genetic panel results were negative for 7 mutations.   5/2024 Thyroid US  Right  There is 1.1 x 1.1 x 0.9 cm heterogeneous predominantly isoechoic nodule in the upper pole.  Left  1.8 x 1.3 x 1.2 cm isoechoic nodule in the mid to lower pole (TIRAD-3). This corresponds to the FDG avid left thyroid nodule 0.8 x 0.7 x 0.7 cm isoechoic nodule in the midpole (TIRAD-3).

## 2024-09-20 ENCOUNTER — APPOINTMENT (OUTPATIENT)
Dept: ULTRASOUND IMAGING | Facility: CLINIC | Age: 59
End: 2024-09-20
Payer: COMMERCIAL

## 2024-09-20 ENCOUNTER — OUTPATIENT (OUTPATIENT)
Dept: OUTPATIENT SERVICES | Facility: HOSPITAL | Age: 59
LOS: 1 days | Discharge: HOME | End: 2024-09-20

## 2024-09-20 ENCOUNTER — APPOINTMENT (OUTPATIENT)
Dept: HEMATOLOGY ONCOLOGY | Facility: CLINIC | Age: 59
End: 2024-09-20

## 2024-09-20 ENCOUNTER — OUTPATIENT (OUTPATIENT)
Dept: OUTPATIENT SERVICES | Facility: HOSPITAL | Age: 59
LOS: 1 days | End: 2024-09-20

## 2024-09-20 ENCOUNTER — NON-APPOINTMENT (OUTPATIENT)
Age: 59
End: 2024-09-20

## 2024-09-20 ENCOUNTER — RESULT REVIEW (OUTPATIENT)
Age: 59
End: 2024-09-20

## 2024-09-20 DIAGNOSIS — C90.00 MULTIPLE MYELOMA NOT HAVING ACHIEVED REMISSION: ICD-10-CM

## 2024-09-20 DIAGNOSIS — E04.1 NONTOXIC SINGLE THYROID NODULE: ICD-10-CM

## 2024-09-20 DIAGNOSIS — Z90.49 ACQUIRED ABSENCE OF OTHER SPECIFIED PARTS OF DIGESTIVE TRACT: Chronic | ICD-10-CM

## 2024-09-20 DIAGNOSIS — Z98.891 HISTORY OF UTERINE SCAR FROM PREVIOUS SURGERY: Chronic | ICD-10-CM

## 2024-09-20 LAB
BASOPHILS # BLD AUTO: 0.1 K/UL — SIGNIFICANT CHANGE UP (ref 0–0.2)
BASOPHILS NFR BLD AUTO: 1.4 % — SIGNIFICANT CHANGE UP (ref 0–2)
EOSINOPHIL # BLD AUTO: 0.2 K/UL — SIGNIFICANT CHANGE UP (ref 0–0.5)
EOSINOPHIL NFR BLD AUTO: 4.2 % — SIGNIFICANT CHANGE UP (ref 0–6)
HCT VFR BLD CALC: 32.7 % — LOW (ref 34.5–45)
HGB BLD-MCNC: 10.6 G/DL — LOW (ref 11.5–15.5)
LYMPHOCYTES # BLD AUTO: 0.8 K/UL — LOW (ref 1–3.3)
LYMPHOCYTES # BLD AUTO: 16.3 % — SIGNIFICANT CHANGE UP (ref 13–44)
MCHC RBC-ENTMCNC: 31.9 PG — SIGNIFICANT CHANGE UP (ref 27–34)
MCHC RBC-ENTMCNC: 32.4 G/DL — SIGNIFICANT CHANGE UP (ref 32–36)
MCV RBC AUTO: 98.3 FL — SIGNIFICANT CHANGE UP (ref 80–100)
MONOCYTES # BLD AUTO: 0.2 K/UL — SIGNIFICANT CHANGE UP (ref 0–0.9)
MONOCYTES NFR BLD AUTO: 4.7 % — SIGNIFICANT CHANGE UP (ref 2–14)
NEUTROPHILS # BLD AUTO: 3.6 K/UL — SIGNIFICANT CHANGE UP (ref 1.8–7.4)
NEUTROPHILS NFR BLD AUTO: 73.3 % — SIGNIFICANT CHANGE UP (ref 43–77)
PLATELET # BLD AUTO: 348 K/UL — SIGNIFICANT CHANGE UP (ref 150–400)
RBC # BLD: 3.32 M/UL — LOW (ref 3.8–5.2)
RBC # FLD: 14.2 % — SIGNIFICANT CHANGE UP (ref 10.3–14.5)
WBC # BLD: 4.9 K/UL — SIGNIFICANT CHANGE UP (ref 3.8–10.5)
WBC # FLD AUTO: 4.9 K/UL — SIGNIFICANT CHANGE UP (ref 3.8–10.5)

## 2024-09-20 PROCEDURE — 76536 US EXAM OF HEAD AND NECK: CPT | Mod: 26

## 2024-09-20 PROCEDURE — 76882 US LMTD JT/FCL EVL NVASC XTR: CPT | Mod: 26,LT

## 2024-09-23 LAB — TSH SERPL-ACNC: 2.02 UIU/ML

## 2024-09-24 ENCOUNTER — APPOINTMENT (OUTPATIENT)
Dept: INFUSION THERAPY | Facility: HOSPITAL | Age: 59
End: 2024-09-24

## 2024-09-24 ENCOUNTER — APPOINTMENT (OUTPATIENT)
Dept: HEMATOLOGY ONCOLOGY | Facility: CLINIC | Age: 59
End: 2024-09-24
Payer: COMMERCIAL

## 2024-09-24 ENCOUNTER — NON-APPOINTMENT (OUTPATIENT)
Age: 59
End: 2024-09-24

## 2024-09-24 VITALS
WEIGHT: 131.17 LBS | DIASTOLIC BLOOD PRESSURE: 86 MMHG | OXYGEN SATURATION: 99 % | TEMPERATURE: 98.7 F | RESPIRATION RATE: 16 BRPM | SYSTOLIC BLOOD PRESSURE: 145 MMHG | HEART RATE: 83 BPM | BODY MASS INDEX: 23.99 KG/M2

## 2024-09-24 DIAGNOSIS — Z01.818 ENCOUNTER FOR OTHER PREPROCEDURAL EXAMINATION: ICD-10-CM

## 2024-09-24 DIAGNOSIS — C90.00 MULTIPLE MYELOMA NOT HAVING ACHIEVED REMISSION: ICD-10-CM

## 2024-09-24 PROCEDURE — G2211 COMPLEX E/M VISIT ADD ON: CPT

## 2024-09-24 PROCEDURE — 99215 OFFICE O/P EST HI 40 MIN: CPT

## 2024-09-25 ENCOUNTER — NON-APPOINTMENT (OUTPATIENT)
Age: 59
End: 2024-09-25

## 2024-09-29 NOTE — ASSESSMENT
[FreeTextEntry1] :  Patient is a 57 yo F, diagnosed with IgA lambda MM  Asymptomatic multiple myeloma, ISS stage I,  R-ISS stage I,  Disease Status: CR with persistent RB1 del MRD: Clonoseq with 188 residual sequences (8/16/24)   Patient's history, pathology, imaging, and plan was reviewed. We re-discussed the auto-transplant process as well as its risks and benefits. All questions were answered.  Patient has completed precollection work up.  We went over the chemotherapy risks and side effects.   Given concerns for thyroid nodule, discussed with her endocrinologist recent thyroid US. Overall decrease in size of the nodules and features not concerning for carcinoma. Eventual repeat of the biopsy for further gene testing can be done post auto transplant per endocrinologist.   Plan to proceed with admission on 10/3.   Follow-up: post discharge from transplant.   Willow Palacios MD  [facility] Adult Transplantation and Cellular Therapy Program  Over 45 minutes was spent in reviewing labs, counseling patient and coordinating care.

## 2024-09-29 NOTE — PHYSICAL EXAM
[Fully active, able to carry on all pre-disease performance without restriction] : Status 0 - Fully active, able to carry on all pre-disease performance without restriction [Normal] : affect appropriate [de-identified] : on room air,

## 2024-09-29 NOTE — PHYSICAL EXAM
[Fully active, able to carry on all pre-disease performance without restriction] : Status 0 - Fully active, able to carry on all pre-disease performance without restriction [Normal] : affect appropriate [de-identified] : on room air,

## 2024-09-29 NOTE — ASSESSMENT
[FreeTextEntry1] :  Patient is a 57 yo F, diagnosed with IgA lambda MM  Asymptomatic multiple myeloma, ISS stage I,  R-ISS stage I,  Disease Status: CR with persistent RB1 del MRD: Clonoseq with 188 residual sequences (8/16/24)   Patient's history, pathology, imaging, and plan was reviewed. We re-discussed the auto-transplant process as well as its risks and benefits. All questions were answered.  Patient has completed precollection work up.  We went over the chemotherapy risks and side effects.   Given concerns for thyroid nodule, discussed with her endocrinologist recent thyroid US. Overall decrease in size of the nodules and features not concerning for carcinoma. Eventual repeat of the biopsy for further gene testing can be done post auto transplant per endocrinologist.   Plan to proceed with admission on 10/3.   Follow-up: post discharge from transplant.   Willow Palacios MD  [facility] Adult Transplantation and Cellular Therapy Program  Over 45 minutes was spent in reviewing labs, counseling patient and coordinating care.

## 2024-09-29 NOTE — HISTORY OF PRESENT ILLNESS
[de-identified] :   Patient is a 58F , with IgA lambda Multiple myeloma here for follow up on auto-HSCT.    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 She reports the collection went well with no significant side effects. She states that she had a follow up with her endocrinologist for a thyroid nodule and was recommended a repeat biopsy.    Oncologic History:  The patient was initially found to have anemia on routine annual evaluation with her primary care physician. Further testing found an elevated protein gap (TProtein 9.2, albumin 4.3). No renal dysfunction or hypercalcemia. Paraprotein evaluation was notable for 2 IgA lambda bands (M-spike 1: 3.1, M-spike 2: 0.2). She had normal iron, B12, and thyroid studies. No proteinuria. She established care with a hematologist and baseline labs were consistent with Multiple myeloma.    Imaging ---- Pre-treatment PET/CT on 3/15/24 did not show any lytic lesions. There was hypermetabolic uptake within the thyroid gland, for which sonography is suggested to exclude focal hypermetabolic nodules  Pathology:  ---Bone marrow biopsy: 8/16/24: Cellular marrow (40-50%) with maturing trilineage hematopoiesis with no increase in plasma cell population          FISH: persistent deletion of RB1 and FGFR3  Treatment ---Jolene-VRd C1D1 3/25/24; now s/p 5 cycles, C5D1 on 7/26/24  ---Radiation: no    Family History: - Cousin - breast cancer (passed away in her 60's) - Sister - T2DM, hypothyroidism, glaucoma - Father - MI - Mother - Parkisonism  Social: -The patient was born in , parents from  and currently lives in [place]. - Patient lives with her significant other, has 2 children, (37G, 24G - healthy) - Currently works as a dentist  -  Non-smoker, social alcohol use, no other drug use

## 2024-09-29 NOTE — HISTORY OF PRESENT ILLNESS
[de-identified] :   Patient is a 58F , with IgA lambda Multiple myeloma here for follow up on auto-HSCT.    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 She reports the collection went well with no significant side effects. She states that she had a follow up with her endocrinologist for a thyroid nodule and was recommended a repeat biopsy.    Oncologic History:  The patient was initially found to have anemia on routine annual evaluation with her primary care physician. Further testing found an elevated protein gap (TProtein 9.2, albumin 4.3). No renal dysfunction or hypercalcemia. Paraprotein evaluation was notable for 2 IgA lambda bands (M-spike 1: 3.1, M-spike 2: 0.2). She had normal iron, B12, and thyroid studies. No proteinuria. She established care with a hematologist and baseline labs were consistent with Multiple myeloma.    Imaging ---- Pre-treatment PET/CT on 3/15/24 did not show any lytic lesions. There was hypermetabolic uptake within the thyroid gland, for which sonography is suggested to exclude focal hypermetabolic nodules  Pathology:  ---Bone marrow biopsy: 8/16/24: Cellular marrow (40-50%) with maturing trilineage hematopoiesis with no increase in plasma cell population          FISH: persistent deletion of RB1 and FGFR3  Treatment ---Jolene-VRd C1D1 3/25/24; now s/p 5 cycles, C5D1 on 7/26/24  ---Radiation: no    Family History: - Cousin - breast cancer (passed away in her 60's) - Sister - T2DM, hypothyroidism, glaucoma - Father - MI - Mother - Parkisonism  Social: -The patient was born in , parents from  and currently lives in [place]. - Patient lives with her significant other, has 2 children, (37G, 24G - healthy) - Currently works as a dentist  -  Non-smoker, social alcohol use, no other drug use

## 2024-10-01 ENCOUNTER — APPOINTMENT (OUTPATIENT)
Dept: INFUSION THERAPY | Facility: HOSPITAL | Age: 59
End: 2024-10-01

## 2024-10-02 RX ORDER — SODIUM BICARBONATE 650 MG
10 TABLET ORAL
Refills: 0 | Status: DISCONTINUED | OUTPATIENT
Start: 2024-10-03 | End: 2024-10-16

## 2024-10-02 RX ORDER — ACETAMINOPHEN 325 MG
650 TABLET ORAL EVERY 6 HOURS
Refills: 0 | Status: DISCONTINUED | OUTPATIENT
Start: 2024-10-03 | End: 2024-10-16

## 2024-10-02 RX ORDER — E-LYTES/CARBOXYMETHYLCELLULOSE
5 AEROSOL, SPRAY (ML) MUCOUS MEMBRANE
Refills: 0 | Status: DISCONTINUED | OUTPATIENT
Start: 2024-10-03 | End: 2024-10-16

## 2024-10-02 NOTE — H&P ADULT - REASON FOR ADMISSION
Autologous peripheral blood stem cell transplant with high dose melphalan (200mg/m2) prep regimen for treatment of multiple myeloma

## 2024-10-02 NOTE — H&P ADULT - HISTORY OF PRESENT ILLNESS
This is a 58 year old female with a medical history of ductal carcinoma in situ (2019, status post bilateral mastectomy) and recently diagnosed multiple myeloma (2024) admitted for an autologous pbsct with melphalan prep (200mg/m2). Hematologic history as follows:   found to be anemic on routine PE, further testing showed an elevated protein gap; evaluation notable for 2 IgA lambda bands. A pre treatment PET CT showed hypermetabolic uptake in the thyroid gland. She follows with endocrine. There has been an overall decrease in the size of the thyroid nodules and the features were not concerning for carcinoma. She was treated with 5 cycles of Jolene-VRd (C1D1 3/25/24).  A bone marrow biopsy 8/16/24 showed cellular marrow (40-50%) with no increase in plasma cell population. Clonoseq + MRD. Immunofixation 9/17/24 identified an IgG kappa band.

## 2024-10-02 NOTE — H&P ADULT - NS ATTEND AMEND GEN_ALL_CORE FT
58 year old female with recently diagnosed multiple myeloma (3/2024) treated with 5 cycles of Jolene-VRd, admitted for an autologous pbsct with melphalan (200mg/m2) prep regimen  1. Admit to BMTU on day -1    2. TLC placement in IR    3. Melphalan hydration: start NS at 250ml / hr for 2 hours before and 2 hours post melphalan infusion    4. On day -1;  melphalan 200mg/m2 IV once administered over 30 minutes; initiate cryotherapy (one tbsp of ice in the mouth constantly) 15 minutes before, during and following the melphalan infusion    5. Stem cell infusion on day 0    6. Start zarxio 5mcg/kg/day daily on day + 5.  1. Antiviral prophylaxis with acyclovir 800 po BID to start on admission    2. Fluconazole to start when ANC < 500 and continue through engraftment    3. Levaquin to start when ANC < 1000   4. Bactrim SS for PCP prophylaxis post engraftment    5. CMV PCR weekly post engraftment through day + 100  6. transfuse as per guidelines  7. follow i/o, replete lytes prn  8. mouth and skin care

## 2024-10-02 NOTE — H&P ADULT - ASSESSMENT
58 year old female with recently diagnosed multiple myeloma (3/2024) treated with 5 cycles of Jolene-VRd, admitted for an autologous pbsct with melphalan (200mg/m2) prep regimen

## 2024-10-03 ENCOUNTER — INPATIENT (INPATIENT)
Facility: HOSPITAL | Age: 59
LOS: 12 days | Discharge: HOME | DRG: 842 | End: 2024-10-16
Attending: STUDENT IN AN ORGANIZED HEALTH CARE EDUCATION/TRAINING PROGRAM | Admitting: STUDENT IN AN ORGANIZED HEALTH CARE EDUCATION/TRAINING PROGRAM
Payer: COMMERCIAL

## 2024-10-03 VITALS
WEIGHT: 129.85 LBS | HEART RATE: 75 BPM | TEMPERATURE: 98 F | OXYGEN SATURATION: 98 % | HEIGHT: 61.42 IN | DIASTOLIC BLOOD PRESSURE: 80 MMHG | RESPIRATION RATE: 18 BRPM | SYSTOLIC BLOOD PRESSURE: 136 MMHG

## 2024-10-03 DIAGNOSIS — C90.00 MULTIPLE MYELOMA NOT HAVING ACHIEVED REMISSION: ICD-10-CM

## 2024-10-03 DIAGNOSIS — Z98.891 HISTORY OF UTERINE SCAR FROM PREVIOUS SURGERY: Chronic | ICD-10-CM

## 2024-10-03 DIAGNOSIS — Z94.84 STEM CELLS TRANSPLANT STATUS: ICD-10-CM

## 2024-10-03 DIAGNOSIS — Z29.9 ENCOUNTER FOR PROPHYLACTIC MEASURES, UNSPECIFIED: ICD-10-CM

## 2024-10-03 DIAGNOSIS — Z90.49 ACQUIRED ABSENCE OF OTHER SPECIFIED PARTS OF DIGESTIVE TRACT: Chronic | ICD-10-CM

## 2024-10-03 LAB
ALBUMIN SERPL ELPH-MCNC: 4.5 G/DL — SIGNIFICANT CHANGE UP (ref 3.3–5)
ALP SERPL-CCNC: 81 U/L — SIGNIFICANT CHANGE UP (ref 40–120)
ALT FLD-CCNC: 22 U/L — SIGNIFICANT CHANGE UP (ref 10–45)
ANION GAP SERPL CALC-SCNC: 12 MMOL/L — SIGNIFICANT CHANGE UP (ref 5–17)
APPEARANCE UR: CLEAR — SIGNIFICANT CHANGE UP
APTT BLD: 30.7 SEC — SIGNIFICANT CHANGE UP (ref 24.5–35.6)
AST SERPL-CCNC: 17 U/L — SIGNIFICANT CHANGE UP (ref 10–40)
BASOPHILS # BLD AUTO: 0.05 K/UL — SIGNIFICANT CHANGE UP (ref 0–0.2)
BASOPHILS NFR BLD AUTO: 1 % — SIGNIFICANT CHANGE UP (ref 0–2)
BILIRUB DIRECT SERPL-MCNC: 0.2 MG/DL — SIGNIFICANT CHANGE UP (ref 0–0.3)
BILIRUB INDIRECT FLD-MCNC: 0.6 MG/DL — SIGNIFICANT CHANGE UP (ref 0.2–1)
BILIRUB SERPL-MCNC: 0.8 MG/DL — SIGNIFICANT CHANGE UP (ref 0.2–1.2)
BILIRUB UR-MCNC: NEGATIVE — SIGNIFICANT CHANGE UP
BUN SERPL-MCNC: 15 MG/DL — SIGNIFICANT CHANGE UP (ref 7–23)
CALCIUM SERPL-MCNC: 9.5 MG/DL — SIGNIFICANT CHANGE UP (ref 8.4–10.5)
CHLORIDE SERPL-SCNC: 104 MMOL/L — SIGNIFICANT CHANGE UP (ref 96–108)
CO2 SERPL-SCNC: 25 MMOL/L — SIGNIFICANT CHANGE UP (ref 22–31)
COLOR SPEC: YELLOW — SIGNIFICANT CHANGE UP
CREAT SERPL-MCNC: 0.75 MG/DL — SIGNIFICANT CHANGE UP (ref 0.5–1.3)
DIFF PNL FLD: NEGATIVE — SIGNIFICANT CHANGE UP
EGFR: 91 ML/MIN/1.73M2 — SIGNIFICANT CHANGE UP
EOSINOPHIL # BLD AUTO: 0.16 K/UL — SIGNIFICANT CHANGE UP (ref 0–0.5)
EOSINOPHIL NFR BLD AUTO: 3.1 % — SIGNIFICANT CHANGE UP (ref 0–6)
GLUCOSE SERPL-MCNC: 89 MG/DL — SIGNIFICANT CHANGE UP (ref 70–99)
GLUCOSE UR QL: NEGATIVE MG/DL — SIGNIFICANT CHANGE UP
HCT VFR BLD CALC: 35.9 % — SIGNIFICANT CHANGE UP (ref 34.5–45)
HGB BLD-MCNC: 11.6 G/DL — SIGNIFICANT CHANGE UP (ref 11.5–15.5)
IMM GRANULOCYTES NFR BLD AUTO: 0.2 % — SIGNIFICANT CHANGE UP (ref 0–0.9)
INR BLD: 0.97 RATIO — SIGNIFICANT CHANGE UP (ref 0.85–1.16)
KETONES UR-MCNC: NEGATIVE MG/DL — SIGNIFICANT CHANGE UP
LDH SERPL L TO P-CCNC: 158 U/L — SIGNIFICANT CHANGE UP (ref 50–242)
LEUKOCYTE ESTERASE UR-ACNC: NEGATIVE — SIGNIFICANT CHANGE UP
LYMPHOCYTES # BLD AUTO: 0.83 K/UL — LOW (ref 1–3.3)
LYMPHOCYTES # BLD AUTO: 16.2 % — SIGNIFICANT CHANGE UP (ref 13–44)
MAGNESIUM SERPL-MCNC: 2.4 MG/DL — SIGNIFICANT CHANGE UP (ref 1.6–2.6)
MCHC RBC-ENTMCNC: 31.7 PG — SIGNIFICANT CHANGE UP (ref 27–34)
MCHC RBC-ENTMCNC: 32.3 GM/DL — SIGNIFICANT CHANGE UP (ref 32–36)
MCV RBC AUTO: 98.1 FL — SIGNIFICANT CHANGE UP (ref 80–100)
MONOCYTES # BLD AUTO: 0.24 K/UL — SIGNIFICANT CHANGE UP (ref 0–0.9)
MONOCYTES NFR BLD AUTO: 4.7 % — SIGNIFICANT CHANGE UP (ref 2–14)
NEUTROPHILS # BLD AUTO: 3.83 K/UL — SIGNIFICANT CHANGE UP (ref 1.8–7.4)
NEUTROPHILS NFR BLD AUTO: 74.8 % — SIGNIFICANT CHANGE UP (ref 43–77)
NITRITE UR-MCNC: NEGATIVE — SIGNIFICANT CHANGE UP
NRBC # BLD: 0 /100 WBCS — SIGNIFICANT CHANGE UP (ref 0–0)
PH UR: 7 — SIGNIFICANT CHANGE UP (ref 5–8)
PHOSPHATE SERPL-MCNC: 3.5 MG/DL — SIGNIFICANT CHANGE UP (ref 2.5–4.5)
PLATELET # BLD AUTO: 299 K/UL — SIGNIFICANT CHANGE UP (ref 150–400)
POTASSIUM SERPL-MCNC: 4.2 MMOL/L — SIGNIFICANT CHANGE UP (ref 3.5–5.3)
POTASSIUM SERPL-SCNC: 4.2 MMOL/L — SIGNIFICANT CHANGE UP (ref 3.5–5.3)
PROT SERPL-MCNC: 6.8 G/DL — SIGNIFICANT CHANGE UP (ref 6–8.3)
PROT UR-MCNC: NEGATIVE MG/DL — SIGNIFICANT CHANGE UP
PROTHROM AB SERPL-ACNC: 11.2 SEC — SIGNIFICANT CHANGE UP (ref 9.9–13.4)
RBC # BLD: 3.66 M/UL — LOW (ref 3.8–5.2)
RBC # BLD: 3.66 M/UL — LOW (ref 3.8–5.2)
RBC # FLD: 15.2 % — HIGH (ref 10.3–14.5)
RETICS #: 62.6 K/UL — SIGNIFICANT CHANGE UP (ref 25–125)
RETICS/RBC NFR: 1.7 % — SIGNIFICANT CHANGE UP (ref 0.5–2.5)
SODIUM SERPL-SCNC: 141 MMOL/L — SIGNIFICANT CHANGE UP (ref 135–145)
SP GR SPEC: <1.005 — LOW (ref 1–1.03)
UROBILINOGEN FLD QL: 0.2 MG/DL — SIGNIFICANT CHANGE UP (ref 0.2–1)
WBC # BLD: 5.12 K/UL — SIGNIFICANT CHANGE UP (ref 3.8–10.5)
WBC # FLD AUTO: 5.12 K/UL — SIGNIFICANT CHANGE UP (ref 3.8–10.5)

## 2024-10-03 PROCEDURE — 99223 1ST HOSP IP/OBS HIGH 75: CPT

## 2024-10-03 PROCEDURE — 93010 ELECTROCARDIOGRAM REPORT: CPT

## 2024-10-03 RX ORDER — ONDANSETRON HCL/PF 4 MG/2 ML
16 VIAL (ML) INJECTION ONCE
Refills: 0 | Status: COMPLETED | OUTPATIENT
Start: 2024-10-03 | End: 2024-10-03

## 2024-10-03 RX ORDER — CHLORHEXIDINE GLUCONATE ORAL RINSE 1.2 MG/ML
1 SOLUTION DENTAL
Refills: 0 | Status: DISCONTINUED | OUTPATIENT
Start: 2024-10-03 | End: 2024-10-09

## 2024-10-03 RX ORDER — SODIUM CHLORIDE 0.9 % (FLUSH) 0.9 %
500 SYRINGE (ML) INJECTION
Refills: 0 | Status: COMPLETED | OUTPATIENT
Start: 2024-10-03 | End: 2024-10-03

## 2024-10-03 RX ORDER — SODIUM CHLORIDE 0.9 % (FLUSH) 0.9 %
10 SYRINGE (ML) INJECTION
Refills: 0 | Status: DISCONTINUED | OUTPATIENT
Start: 2024-10-03 | End: 2024-10-16

## 2024-10-03 RX ORDER — PHYTONADIONE (VIT K1)
5 CRYSTALS MISCELLANEOUS
Refills: 0 | Status: DISCONTINUED | OUTPATIENT
Start: 2024-10-03 | End: 2024-10-16

## 2024-10-03 RX ORDER — CHLORHEXIDINE GLUCONATE ORAL RINSE 1.2 MG/ML
1 SOLUTION DENTAL
Refills: 0 | Status: DISCONTINUED | OUTPATIENT
Start: 2024-10-04 | End: 2024-10-16

## 2024-10-03 RX ORDER — MELPHALAN HYDROCHLORIDE 50 MG
158 KIT INTRAVENOUS ONCE
Refills: 0 | Status: COMPLETED | OUTPATIENT
Start: 2024-10-03 | End: 2024-10-03

## 2024-10-03 RX ORDER — ONDANSETRON HCL/PF 4 MG/2 ML
8 VIAL (ML) INJECTION ONCE
Refills: 0 | Status: COMPLETED | OUTPATIENT
Start: 2024-10-03 | End: 2024-10-03

## 2024-10-03 RX ORDER — SODIUM CHLORIDE 0.9 % (FLUSH) 0.9 %
500 SYRINGE (ML) INJECTION
Refills: 0 | Status: DISCONTINUED | OUTPATIENT
Start: 2024-10-03 | End: 2024-10-10

## 2024-10-03 RX ORDER — FOSAPREPITANT 150 MG/5ML
150 INJECTION, POWDER, LYOPHILIZED, FOR SOLUTION INTRAVENOUS ONCE
Refills: 0 | Status: COMPLETED | OUTPATIENT
Start: 2024-10-03 | End: 2024-10-03

## 2024-10-03 RX ADMIN — Medication 250 MILLILITER(S): at 18:30

## 2024-10-03 RX ADMIN — Medication 8 MILLIGRAM(S): at 23:10

## 2024-10-03 RX ADMIN — Medication 250 MILLILITER(S): at 11:00

## 2024-10-03 RX ADMIN — Medication 10 MILLILITER(S): at 18:49

## 2024-10-03 RX ADMIN — Medication 800 MILLIGRAM(S): at 18:43

## 2024-10-03 RX ADMIN — Medication 10 MILLILITER(S): at 20:48

## 2024-10-03 RX ADMIN — Medication 100 MILLIGRAM(S): at 14:05

## 2024-10-03 RX ADMIN — MELPHALAN HYDROCHLORIDE 158 MILLIGRAM(S): KIT INTRAVENOUS at 15:45

## 2024-10-03 RX ADMIN — MELPHALAN HYDROCHLORIDE 158 MILLIGRAM(S): KIT INTRAVENOUS at 15:05

## 2024-10-03 RX ADMIN — Medication 5 MILLIGRAM(S): at 18:43

## 2024-10-03 RX ADMIN — FOSAPREPITANT 300 MILLIGRAM(S): 150 INJECTION, POWDER, LYOPHILIZED, FOR SOLUTION INTRAVENOUS at 14:05

## 2024-10-03 RX ADMIN — Medication 5 MILLILITER(S): at 18:49

## 2024-10-03 RX ADMIN — Medication 5 MILLILITER(S): at 20:48

## 2024-10-03 NOTE — DIETITIAN INITIAL EVALUATION ADULT - PERTINENT MEDS FT
MEDICATIONS  (STANDING):  acetaminophen     Tablet .. 650 milliGRAM(s) Oral every 6 hours  acyclovir   Oral Tab/Cap 800 milliGRAM(s) Oral every 12 hours  Biotene Dry Mouth Oral Rinse 5 milliLiter(s) Swish and Spit five times a day  sodium bicarbonate Mouth Rinse 10 milliLiter(s) Swish and Spit five times a day    MEDICATIONS  (PRN):  acetaminophen     Tablet .. 650 milliGRAM(s) Oral every 6 hours PRN Temp greater or equal to 38C (100.4F), Mild Pain (1 - 3)

## 2024-10-03 NOTE — ADVANCED PRACTICE NURSE CONSULT - ASSESSMENT
Pt successfully admitted to [facility] for SCT. Pt alert and o x 4. VSS, afebrile.  Labs reviewed by DR Stevens on rounds.  Pt admitted with Right double lumen tunneled catheter patent, positive blood return obtained and flushing well. Site intact, No s/s of bleeding, swelling or redness. Pt education provided, well understood. Pt received Emend 150mg IV and  dexamethasone 10 mg Iv prior to chemo. Ice chips given. 2 certified  RN verification done. Day -2 melphalan 100 mg/m2= 148 mg Iv   started at 15:30 , infusing over 30 min  via Right TLC  via alaris pump. pt tolerating well. Safety maintained  Pt successfully admitted to [facility] for SCT. Pt alert and o x 4. VSS, afebrile.  Labs reviewed by DR Stevens on rounds.  Pt admitted with Right double lumen tunneled catheter patent, positive blood return obtained and flushing well. Site intact, No s/s of bleeding, swelling or redness. Pt education provided, well understood. Pt received Emend 150mg IV and  dexamethasone 10 mg Iv prior to chemo. pt refused Zofran prior to chemo due to hx of constipation due to zofran, NP natividad clark aware. no new orders at this time. Pre hydration NS @ 250ml/hr started at 1100 and completed 2hr post infusion @ 1830. Ice chips given and eaten by pt 15min prior to infusion, throughout infusion, and until 15 min post completion of infusion. 2 certified  RN verification done. Day -1 melphalan 200 mg/m2= 316 mg Iv split into 2 bags due to high volume. Bag 1/2 started at 1505 , infusing over 30 min  via Right Double lumen tunneled catheter via alaris pump, pt tolerated well. Bag 2/2 started at 1545, infusing over 30 min via R DL catheter. pt tolerating well. Safety maintained.

## 2024-10-03 NOTE — DIETITIAN INITIAL EVALUATION ADULT - ADD RECOMMEND
1. Continue current diet order: regular diet   2. Pt is not amenable to receiving oral nutritional supplements.   3. Monitor and encourage PO intake. Encourage use of daily menus. Honor dietary preferences as expressed as able.

## 2024-10-03 NOTE — DIETITIAN INITIAL EVALUATION ADULT - PHYSCIAL ASSESSMENT
Weight Hx Per:  - Source: patient   - UBW: 130 pounds   - Reported weight changes: none     Weight Hx Per [facility] HIE: no available weights to assess.     Current Admission Weights:  - Dosing weight: 129.14 pounds/58.9 kg (10/03)    Weight Change:  - no changes noted     **  Will continue to monitor weight trends as available/able.     IBW: 105 pounds   %IBW: 123%

## 2024-10-03 NOTE — DIETITIAN INITIAL EVALUATION ADULT - ETIOLOGY
in setting of increased physiological demand for nutrients  decreased ability to consume adequate protein-energy in setting of increased physiological demand for nutrients

## 2024-10-03 NOTE — DIETITIAN INITIAL EVALUATION ADULT - REASON INDICATOR FOR ASSESSMENT
Nutrition consult warranted for: new BMT admission   Information obtained from: electronic medical record and patient with family at bedside.   Chart reviewed, events noted.

## 2024-10-03 NOTE — DIETITIAN INITIAL EVALUATION ADULT - PERSON TAUGHT/METHOD
Discussed food safety and transplant education. Emphasized safe food handling, disposing of food after 24 hours, avoiding raw and fresh berries and using only individually wrapped dressings and condiments. Discussed importance of adequate consumption of meals/supplements to optimize protein-energy intake. Encouraged small/frequent meals, nutrient dense snacks, prioritizing protein foods at meal time. Pt made aware RD remains available to answer further questions./verbal instruction/patient instructed

## 2024-10-03 NOTE — DIETITIAN INITIAL EVALUATION ADULT - NSFNSGIIOFT_GEN_A_CORE
- Pt denies nausea, vomiting, diarrhea, or constipation.   - Last BM: this morning; not currently ordered for bowel regimen

## 2024-10-04 LAB
ALBUMIN SERPL ELPH-MCNC: 4.3 G/DL — SIGNIFICANT CHANGE UP (ref 3.3–5)
ALP SERPL-CCNC: 81 U/L — SIGNIFICANT CHANGE UP (ref 40–120)
ALT FLD-CCNC: 21 U/L — SIGNIFICANT CHANGE UP (ref 10–45)
ANION GAP SERPL CALC-SCNC: 14 MMOL/L — SIGNIFICANT CHANGE UP (ref 5–17)
ANISOCYTOSIS BLD QL: SIGNIFICANT CHANGE UP
AST SERPL-CCNC: 16 U/L — SIGNIFICANT CHANGE UP (ref 10–40)
BASOPHILS # BLD AUTO: 0 K/UL — SIGNIFICANT CHANGE UP (ref 0–0.2)
BASOPHILS NFR BLD AUTO: 0 % — SIGNIFICANT CHANGE UP (ref 0–2)
BILIRUB DIRECT SERPL-MCNC: 0.1 MG/DL — SIGNIFICANT CHANGE UP (ref 0–0.3)
BILIRUB INDIRECT FLD-MCNC: 0.9 MG/DL — SIGNIFICANT CHANGE UP (ref 0.2–1)
BILIRUB SERPL-MCNC: 1 MG/DL — SIGNIFICANT CHANGE UP (ref 0.2–1.2)
BLD GP AB SCN SERPL QL: POSITIVE — SIGNIFICANT CHANGE UP
BUN SERPL-MCNC: 12 MG/DL — SIGNIFICANT CHANGE UP (ref 7–23)
CALCIUM SERPL-MCNC: 9 MG/DL — SIGNIFICANT CHANGE UP (ref 8.4–10.5)
CHLORIDE SERPL-SCNC: 102 MMOL/L — SIGNIFICANT CHANGE UP (ref 96–108)
CO2 SERPL-SCNC: 22 MMOL/L — SIGNIFICANT CHANGE UP (ref 22–31)
CREAT SERPL-MCNC: 0.71 MG/DL — SIGNIFICANT CHANGE UP (ref 0.5–1.3)
EGFR: 97 ML/MIN/1.73M2 — SIGNIFICANT CHANGE UP
ELLIPTOCYTES BLD QL SMEAR: SLIGHT — SIGNIFICANT CHANGE UP
EOSINOPHIL # BLD AUTO: 0 K/UL — SIGNIFICANT CHANGE UP (ref 0–0.5)
EOSINOPHIL NFR BLD AUTO: 0 % — SIGNIFICANT CHANGE UP (ref 0–6)
GIANT PLATELETS BLD QL SMEAR: PRESENT — SIGNIFICANT CHANGE UP
GLUCOSE SERPL-MCNC: 126 MG/DL — HIGH (ref 70–99)
HCT VFR BLD CALC: 35.5 % — SIGNIFICANT CHANGE UP (ref 34.5–45)
HGB BLD-MCNC: 11.8 G/DL — SIGNIFICANT CHANGE UP (ref 11.5–15.5)
LDH SERPL L TO P-CCNC: 169 U/L — SIGNIFICANT CHANGE UP (ref 50–242)
LYMPHOCYTES # BLD AUTO: 0.19 K/UL — LOW (ref 1–3.3)
LYMPHOCYTES # BLD AUTO: 3.6 % — LOW (ref 13–44)
MACROCYTES BLD QL: SIGNIFICANT CHANGE UP
MAGNESIUM SERPL-MCNC: 2.2 MG/DL — SIGNIFICANT CHANGE UP (ref 1.6–2.6)
MANUAL SMEAR VERIFICATION: SIGNIFICANT CHANGE UP
MCHC RBC-ENTMCNC: 31.7 PG — SIGNIFICANT CHANGE UP (ref 27–34)
MCHC RBC-ENTMCNC: 33.2 GM/DL — SIGNIFICANT CHANGE UP (ref 32–36)
MCV RBC AUTO: 95.4 FL — SIGNIFICANT CHANGE UP (ref 80–100)
MONOCYTES # BLD AUTO: 0.15 K/UL — SIGNIFICANT CHANGE UP (ref 0–0.9)
MONOCYTES NFR BLD AUTO: 2.7 % — SIGNIFICANT CHANGE UP (ref 2–14)
MRSA PCR RESULT.: SIGNIFICANT CHANGE UP
NEUTROPHILS # BLD AUTO: 5.05 K/UL — SIGNIFICANT CHANGE UP (ref 1.8–7.4)
NEUTROPHILS NFR BLD AUTO: 93.7 % — HIGH (ref 43–77)
OVALOCYTES BLD QL SMEAR: SLIGHT — SIGNIFICANT CHANGE UP
PHOSPHATE SERPL-MCNC: 3.8 MG/DL — SIGNIFICANT CHANGE UP (ref 2.5–4.5)
PLAT MORPH BLD: ABNORMAL
PLATELET # BLD AUTO: 297 K/UL — SIGNIFICANT CHANGE UP (ref 150–400)
POIKILOCYTOSIS BLD QL AUTO: SLIGHT — SIGNIFICANT CHANGE UP
POTASSIUM SERPL-MCNC: 4.1 MMOL/L — SIGNIFICANT CHANGE UP (ref 3.5–5.3)
POTASSIUM SERPL-SCNC: 4.1 MMOL/L — SIGNIFICANT CHANGE UP (ref 3.5–5.3)
PROT SERPL-MCNC: 6.5 G/DL — SIGNIFICANT CHANGE UP (ref 6–8.3)
RBC # BLD: 3.72 M/UL — LOW (ref 3.8–5.2)
RBC # FLD: 14.8 % — HIGH (ref 10.3–14.5)
RBC BLD AUTO: ABNORMAL
RH IG SCN BLD-IMP: POSITIVE — SIGNIFICANT CHANGE UP
S AUREUS DNA NOSE QL NAA+PROBE: SIGNIFICANT CHANGE UP
SODIUM SERPL-SCNC: 138 MMOL/L — SIGNIFICANT CHANGE UP (ref 135–145)
WBC # BLD: 5.39 K/UL — SIGNIFICANT CHANGE UP (ref 3.8–10.5)
WBC # FLD AUTO: 5.39 K/UL — SIGNIFICANT CHANGE UP (ref 3.8–10.5)

## 2024-10-04 PROCEDURE — 99233 SBSQ HOSP IP/OBS HIGH 50: CPT

## 2024-10-04 RX ORDER — ACETAMINOPHEN 325 MG
650 TABLET ORAL ONCE
Refills: 0 | Status: COMPLETED | OUTPATIENT
Start: 2024-10-04 | End: 2024-10-04

## 2024-10-04 RX ORDER — SODIUM CHLORIDE 0.9 % (FLUSH) 0.9 %
1000 SYRINGE (ML) INJECTION
Refills: 0 | Status: DISCONTINUED | OUTPATIENT
Start: 2024-10-04 | End: 2024-10-10

## 2024-10-04 RX ORDER — FILGRASTIM 300 UG/ML
300 INJECTION, SOLUTION INTRAVENOUS DAILY
Refills: 0 | Status: DISCONTINUED | OUTPATIENT
Start: 2024-10-09 | End: 2024-10-16

## 2024-10-04 RX ORDER — SENNOSIDES 8.6 MG
2 TABLET ORAL AT BEDTIME
Refills: 0 | Status: DISCONTINUED | OUTPATIENT
Start: 2024-10-04 | End: 2024-10-16

## 2024-10-04 RX ORDER — PROCHLORPERAZINE MALEATE 5 MG
10 TABLET ORAL EVERY 6 HOURS
Refills: 0 | Status: DISCONTINUED | OUTPATIENT
Start: 2024-10-04 | End: 2024-10-07

## 2024-10-04 RX ORDER — DIPHENHYDRAMINE HCL 12.5MG/5ML
25 LIQUID (ML) ORAL ONCE
Refills: 0 | Status: COMPLETED | OUTPATIENT
Start: 2024-10-04 | End: 2024-10-04

## 2024-10-04 RX ADMIN — Medication 150 MILLILITER(S): at 14:02

## 2024-10-04 RX ADMIN — CHLORHEXIDINE GLUCONATE ORAL RINSE 1 APPLICATION(S): 1.2 SOLUTION DENTAL at 11:18

## 2024-10-04 RX ADMIN — Medication 650 MILLIGRAM(S): at 15:21

## 2024-10-04 RX ADMIN — Medication 5 MILLILITER(S): at 08:00

## 2024-10-04 RX ADMIN — Medication 2 TABLET(S): at 22:15

## 2024-10-04 RX ADMIN — Medication 10 MILLILITER(S): at 20:27

## 2024-10-04 RX ADMIN — Medication 5 MILLILITER(S): at 20:27

## 2024-10-04 RX ADMIN — Medication 10 MILLILITER(S): at 08:00

## 2024-10-04 RX ADMIN — Medication 5 MILLILITER(S): at 11:48

## 2024-10-04 RX ADMIN — Medication 25 MILLIGRAM(S): at 15:21

## 2024-10-04 RX ADMIN — Medication 800 MILLIGRAM(S): at 17:03

## 2024-10-04 RX ADMIN — Medication 10 MILLILITER(S): at 11:48

## 2024-10-04 RX ADMIN — Medication 800 MILLIGRAM(S): at 06:32

## 2024-10-04 RX ADMIN — Medication 5 MILLILITER(S): at 16:25

## 2024-10-04 RX ADMIN — Medication 10 MILLILITER(S): at 00:31

## 2024-10-04 RX ADMIN — Medication 10 MILLILITER(S): at 16:24

## 2024-10-04 RX ADMIN — Medication 5 MILLILITER(S): at 00:31

## 2024-10-04 NOTE — PROGRESS NOTE ADULT - NS ATTEND AMEND GEN_ALL_CORE FT
58 year old female with recently diagnosed multiple myeloma (3/2024) treated with 5 cycles of Jolene-VRd, admitted for an autologous pbsct with melphalan (200mg/m2) prep regimen  1. Admit to BMTU on day -1    2. TLC placement in IR    3. Melphalan hydration: start NS at 250ml / hr for 2 hours before and 2 hours post melphalan infusion    4. On day -1;  melphalan 200mg/m2 IV once administered over 30 minutes; initiate cryotherapy (one tbsp of ice in the mouth constantly) 15 minutes before, during and following the melphalan infusion    5. Stem cell infusion on day 0    6. Start zarxio 5mcg/kg/day daily on day + 5.  1. Antiviral prophylaxis with acyclovir 800 po BID to start on admission    2. Fluconazole to start when ANC < 500 and continue through engraftment    3. Levaquin to start when ANC < 1000   4. Bactrim SS for PCP prophylaxis post engraftment    5. CMV PCR weekly post engraftment through day + 100  6. transfuse as per guidelines  7. follow i/o, replete lytes prn  8. mouth and skin care 58 year old female with recently diagnosed multiple myeloma (3/2024) treated with 5 cycles of Jolene-VRd, admitted for an autologous pbsct with melphalan (200mg/m2) prep regimen  1. Admit to BMTU on day 0..mild nausea    2. TLC placement in IR    3. Melphalan hydration: start NS at 250ml / hr for 2 hours before and 2 hours post melphalan infusion    4. On day -1;  melphalan 200mg/m2 IV once administered over 30 minutes; initiate cryotherapy (one tbsp of ice in the mouth constantly) 15 minutes before, during and following the melphalan infusion    5. Stem cell infusion on day 0    6. Start zarxio 5mcg/kg/day daily on day + 5.  1. Antiviral prophylaxis with acyclovir 800 po BID to start on admission    2. Fluconazole to start when ANC < 500 and continue through engraftment    3. Levaquin to start when ANC < 1000   4. Bactrim SS for PCP prophylaxis post engraftment    5. CMV PCR weekly post engraftment through day + 100  6. transfuse as per guidelines  7. follow i/o, replete lytes prn  8. mouth and skin care

## 2024-10-04 NOTE — CHART NOTE - NSCHARTNOTEFT_GEN_A_CORE
After premedications, Ms Tanner received 427ml of pooled, thawed, mobilized, plasma reduced [facility]PC. Cell count as follows:  Total MNC's (x10^8/kg): 17  CD34+ Cells (x10^6/kg): 6.3  Cell Viability (%): 76  Pt tolerated infusion without adverse reactions.

## 2024-10-04 NOTE — PROGRESS NOTE ADULT - SUBJECTIVE AND OBJECTIVE BOX
[facility] Transplant Team                                                      Critical / Counseling Time Provided: 30 minutes                                                                                                                                                        Chief Complaint: Autologous peripheral blood stem cell transplant with high dose melphalan (200mg/m2) prep regimen for treatment of multiple myeloma    Disease: MM  Type of Transplant: Autologous  Conditioning Prep: Melphalan (200mg/m2)  ABO/CMV: B pos/CMV-      S: Patient seen and examined with [facility]PC Transplant Team:       O:     Vital Signs Last 24 Hrs  T(C): 36.2 (04 Oct 2024 05:45), Max: 36.5 (03 Oct 2024 17:34)  T(F): 97.1 (04 Oct 2024 05:45), Max: 97.7 (03 Oct 2024 17:34)  HR: 94 (04 Oct 2024 05:45) (84 - 94)  BP: 140/86 (04 Oct 2024 05:45) (140/86 - 160/79)  BP(mean): --  RR: 18 (04 Oct 2024 05:45) (18 - 18)  SpO2: 97% (04 Oct 2024 05:45) (97% - 100%)    Parameters below as of 04 Oct 2024 05:45  Patient On (Oxygen Delivery Method): room air        Admit weight:    Daily     Intake / Output:   10-03 @ 07:01  -  10-04 @ 07:00  --------------------------------------------------------  IN: 2919 mL / OUT: 4950 mL / NET: -2031 mL          PE:   Oropharynx:   Oral Mucositis:                                                        Grade:   CVS:   Lungs:   Abdomen:  Extremities:   Gastric Mucositis:                                                  Grade:   Intestinal Mucositis:                                              Grade:   Skin:   TLC:   Neuro:   Pain:     Labs:   CBC Full  -  ( 04 Oct 2024 07:08 )  WBC Count : 5.39 K/uL  Hemoglobin : 11.8 g/dL  Hematocrit : 35.5 %  Platelet Count - Automated : 297 K/uL  Mean Cell Volume : 95.4 fl  Mean Cell Hemoglobin : 31.7 pg  Mean Cell Hemoglobin Concentration : 33.2 gm/dL  Auto Neutrophil # : x  Auto Lymphocyte # : x  Auto Monocyte # : x  Auto Eosinophil # : x  Auto Basophil # : x  Auto Neutrophil % : x  Auto Lymphocyte % : x  Auto Monocyte % : x  Auto Eosinophil % : x  Auto Basophil % : x                          11.8   5.39  )-----------( 297      ( 04 Oct 2024 07:08 )             35.5         Cultures:         Radiology:       Meds:   Antimicrobials:   acyclovir   Oral Tab/Cap 800 milliGRAM(s) Oral every 12 hours      Heme / Onc:       GI:  sodium bicarbonate Mouth Rinse 10 milliLiter(s) Swish and Spit five times a day      Cardiovascular:       Immunologic:       Other medications:   acetaminophen     Tablet .. 650 milliGRAM(s) Oral every 6 hours  Biotene Dry Mouth Oral Rinse 5 milliLiter(s) Swish and Spit five times a day  chlorhexidine 2% Cloths 1 Application(s) Topical <User Schedule>  chlorhexidine 4% Liquid 1 Application(s) Topical <User Schedule>  phytonadione   Solution 5 milliGRAM(s) Oral <User Schedule>  sodium chloride 0.9%. 500 milliLiter(s) IV Continuous <Continuous>      PRN:   acetaminophen     Tablet .. 650 milliGRAM(s) Oral every 6 hours PRN  sodium chloride 0.9% lock flush 10 milliLiter(s) IV Push every 1 hour PRN      58 year old female with recently diagnosed multiple myeloma (3/2024) treated with 5 cycles of Jolene-VRd, admitted for an autologous pbsct with melphalan (200mg/m2) prep regimen.  Today is Day 0      Infectious Disease:   Acyclovir 800 bid  Fluconazole to start when ANC < 500 and continue through engraftment    Levaquin to start when ANC < 1000   Bactrim SS for PCP prophylaxis post engraftment    CMV PCR weekly post engraftment through day + 100.    GI Prophylaxis:  Protonix    Mouthcare - NS / NaHCO3 rinses, Skin care     Transfuse & replete electrolytes prn      IV hydration, daily weights, strict I&O, prn diuresis      PO intake as tolerated, nutrition follow up as needed, MVI, folic acid      Antiemetics, anti-diarrhea medications:       OOB as tolerated, physical therapy consult if needed      Monitor coags 2x week, vitamin K 5mg po BIW (Mon and Thurs)    Monitor closely for clinical changes, monitor for fevers     Emotional support provided, plan of care discussed and questions addressed     Patient education done regarding chemotherapy prep, plan of care, restrictions and discharge planning     Continue regular social work input     I have written the above note for Dr. Stevens who performed service with me in the room.   Jr Hsieh PA-C ([phone])    I have seen and examined patient with PA, I agree with above note as scribed.                    [facility] Transplant Team                                                      Critical / Counseling Time Provided: 30 minutes                                                                                                                                                        Chief Complaint: Autologous peripheral blood stem cell transplant with high dose melphalan (200mg/m2) prep regimen for treatment of multiple myeloma    Disease: MM  Type of Transplant: Autologous  Conditioning Prep: Melphalan (200mg/m2)  ABO/CMV: B pos/CMV-      S: Patient seen and examined with [facility]PC Transplant Team:   +Nausea     O: rest of ROS negative    Vital Signs Last 24 Hrs  T(C): 36.2 (04 Oct 2024 05:45), Max: 36.5 (03 Oct 2024 17:34)  T(F): 97.1 (04 Oct 2024 05:45), Max: 97.7 (03 Oct 2024 17:34)  HR: 94 (04 Oct 2024 05:45) (84 - 94)  BP: 140/86 (04 Oct 2024 05:45) (140/86 - 160/79)  BP(mean): --  RR: 18 (04 Oct 2024 05:45) (18 - 18)  SpO2: 97% (04 Oct 2024 05:45) (97% - 100%)    Parameters below as of 04 Oct 2024 05:45  Patient On (Oxygen Delivery Method): room air    Admit weight:  58.9kg  Daily 58.2kg    Intake / Output:   10-03 @ 07:01  -  10-04 @ 07:00  --------------------------------------------------------  IN: 2919 mL / OUT: 4950 mL / NET: -2031 mL      PE:   Oropharynx: Celar, no oral lesions  Oral Mucositis:                                                        stGstrstastdstest:st st1st CVS: +S1,S2, reg  Lungs: CTA b/l  Abdomen: +BS, soft, NT/ND  Extremities: no edema BLE's  Gastric Mucositis:                                                  stGstrstastdstest:st st1st Intestinal Mucositis:                                              stGstrstastdstest:st st1st Skin: no rash  TLC: cdi  Neuro: A&O x 3  Pain: 0    Labs:                         11.8   5.39  )-----------( 297      ( 04 Oct 2024 07:08 )             35.5     04 Oct 2024 07:08    138    |  102    |  12     ----------------------------<  126    4.1     |  22     |  0.71     Ca    9.0        04 Oct 2024 07:08  Phos  3.8       04 Oct 2024 07:08  Mg     2.2       04 Oct 2024 07:08    TPro  6.5    /  Alb  4.3    /  TBili  1.0    /  DBili  0.1    /  AST  16     /  ALT  21     /  AlkPhos  81     04 Oct 2024 07:08    PT/INR - ( 03 Oct 2024 11:21 )   PT: 11.2 sec;   INR: 0.97 ratio    PTT - ( 03 Oct 2024 11:21 )  PTT:30.7 sec      LIVER FUNCTIONS - ( 04 Oct 2024 07:08 )  Alb: 4.3 g/dL / Pro: 6.5 g/dL / ALK PHOS: 81 U/L / ALT: 21 U/L / AST: 16 U/L / GGT: x           Urinalysis Basic - ( 04 Oct 2024 07:08 )    Color: x / Appearance: x / SG: x / pH: x  Gluc: 126 mg/dL / Ketone: x  / Bili: x / Urobili: x   Blood: x / Protein: x / Nitrite: x   Leuk Esterase: x / RBC: x / WBC x   Sq Epi: x / Non Sq Epi: x / Bacteria: x        Cultures:         Radiology:       Meds:   Antimicrobials:   acyclovir   Oral Tab/Cap 800 milliGRAM(s) Oral every 12 hours      Heme / Onc:       GI:  sodium bicarbonate Mouth Rinse 10 milliLiter(s) Swish and Spit five times a day      Cardiovascular:       Immunologic:       Other medications:   acetaminophen     Tablet .. 650 milliGRAM(s) Oral every 6 hours  Biotene Dry Mouth Oral Rinse 5 milliLiter(s) Swish and Spit five times a day  chlorhexidine 2% Cloths 1 Application(s) Topical <User Schedule>  chlorhexidine 4% Liquid 1 Application(s) Topical <User Schedule>  phytonadione   Solution 5 milliGRAM(s) Oral <User Schedule>  sodium chloride 0.9%. 500 milliLiter(s) IV Continuous <Continuous>      PRN:   acetaminophen     Tablet .. 650 milliGRAM(s) Oral every 6 hours PRN  sodium chloride 0.9% lock flush 10 milliLiter(s) IV Push every 1 hour PRN      58 year old female with recently diagnosed multiple myeloma (3/2024) treated with 5 cycles of Jolene-VRd, admitted for an autologous pbsct with melphalan (200mg/m2) prep regimen.  Today is Day 0      1. Infectious Disease:   Acyclovir 800 bid  Fluconazole to start when ANC < 500 and continue through engraftment    Levaquin to start when ANC < 1000   Bactrim SS for PCP prophylaxis post engraftment    CMV PCR weekly post engraftment through day + 100.    2. GI Prophylaxis:  Protonix    3.Mouthcare - NS / NaHCO3 rinses, Skin care     4. Transfuse & replete electrolytes prn      5. IV hydration, daily weights, strict I&O, prn diuresis      6. PO intake as tolerated, nutrition follow up as needed, MVI, folic acid      7. Antiemetics, anti-diarrhea medications:       8. OOB as tolerated, physical therapy consult if needed      9. Monitor coags 2x week, vitamin K 5mg po BIW (Mon and Thurs)    10. Monitor closely for clinical changes, monitor for fevers     11. Emotional support provided, plan of care discussed and questions addressed     12. Patient education done regarding chemotherapy prep, plan of care, restrictions and discharge planning     13. Continue regular social work input     I have written the above note for Dr. Stevens who performed service with me in the room.   Jr Hsieh PA-C ([phone])    I have seen and examined patient with PA, I agree with above note as scribed.

## 2024-10-05 LAB
ALBUMIN SERPL ELPH-MCNC: 3.6 G/DL — SIGNIFICANT CHANGE UP (ref 3.3–5)
ALP SERPL-CCNC: 69 U/L — SIGNIFICANT CHANGE UP (ref 40–120)
ALT FLD-CCNC: 17 U/L — SIGNIFICANT CHANGE UP (ref 10–45)
ANION GAP SERPL CALC-SCNC: 11 MMOL/L — SIGNIFICANT CHANGE UP (ref 5–17)
AST SERPL-CCNC: 22 U/L — SIGNIFICANT CHANGE UP (ref 10–40)
BASOPHILS # BLD AUTO: 0.01 K/UL — SIGNIFICANT CHANGE UP (ref 0–0.2)
BASOPHILS NFR BLD AUTO: 0.1 % — SIGNIFICANT CHANGE UP (ref 0–2)
BILIRUB SERPL-MCNC: 1 MG/DL — SIGNIFICANT CHANGE UP (ref 0.2–1.2)
BUN SERPL-MCNC: 17 MG/DL — SIGNIFICANT CHANGE UP (ref 7–23)
CALCIUM SERPL-MCNC: 8.1 MG/DL — LOW (ref 8.4–10.5)
CHLORIDE SERPL-SCNC: 107 MMOL/L — SIGNIFICANT CHANGE UP (ref 96–108)
CO2 SERPL-SCNC: 23 MMOL/L — SIGNIFICANT CHANGE UP (ref 22–31)
CREAT SERPL-MCNC: 0.71 MG/DL — SIGNIFICANT CHANGE UP (ref 0.5–1.3)
EGFR: 97 ML/MIN/1.73M2 — SIGNIFICANT CHANGE UP
EOSINOPHIL # BLD AUTO: 0.01 K/UL — SIGNIFICANT CHANGE UP (ref 0–0.5)
EOSINOPHIL NFR BLD AUTO: 0.1 % — SIGNIFICANT CHANGE UP (ref 0–6)
GLUCOSE SERPL-MCNC: 86 MG/DL — SIGNIFICANT CHANGE UP (ref 70–99)
HCT VFR BLD CALC: 31.5 % — LOW (ref 34.5–45)
HGB BLD-MCNC: 10.4 G/DL — LOW (ref 11.5–15.5)
IMM GRANULOCYTES NFR BLD AUTO: 0.5 % — SIGNIFICANT CHANGE UP (ref 0–0.9)
LDH SERPL L TO P-CCNC: 394 U/L — HIGH (ref 50–242)
LYMPHOCYTES # BLD AUTO: 0.78 K/UL — LOW (ref 1–3.3)
LYMPHOCYTES # BLD AUTO: 5.8 % — LOW (ref 13–44)
MAGNESIUM SERPL-MCNC: 2.2 MG/DL — SIGNIFICANT CHANGE UP (ref 1.6–2.6)
MCHC RBC-ENTMCNC: 31.8 PG — SIGNIFICANT CHANGE UP (ref 27–34)
MCHC RBC-ENTMCNC: 33 GM/DL — SIGNIFICANT CHANGE UP (ref 32–36)
MCV RBC AUTO: 96.3 FL — SIGNIFICANT CHANGE UP (ref 80–100)
MONOCYTES # BLD AUTO: 0.23 K/UL — SIGNIFICANT CHANGE UP (ref 0–0.9)
MONOCYTES NFR BLD AUTO: 1.7 % — LOW (ref 2–14)
NEUTROPHILS # BLD AUTO: 12.38 K/UL — HIGH (ref 1.8–7.4)
NEUTROPHILS NFR BLD AUTO: 91.8 % — HIGH (ref 43–77)
NRBC # BLD: 0 /100 WBCS — SIGNIFICANT CHANGE UP (ref 0–0)
PHOSPHATE SERPL-MCNC: 3.2 MG/DL — SIGNIFICANT CHANGE UP (ref 2.5–4.5)
PLATELET # BLD AUTO: 231 K/UL — SIGNIFICANT CHANGE UP (ref 150–400)
POTASSIUM SERPL-MCNC: 3.6 MMOL/L — SIGNIFICANT CHANGE UP (ref 3.5–5.3)
POTASSIUM SERPL-SCNC: 3.6 MMOL/L — SIGNIFICANT CHANGE UP (ref 3.5–5.3)
PROT SERPL-MCNC: 5.6 G/DL — LOW (ref 6–8.3)
RBC # BLD: 3.27 M/UL — LOW (ref 3.8–5.2)
RBC # FLD: 15 % — HIGH (ref 10.3–14.5)
SODIUM SERPL-SCNC: 141 MMOL/L — SIGNIFICANT CHANGE UP (ref 135–145)
WBC # BLD: 13.48 K/UL — HIGH (ref 3.8–10.5)
WBC # FLD AUTO: 13.48 K/UL — HIGH (ref 3.8–10.5)

## 2024-10-05 PROCEDURE — 99233 SBSQ HOSP IP/OBS HIGH 50: CPT

## 2024-10-05 RX ORDER — ONDANSETRON HCL/PF 4 MG/2 ML
8 VIAL (ML) INJECTION ONCE
Refills: 0 | Status: COMPLETED | OUTPATIENT
Start: 2024-10-05 | End: 2024-10-05

## 2024-10-05 RX ADMIN — Medication 150 MILLILITER(S): at 09:41

## 2024-10-05 RX ADMIN — Medication 10 MILLILITER(S): at 16:19

## 2024-10-05 RX ADMIN — Medication 5 MILLILITER(S): at 00:23

## 2024-10-05 RX ADMIN — CHLORHEXIDINE GLUCONATE ORAL RINSE 1 APPLICATION(S): 1.2 SOLUTION DENTAL at 11:37

## 2024-10-05 RX ADMIN — Medication 10 MILLILITER(S): at 19:50

## 2024-10-05 RX ADMIN — Medication 10 MILLILITER(S): at 08:00

## 2024-10-05 RX ADMIN — Medication 5 MILLILITER(S): at 11:36

## 2024-10-05 RX ADMIN — Medication 10 MILLILITER(S): at 00:22

## 2024-10-05 RX ADMIN — Medication 8 MILLIGRAM(S): at 21:30

## 2024-10-05 RX ADMIN — Medication 800 MILLIGRAM(S): at 17:03

## 2024-10-05 RX ADMIN — Medication 5 MILLILITER(S): at 09:41

## 2024-10-05 RX ADMIN — Medication 800 MILLIGRAM(S): at 06:23

## 2024-10-05 RX ADMIN — Medication 10 MILLILITER(S): at 11:36

## 2024-10-05 RX ADMIN — Medication 5 MILLILITER(S): at 19:50

## 2024-10-05 RX ADMIN — Medication 5 MILLILITER(S): at 16:20

## 2024-10-05 NOTE — PROGRESS NOTE ADULT - NS ATTEND AMEND GEN_ALL_CORE FT
58 year old female with recently diagnosed multiple myeloma (3/2024) treated with 5 cycles of Jolene-VRd, admitted for an autologous pbsct with melphalan (200mg/m2) prep regimen  1. Admit to BMTU on day 1..mild nausea  ....getting post infusion hydration..weight noted  2. TLC placement in IR    3. Melphalan hydration: start NS at 250ml / hr for 2 hours before and 2 hours post melphalan infusion    4. On day -1;  melphalan 200mg/m2 IV once administered over 30 minutes; initiate cryotherapy (one tbsp of ice in the mouth constantly) 15 minutes before, during and following the melphalan infusion    5. Stem cell infusion on day 0    6. Start zarxio 5mcg/kg/day daily on day + 5.  1. Antiviral prophylaxis with acyclovir 800 po BID to start on admission    2. Fluconazole to start when ANC < 500 and continue through engraftment    3. Levaquin to start when ANC < 1000   4. Bactrim SS for PCP prophylaxis post engraftment    5. CMV PCR weekly post engraftment through day + 100  6. transfuse as per guidelines  7. follow i/o, replete lytes prn  8. mouth and skin care

## 2024-10-05 NOTE — PROGRESS NOTE ADULT - SUBJECTIVE AND OBJECTIVE BOX
HPC Transplant Team                                                      Critical / Counseling Time Provided: 30 minutes                                                                                                                                                        Chief Complaint: Autologous peripheral blood stem cell transplant with high dose melphalan (200mg/m2) prep regimen for treatment of multiple myeloma    Disease: MM  Type of Transplant: Autologous  Conditioning Prep: Melphalan (200mg/m2)  ABO/CMV: B pos/CMV-    S: Patient seen and examined with HPC Transplant Team:   +nausea    O: rest of ROS negative     Vital Signs Last 24 Hrs  T(C): 36.7 (05 Oct 2024 05:59), Max: 36.7 (04 Oct 2024 12:00)  T(F): 98.1 (05 Oct 2024 05:59), Max: 98.1 (04 Oct 2024 12:00)  HR: 81 (05 Oct 2024 05:59) (81 - 98)  BP: 134/74 (05 Oct 2024 05:59) (115/66 - 148/80)  RR: 16 (05 Oct 2024 05:59) (16 - 18)  SpO2: 99% (05 Oct 2024 05:59) (96% - 100%)    Parameters below as of 05 Oct 2024 05:59  Patient On (Oxygen Delivery Method): room air        Admit weight:   Daily Weight in k.2 (04 Oct 2024 08:44)    Intake / Output:   10-04 @ 07:01  -  10-05 @ 07:00  --------------------------------------------------------  IN: 3950 mL / OUT: 6450 mL / NET: -2500 mL    PE:   Oropharynx: Celar, no oral lesions  Oral Mucositis:                                                        stGstrstastdstest:st st1st CVS: +S1,S2, reg  Lungs: CTA b/l  Abdomen: +BS, soft, NT/ND  Extremities: no edema BLE's  Gastric Mucositis:                                                  stGstrstastdstest:st st1st Intestinal Mucositis:                                              stGstrstastdstest:st st1st Skin: no rash  TLC: cdi  Neuro: A&O x 3  Pain: 0    Labs:   CBC Full  -  ( 05 Oct 2024 07:04 )  WBC Count : 13.48 K/uL  Hemoglobin : 10.4 g/dL  Hematocrit : 31.5 %  Platelet Count - Automated : 231 K/uL  Mean Cell Volume : 96.3 fl  Mean Cell Hemoglobin : 31.8 pg  Mean Cell Hemoglobin Concentration : 33.0 gm/dL  Auto Neutrophil # : 12.38 K/uL  Auto Lymphocyte # : 0.78 K/uL  Auto Monocyte # : 0.23 K/uL  Auto Eosinophil # : 0.01 K/uL  Auto Basophil # : 0.01 K/uL  Auto Neutrophil % : 91.8 %  Auto Lymphocyte % : 5.8 %  Auto Monocyte % : 1.7 %  Auto Eosinophil % : 0.1 %  Auto Basophil % : 0.1 %                          10.4   13.48 )-----------( 231      ( 05 Oct 2024 07:04 )             31.5     10-    141  |  107  |  17  ----------------------------<  86  3.6   |  23  |  0.71    Ca    8.1[L]      05 Oct 2024 07:04  Phos  3.2     10-05  Mg     2.2     10    TPro  5.6[L]  /  Alb  3.6  /  TBili  1.0  /  DBili  x   /  AST  22  /  ALT  17  /  AlkPhos  69  10-05    PT/INR - ( 03 Oct 2024 11:21 )   PT: 11.2 sec;   INR: 0.97 ratio         PTT - ( 03 Oct 2024 11:21 )  PTT:30.7 sec  LIVER FUNCTIONS - ( 05 Oct 2024 07:04 )  Alb: 3.6 g/dL / Pro: 5.6 g/dL / ALK PHOS: 69 U/L / ALT: 17 U/L / AST: 22 U/L / GGT: x           Lactate Dehydrogenase, Serum: 394 U/L (10-05 @ 07:04)    Cultures:     Radiology:     Meds:   Antimicrobials:   acyclovir   Oral Tab/Cap 800 milliGRAM(s) Oral every 12 hours      Heme / Onc:       GI:  senna 2 Tablet(s) Oral at bedtime PRN  sodium bicarbonate Mouth Rinse 10 milliLiter(s) Swish and Spit five times a day      Cardiovascular:       Immunologic:       Other medications:   acetaminophen     Tablet .. 650 milliGRAM(s) Oral every 6 hours  Biotene Dry Mouth Oral Rinse 5 milliLiter(s) Swish and Spit five times a day  chlorhexidine 2% Cloths 1 Application(s) Topical <User Schedule>  chlorhexidine 4% Liquid 1 Application(s) Topical <User Schedule>  phytonadione   Solution 5 milliGRAM(s) Oral <User Schedule>  sodium chloride 0.9%. 1000 milliLiter(s) IV Continuous <Continuous>  sodium chloride 0.9%. 500 milliLiter(s) IV Continuous <Continuous>      PRN:   acetaminophen     Tablet .. 650 milliGRAM(s) Oral every 6 hours PRN  prochlorperazine   Injectable 10 milliGRAM(s) IntraMuscular every 6 hours PRN  senna 2 Tablet(s) Oral at bedtime PRN  sodium chloride 0.9% lock flush 10 milliLiter(s) IV Push every 1 hour PRN      58 year old female with recently diagnosed multiple myeloma (3/2024) treated with 5 cycles of Jolene-VRd, admitted for an autologous pbsct with melphalan (200mg/m2) prep regimen.  Today is Day +1      1. Infectious Disease:   Acyclovir 800 bid  Fluconazole to start when ANC < 500 and continue through engraftment    Levaquin to start when ANC < 1000   Bactrim SS for PCP prophylaxis post engraftment    CMV PCR weekly post engraftment through day + 100.    2. GI Prophylaxis:  Protonix    3.Mouthcare - NS / NaHCO3 rinses, Skin care     4. Transfuse & replete electrolytes prn      5. IV hydration, daily weights, strict I&O, prn diuresis      6. PO intake as tolerated, nutrition follow up as needed, MVI, folic acid      7. Antiemetics, anti-diarrhea medications:       8. OOB as tolerated, physical therapy consult if needed      9. Monitor coags 2x week, vitamin K 5mg po BIW (Mon and Thurs)    10. Monitor closely for clinical changes, monitor for fevers     11. Emotional support provided, plan of care discussed and questions addressed     12. Patient education done regarding chemotherapy prep, plan of care, restrictions and discharge planning     13. Continue regular social work input       I have written the above note for Dr. Stevens who performed service with me in the room.   I have seen and examined patient with NP, I agree with above note as scribed.   Rona Smith NP-C        Attending and PA/NP shared services statement (NON-critical care):     Attending to bill.     I attest my time as attending is greater than 50% of the total combined time spent on qualifying patient care activities by the PA/NP and attending.     I have made amendments to the documentation where necessary. Additional comments: 58 year old female with recently diagnosed multiple myeloma (3/2024) treated with 5 cycles of Jolene-VRd, admitted for an autologous pbsct with melphalan (200mg/m2) prep regimen  1. Admit to BMTU on day 0..mild nausea    2. TLC placement in IR    3. Melphalan hydration: start NS at 250ml / hr for 2 hours before and 2 hours post melphalan infusion    4. On day -1;  melphalan 200mg/m2 IV once administered over 30 minutes; initiate cryotherapy (one tbsp of ice in the mouth constantly) 15 minutes before, during and following the melphalan infusion    5. Stem cell infusion on day 0    6. Start zarxio 5mcg/kg/day daily on day + 5.  1. Antiviral prophylaxis with acyclovir 800 po BID to start on admission    2. Fluconazole to start when ANC < 500 and continue through engraftment    3. Levaquin to start when ANC < 1000   4. Bactrim SS for PCP prophylaxis post engraftment    5. CMV PCR weekly post engraftment through day + 100  6. transfuse as per guidelines  7. follow i/o, replete lytes prn  8. mouth and skin care.             HPC Transplant Team                                                      Critical / Counseling Time Provided: 30 minutes                                                                                                                                                        Chief Complaint: Autologous peripheral blood stem cell transplant with high dose melphalan (200mg/m2) prep regimen for treatment of multiple myeloma    Disease: MM  Type of Transplant: Autologous  Conditioning Prep: Melphalan (200mg/m2)  ABO/CMV: B pos/CMV-    S: Patient seen and examined with HPC Transplant Team:   +nausea    O: rest of ROS negative     Vital Signs Last 24 Hrs  T(C): 36.7 (05 Oct 2024 05:59), Max: 36.7 (04 Oct 2024 12:00)  T(F): 98.1 (05 Oct 2024 05:59), Max: 98.1 (04 Oct 2024 12:00)  HR: 81 (05 Oct 2024 05:59) (81 - 98)  BP: 134/74 (05 Oct 2024 05:59) (115/66 - 148/80)  RR: 16 (05 Oct 2024 05:59) (16 - 18)  SpO2: 99% (05 Oct 2024 05:59) (96% - 100%)    Parameters below as of 05 Oct 2024 05:59  Patient On (Oxygen Delivery Method): room air    Daily     Daily Weight in k.1 (05 Oct 2024 08:30)    Intake / Output:   10-04 @ 07:01  -  10-05 @ 07:00  --------------------------------------------------------  IN: 3950 mL / OUT: 6450 mL / NET: -2500 mL    PE:   Oropharynx: Celar, no oral lesions  Oral Mucositis:                                                        stGstrstastdstest:st st1st CVS: +S1,S2, reg  Lungs: CTA b/l  Abdomen: +BS, soft, NT/ND  Extremities: no edema BLE's  Gastric Mucositis:                                                  stGstrstastdstest:st st1st Intestinal Mucositis:                                              stGstrstastdstest:st st1st Skin: no rash  TLC: cdi  Neuro: A&O x 3  Pain: 0    Labs:   CBC Full  -  ( 05 Oct 2024 07:04 )  WBC Count : 13.48 K/uL  Hemoglobin : 10.4 g/dL  Hematocrit : 31.5 %  Platelet Count - Automated : 231 K/uL  Mean Cell Volume : 96.3 fl  Mean Cell Hemoglobin : 31.8 pg  Mean Cell Hemoglobin Concentration : 33.0 gm/dL  Auto Neutrophil # : 12.38 K/uL  Auto Lymphocyte # : 0.78 K/uL  Auto Monocyte # : 0.23 K/uL  Auto Eosinophil # : 0.01 K/uL  Auto Basophil # : 0.01 K/uL  Auto Neutrophil % : 91.8 %  Auto Lymphocyte % : 5.8 %  Auto Monocyte % : 1.7 %  Auto Eosinophil % : 0.1 %  Auto Basophil % : 0.1 %                          10.4   13.48 )-----------( 231      ( 05 Oct 2024 07:04 )             31.5     10    141  |  107  |  17  ----------------------------<  86  3.6   |  23  |  0.71    Ca    8.1[L]      05 Oct 2024 07:04  Phos  3.2     10-  Mg     2.2     10-05    TPro  5.6[L]  /  Alb  3.6  /  TBili  1.0  /  DBili  x   /  AST  22  /  ALT  17  /  AlkPhos  69  10-05    PT/INR - ( 03 Oct 2024 11:21 )   PT: 11.2 sec;   INR: 0.97 ratio    PTT - ( 03 Oct 2024 11:21 )  PTT:30.7 sec  LIVER FUNCTIONS - ( 05 Oct 2024 07:04 )  Alb: 3.6 g/dL / Pro: 5.6 g/dL / ALK PHOS: 69 U/L / ALT: 17 U/L / AST: 22 U/L / GGT: x           Lactate Dehydrogenase, Serum: 394 U/L (10-05 @ 07:04)    Cultures:     Radiology:     Meds:   Antimicrobials:   acyclovir   Oral Tab/Cap 800 milliGRAM(s) Oral every 12 hours    Heme /Onc:     GI:  senna 2 Tablet(s) Oral at bedtime PRN  sodium bicarbonate Mouth Rinse 10 milliLiter(s) Swish and Spit five times a day    Cardiovascular:     Immunologic:     Other medications:   acetaminophen     Tablet .. 650 milliGRAM(s) Oral every 6 hours  Biotene Dry Mouth Oral Rinse 5 milliLiter(s) Swish and Spit five times a day  chlorhexidine 2% Cloths 1 Application(s) Topical <User Schedule>  chlorhexidine 4% Liquid 1 Application(s) Topical <User Schedule>  phytonadione   Solution 5 milliGRAM(s) Oral <User Schedule>  sodium chloride 0.9%. 1000 milliLiter(s) IV Continuous <Continuous>  sodium chloride 0.9%. 500 milliLiter(s) IV Continuous <Continuous>      PRN:   acetaminophen  Tablet .. 650 milliGRAM(s) Oral every 6 hours PRN  prochlorperazine Injectable 10 milliGRAM(s) IntraMuscular every 6 hours PRN  senna 2 Tablet(s) Oral at bedtime PRN  sodium chloride 0.9% lock flush 10 milliLiter(s) IV Push every 1 hour PRN      58 year old female with recently diagnosed multiple myeloma (3/2024) treated with 5 cycles of Jolene-VRd, admitted for an autologous pbsct with melphalan (200mg/m2) prep regimen.  Today is Day +1      1. Infectious Disease:   Acyclovir 800 bid  Fluconazole to start when ANC < 500 and continue through engraftment    Levaquin to start when ANC < 1000   Bactrim SS for PCP prophylaxis post engraftment    CMV PCR weekly post engraftment through day + 100.    2. GI Prophylaxis:  Protonix    3.Mouthcare - NS / NaHCO3 rinses, Skin care     4. Transfuse & replete electrolytes prn      5. IV hydration, daily weights, strict I&O, prn diuresis      6. PO intake as tolerated, nutrition follow up as needed, MVI, folic acid      7. Antiemetics, anti-diarrhea medications:       8. OOB as tolerated, physical therapy consult if needed      9. Monitor coags 2x week, vitamin K 5mg po BIW (Mon and Thurs)    10. Monitor closely for clinical changes, monitor for fevers     11. Emotional support provided, plan of care discussed and questions addressed     12. Patient education done regarding chemotherapy prep, plan of care, restrictions and discharge planning     13. Continue regular social work input       I have written the above note for Dr. Stevens who performed service with me in the room.   I have seen and examined patient with NP, I agree with above note as scribed.   Rona TRINIDADC

## 2024-10-06 LAB
ALBUMIN SERPL ELPH-MCNC: 3.9 G/DL — SIGNIFICANT CHANGE UP (ref 3.3–5)
ALP SERPL-CCNC: 75 U/L — SIGNIFICANT CHANGE UP (ref 40–120)
ALT FLD-CCNC: 18 U/L — SIGNIFICANT CHANGE UP (ref 10–45)
ANION GAP SERPL CALC-SCNC: 12 MMOL/L — SIGNIFICANT CHANGE UP (ref 5–17)
AST SERPL-CCNC: 18 U/L — SIGNIFICANT CHANGE UP (ref 10–40)
BASOPHILS # BLD AUTO: 0 K/UL — SIGNIFICANT CHANGE UP (ref 0–0.2)
BASOPHILS NFR BLD AUTO: 0 % — SIGNIFICANT CHANGE UP (ref 0–2)
BILIRUB SERPL-MCNC: 1.5 MG/DL — HIGH (ref 0.2–1.2)
BUN SERPL-MCNC: 9 MG/DL — SIGNIFICANT CHANGE UP (ref 7–23)
CALCIUM SERPL-MCNC: 8.6 MG/DL — SIGNIFICANT CHANGE UP (ref 8.4–10.5)
CHLORIDE SERPL-SCNC: 102 MMOL/L — SIGNIFICANT CHANGE UP (ref 96–108)
CO2 SERPL-SCNC: 24 MMOL/L — SIGNIFICANT CHANGE UP (ref 22–31)
CREAT SERPL-MCNC: 0.67 MG/DL — SIGNIFICANT CHANGE UP (ref 0.5–1.3)
EGFR: 100 ML/MIN/1.73M2 — SIGNIFICANT CHANGE UP
EOSINOPHIL # BLD AUTO: 0 K/UL — SIGNIFICANT CHANGE UP (ref 0–0.5)
EOSINOPHIL NFR BLD AUTO: 0 % — SIGNIFICANT CHANGE UP (ref 0–6)
GLUCOSE SERPL-MCNC: 88 MG/DL — SIGNIFICANT CHANGE UP (ref 70–99)
HCT VFR BLD CALC: 33.7 % — LOW (ref 34.5–45)
HGB BLD-MCNC: 11.2 G/DL — LOW (ref 11.5–15.5)
LDH SERPL L TO P-CCNC: 270 U/L — HIGH (ref 50–242)
LYMPHOCYTES # BLD AUTO: 0.4 K/UL — LOW (ref 1–3.3)
LYMPHOCYTES # BLD AUTO: 6.1 % — LOW (ref 13–44)
MAGNESIUM SERPL-MCNC: 2.2 MG/DL — SIGNIFICANT CHANGE UP (ref 1.6–2.6)
MANUAL SMEAR VERIFICATION: SIGNIFICANT CHANGE UP
MCHC RBC-ENTMCNC: 31.7 PG — SIGNIFICANT CHANGE UP (ref 27–34)
MCHC RBC-ENTMCNC: 33.2 GM/DL — SIGNIFICANT CHANGE UP (ref 32–36)
MCV RBC AUTO: 95.5 FL — SIGNIFICANT CHANGE UP (ref 80–100)
MONOCYTES # BLD AUTO: 0 K/UL — SIGNIFICANT CHANGE UP (ref 0–0.9)
MONOCYTES NFR BLD AUTO: 0 % — LOW (ref 2–14)
NEUTROPHILS # BLD AUTO: 6.15 K/UL — SIGNIFICANT CHANGE UP (ref 1.8–7.4)
NEUTROPHILS NFR BLD AUTO: 93.9 % — HIGH (ref 43–77)
PHOSPHATE SERPL-MCNC: 3 MG/DL — SIGNIFICANT CHANGE UP (ref 2.5–4.5)
PLAT MORPH BLD: NORMAL — SIGNIFICANT CHANGE UP
PLATELET # BLD AUTO: 228 K/UL — SIGNIFICANT CHANGE UP (ref 150–400)
POTASSIUM SERPL-MCNC: 3.5 MMOL/L — SIGNIFICANT CHANGE UP (ref 3.5–5.3)
POTASSIUM SERPL-SCNC: 3.5 MMOL/L — SIGNIFICANT CHANGE UP (ref 3.5–5.3)
PROT SERPL-MCNC: 6.1 G/DL — SIGNIFICANT CHANGE UP (ref 6–8.3)
RBC # BLD: 3.53 M/UL — LOW (ref 3.8–5.2)
RBC # FLD: 15.1 % — HIGH (ref 10.3–14.5)
RBC BLD AUTO: SIGNIFICANT CHANGE UP
SMUDGE CELLS # BLD: PRESENT — SIGNIFICANT CHANGE UP
SODIUM SERPL-SCNC: 138 MMOL/L — SIGNIFICANT CHANGE UP (ref 135–145)
WBC # BLD: 6.55 K/UL — SIGNIFICANT CHANGE UP (ref 3.8–10.5)
WBC # FLD AUTO: 6.55 K/UL — SIGNIFICANT CHANGE UP (ref 3.8–10.5)

## 2024-10-06 PROCEDURE — 99233 SBSQ HOSP IP/OBS HIGH 50: CPT

## 2024-10-06 RX ORDER — LIDOCAINE 50 MG/G
1 CREAM TOPICAL ONCE
Refills: 0 | Status: COMPLETED | OUTPATIENT
Start: 2024-10-06 | End: 2024-10-06

## 2024-10-06 RX ORDER — MAG HYDROX/ALUMINUM HYD/SIMETH 200-200-20
30 SUSPENSION, ORAL (FINAL DOSE FORM) ORAL ONCE
Refills: 0 | Status: COMPLETED | OUTPATIENT
Start: 2024-10-06 | End: 2024-10-06

## 2024-10-06 RX ADMIN — Medication 5 MILLILITER(S): at 11:36

## 2024-10-06 RX ADMIN — Medication 10 MILLILITER(S): at 01:01

## 2024-10-06 RX ADMIN — Medication 5 MILLILITER(S): at 16:18

## 2024-10-06 RX ADMIN — Medication 5 MILLILITER(S): at 20:45

## 2024-10-06 RX ADMIN — LIDOCAINE 1 PATCH: 50 CREAM TOPICAL at 01:02

## 2024-10-06 RX ADMIN — Medication 30 MILLILITER(S): at 14:29

## 2024-10-06 RX ADMIN — Medication 5 MILLILITER(S): at 08:08

## 2024-10-06 RX ADMIN — Medication 10 MILLILITER(S): at 08:07

## 2024-10-06 RX ADMIN — Medication 10 MILLIGRAM(S): at 17:48

## 2024-10-06 RX ADMIN — Medication 800 MILLIGRAM(S): at 05:58

## 2024-10-06 RX ADMIN — Medication 800 MILLIGRAM(S): at 17:48

## 2024-10-06 RX ADMIN — Medication 5 MILLILITER(S): at 01:01

## 2024-10-06 RX ADMIN — Medication 10 MILLILITER(S): at 11:36

## 2024-10-06 RX ADMIN — Medication 1 GRAM(S): at 17:49

## 2024-10-06 RX ADMIN — Medication 10 MILLILITER(S): at 16:18

## 2024-10-06 RX ADMIN — LIDOCAINE 1 PATCH: 50 CREAM TOPICAL at 08:06

## 2024-10-06 RX ADMIN — LIDOCAINE 1 PATCH: 50 CREAM TOPICAL at 13:00

## 2024-10-06 RX ADMIN — CHLORHEXIDINE GLUCONATE ORAL RINSE 1 APPLICATION(S): 1.2 SOLUTION DENTAL at 11:37

## 2024-10-06 RX ADMIN — Medication 10 MILLILITER(S): at 20:45

## 2024-10-06 NOTE — PROGRESS NOTE ADULT - SUBJECTIVE AND OBJECTIVE BOX
HPC Transplant Team                                                      Critical / Counseling Time Provided: 30 minutes                                                                                                                                                        Chief Complaint: Autologous peripheral blood stem cell transplant with high dose melphalan (200mg/m2) prep regimen for treatment of multiple myeloma    Disease: MM  Type of Transplant: Autologous  Conditioning Prep: Melphalan (200mg/m2)  ABO/CMV: B pos/CMV-    S: Patient seen and examined with HPC Transplant Team:   +nausea  +dyspepsia    O: rest of ROS negative     O: Vitals:   Vital Signs Last 24 Hrs  T(C): 36.7 (06 Oct 2024 12:46), Max: 37.2 (06 Oct 2024 00:56)  T(F): 98.1 (06 Oct 2024 12:46), Max: 98.9 (06 Oct 2024 00:56)  HR: 91 (06 Oct 2024 12:46) (88 - 91)  BP: 148/83 (06 Oct 2024 12:46) (138/83 - 148/83)  BP(mean): --  RR: 18 (06 Oct 2024 12:46) (18 - 18)  SpO2: 98% (06 Oct 2024 12:46) (98% - 99%)    Parameters below as of 06 Oct 2024 12:46  Patient On (Oxygen Delivery Method): room air        Admit wt:   Daily Weight in k (06 Oct 2024 08:15)    Intake / Output:   10-05 @ :  -  10-06 @ 07:00  --------------------------------------------------------  IN: 2605 mL / OUT: 7700 mL / NET: -5095 mL    10-06 @ 07:  -  10-06 @ 14:03  --------------------------------------------------------  IN: 360 mL / OUT: 800 mL / NET: -440 mL      PE:   Oropharynx: Clear oropharynx thin scant white coating over tongue, no oral lesions  Oral Mucositis:                                                        stGstrstastdstest:st st1st CVS: +S1,S2, reg  Lungs: CTA b/l  Abdomen: +BS, soft, NT/ND  Extremities: no edema BLE's  Gastric Mucositis:                                                  stGstrstastdstest:st st1st Intestinal Mucositis:                                              stGstrstastdstest:st st1st Skin: no rash  TLC: CDI  Neuro: A&O x 3  Pain: 0        Labs:   CBC Full  -  ( 06 Oct 2024 07:21 )  WBC Count : 6.55 K/uL  Hemoglobin : 11.2 g/dL  Hematocrit : 33.7 %  Platelet Count - Automated : 228 K/uL  Mean Cell Volume : 95.5 fl  Mean Cell Hemoglobin : 31.7 pg  Mean Cell Hemoglobin Concentration : 33.2 gm/dL  Auto Neutrophil # : 6.15 K/uL  Auto Lymphocyte # : 0.40 K/uL  Auto Monocyte # : 0.00 K/uL  Auto Eosinophil # : 0.00 K/uL  Auto Basophil # : 0.00 K/uL  Auto Neutrophil % : 93.9 %  Auto Lymphocyte % : 6.1 %  Auto Monocyte % : 0.0 %  Auto Eosinophil % : 0.0 %  Auto Basophil % : 0.0 %                          11.2   6.55  )-----------( 228      ( 06 Oct 2024 07:21 )             33.7     10    138  |  102  |  9   ----------------------------<  88  3.5   |  24  |  0.67    Ca    8.6      06 Oct 2024 07:21  Phos  3.0     10-  Mg     2.2     10-06    TPro  6.1  /  Alb  3.9  /  TBili  1.5[H]  /  DBili  x   /  AST  18  /  ALT  18  /  AlkPhos  75  10-06      LIVER FUNCTIONS - ( 06 Oct 2024 07:21 )  Alb: 3.9 g/dL / Pro: 6.1 g/dL / ALK PHOS: 75 U/L / ALT: 18 U/L / AST: 18 U/L / GGT: x           Lactate Dehydrogenase, Serum: 270 U/L (10-06 @ 07:21)        Cultures: -    Radiology: -        Meds:   Antimicrobials:   acyclovir   Oral Tab/Cap 800 milliGRAM(s) Oral every 12 hours      Heme / Onc:       GI:  aluminum hydroxide/magnesium hydroxide/simethicone Suspension 30 milliLiter(s) Oral once  senna 2 Tablet(s) Oral at bedtime PRN  sodium bicarbonate Mouth Rinse 10 milliLiter(s) Swish and Spit five times a day      Cardiovascular:       Immunologic:       Other medications:   acetaminophen     Tablet .. 650 milliGRAM(s) Oral every 6 hours  Biotene Dry Mouth Oral Rinse 5 milliLiter(s) Swish and Spit five times a day  chlorhexidine 2% Cloths 1 Application(s) Topical <User Schedule>  chlorhexidine 4% Liquid 1 Application(s) Topical <User Schedule>  phytonadione   Solution 5 milliGRAM(s) Oral <User Schedule>  sodium chloride 0.9%. 1000 milliLiter(s) IV Continuous <Continuous>  sodium chloride 0.9%. 500 milliLiter(s) IV Continuous <Continuous>      PRN:   acetaminophen     Tablet .. 650 milliGRAM(s) Oral every 6 hours PRN  prochlorperazine   Injectable 10 milliGRAM(s) IntraMuscular every 6 hours PRN  senna 2 Tablet(s) Oral at bedtime PRN  sodium chloride 0.9% lock flush 10 milliLiter(s) IV Push every 1 hour PRN      58 year old female with recently diagnosed multiple myeloma (3/2024) treated with 5 cycles of Jolene-VRd, admitted for an autologous pbsct with melphalan (200mg/m2) prep regimen.  Today is Day +2      1. Infectious Disease:   Acyclovir 800 bid  Fluconazole to start when ANC < 500 and continue through engraftment    Levaquin to start when ANC < 1000   Bactrim SS for PCP prophylaxis post engraftment    CMV PCR weekly post engraftment through day + 100.    2. GI Prophylaxis:  Protonix    3.Mouthcare - NS / NaHCO3 rinses, Skin care     4. Transfuse & replete electrolytes prn      5. IV hydration, daily weights, strict I&O, prn diuresis      6. PO intake as tolerated, nutrition follow up as needed, MVI, folic acid      7. Antiemetics, anti-diarrhea medications  - Compazine for nausea      8. OOB as tolerated, physical therapy consult if needed      9. Monitor coags 2x week, vitamin K 5mg po BIW (Mon and Thurs)    10. Monitor closely for clinical changes, monitor for fevers     11. Emotional support provided, plan of care discussed and questions addressed     12. Patient education done regarding chemotherapy prep, plan of care, restrictions and discharge planning     13. Continue regular social work input     14. Dyspepsia  - 10/6 patient with "upset stomach" despite compazine for nausea and also receiving a one time dose of zofran overnight. Appetite is decreased due to this feeling, and shes had it for a few days. Abd non tender on palpation, non distended, normoactive bowel sounds. Maalox x 1 dose ordered, if improved dyspepsia consider placing as a PRN. May need to consider adding on additional antiemetics, or additional symptomatic medications such as carafate or pepcid.       I have written the above note for Dr. Stevens who performed service with me in the room.   I have seen and examined patient with PA, I agree with above note as scribed.   Cheyr Suazo PA-C         [facility] Transplant Team                                                      Critical / Counseling Time Provided: 30 minutes                                                                                                                                                        Chief Complaint: Autologous peripheral blood stem cell transplant with high dose melphalan (200mg/m2) prep regimen for treatment of multiple myeloma    Disease: MM  Type of Transplant: Autologous  Conditioning Prep: Melphalan (200mg/m2)  ABO/CMV: B pos/CMV-    S: Patient seen and examined with HPC Transplant Team:   +nausea  +dyspepsia  +complained of pain at port site due to dressing (felt "tight") overnight and then to RN this AM, however dressing was changed yesterday and per RN Treva someone came to remove an extra suture from the port site. Upon provider meeting with patient, no longer complaining for port site/dressing discomfort.    O: rest of ROS negative     O: Vitals:   Vital Signs Last 24 Hrs  T(C): 36.7 (06 Oct 2024 12:46), Max: 37.2 (06 Oct 2024 00:56)  T(F): 98.1 (06 Oct 2024 12:46), Max: 98.9 (06 Oct 2024 00:56)  HR: 91 (06 Oct 2024 12:46) (88 - 91)  BP: 148/83 (06 Oct 2024 12:46) (138/83 - 148/83)  BP(mean): --  RR: 18 (06 Oct 2024 12:46) (18 - 18)  SpO2: 98% (06 Oct 2024 12:46) (98% - 99%)    Parameters below as of 06 Oct 2024 12:46  Patient On (Oxygen Delivery Method): room air        Admit wt: 58.9 kg (10/3/2024)  Daily Weight in k (06 Oct 2024 08:15)      Intake / Output:   10-05 @ :01  -  10-06 @ 07:00  --------------------------------------------------------  IN: 2605 mL / OUT: 7700 mL / NET: -5095 mL    10-06 @ 07:01  -  10-06 @ 14:03  --------------------------------------------------------  IN: 360 mL / OUT: 800 mL / NET: -440 mL        PE:   Oropharynx: Clear oropharynx thin scant white coating over tongue, no oral lesions  Oral Mucositis:                                                        stGstrstastdstest:st st1st CVS: +S1,S2, reg  Lungs: CTA b/l  Abdomen: +BS, soft, NT/ND  Extremities: no edema BLE's  Gastric Mucositis:                                                  stGstrstastdstest:st st1st Intestinal Mucositis:                                              stGstrstastdstest:st st1st Skin: no rash  TLC: CDI  Neuro: A&O x 3  Pain: 0        Labs:   CBC Full  -  ( 06 Oct 2024 07:21 )  WBC Count : 6.55 K/uL  Hemoglobin : 11.2 g/dL  Hematocrit : 33.7 %  Platelet Count - Automated : 228 K/uL  Mean Cell Volume : 95.5 fl  Mean Cell Hemoglobin : 31.7 pg  Mean Cell Hemoglobin Concentration : 33.2 gm/dL  Auto Neutrophil # : 6.15 K/uL  Auto Lymphocyte # : 0.40 K/uL  Auto Monocyte # : 0.00 K/uL  Auto Eosinophil # : 0.00 K/uL  Auto Basophil # : 0.00 K/uL  Auto Neutrophil % : 93.9 %  Auto Lymphocyte % : 6.1 %  Auto Monocyte % : 0.0 %  Auto Eosinophil % : 0.0 %  Auto Basophil % : 0.0 %                          11.2   6.55  )-----------( 228      ( 06 Oct 2024 07:21 )             33.7     10-    138  |  102  |  9   ----------------------------<  88  3.5   |  24  |  0.67    Ca    8.6      06 Oct 2024 07:21  Phos  3.0     10-06  Mg     2.2     10-06    TPro  6.1  /  Alb  3.9  /  TBili  1.5[H]  /  DBili  x   /  AST  18  /  ALT  18  /  AlkPhos  75  10-06      LIVER FUNCTIONS - ( 06 Oct 2024 07:21 )  Alb: 3.9 g/dL / Pro: 6.1 g/dL / ALK PHOS: 75 U/L / ALT: 18 U/L / AST: 18 U/L / GGT: x           Lactate Dehydrogenase, Serum: 270 U/L (10-06 @ 07:21)        Cultures: -    Radiology: -        Meds:   Antimicrobials:   acyclovir   Oral Tab/Cap 800 milliGRAM(s) Oral every 12 hours      Heme / Onc:       GI:  aluminum hydroxide/magnesium hydroxide/simethicone Suspension 30 milliLiter(s) Oral once  senna 2 Tablet(s) Oral at bedtime PRN  sodium bicarbonate Mouth Rinse 10 milliLiter(s) Swish and Spit five times a day      Cardiovascular:       Immunologic:       Other medications:   acetaminophen     Tablet .. 650 milliGRAM(s) Oral every 6 hours  Biotene Dry Mouth Oral Rinse 5 milliLiter(s) Swish and Spit five times a day  chlorhexidine 2% Cloths 1 Application(s) Topical <User Schedule>  chlorhexidine 4% Liquid 1 Application(s) Topical <User Schedule>  phytonadione   Solution 5 milliGRAM(s) Oral <User Schedule>  sodium chloride 0.9%. 1000 milliLiter(s) IV Continuous <Continuous>  sodium chloride 0.9%. 500 milliLiter(s) IV Continuous <Continuous>      PRN:   acetaminophen     Tablet .. 650 milliGRAM(s) Oral every 6 hours PRN  prochlorperazine   Injectable 10 milliGRAM(s) IntraMuscular every 6 hours PRN  senna 2 Tablet(s) Oral at bedtime PRN  sodium chloride 0.9% lock flush 10 milliLiter(s) IV Push every 1 hour PRN      58 year old female with recently diagnosed multiple myeloma (3/2024) treated with 5 cycles of Jolene-VRd, admitted for an autologous pbsct with melphalan (200mg/m2) prep regimen.  Today is Day +2      1. Infectious Disease:   Acyclovir 800 bid  Fluconazole to start when ANC < 500 and continue through engraftment    Levaquin to start when ANC < 1000   Bactrim SS for PCP prophylaxis post engraftment    CMV PCR weekly post engraftment through day + 100.    2. GI Prophylaxis:  Protonix    3.Mouthcare - NS / NaHCO3 rinses, Skin care     4. Transfuse & replete electrolytes prn      5. IV hydration, daily weights, strict I&O, prn diuresis      6. PO intake as tolerated, nutrition follow up as needed, MVI, folic acid      7. Antiemetics, anti-diarrhea medications  - Compazine for nausea      8. OOB as tolerated, physical therapy consult if needed      9. Monitor coags 2x week, vitamin K 5mg po BIW (Mon and urs)    10. Monitor closely for clinical changes, monitor for fevers     11. Emotional support provided, plan of care discussed and questions addressed     12. Patient education done regarding chemotherapy prep, plan of care, restrictions and discharge planning     13. Continue regular social work input     14. Dyspepsia  - 10/6 patient with "upset stomach" despite compazine for nausea and also receiving a one time dose of zofran overnight. Appetite is decreased due to this feeling, and shes had it for a few days. Abd non tender on palpation, non distended, normoactive bowel sounds. Maalox x 1 dose ordered, if improved dyspepsia consider placing as a PRN. May need to consider adding on additional antiemetics, or additional symptomatic medications such as carafate or pepcid.       I have written the above note for Dr. Stevens who performed service with me in the room.   I have seen and examined patient with PA, I agree with above note as scribed.   Chery Suazo PA-C

## 2024-10-06 NOTE — PROGRESS NOTE ADULT - NS ATTEND AMEND GEN_ALL_CORE FT
58 year old female with recently diagnosed multiple myeloma (3/2024) treated with 5 cycles of Jolene-VRd, admitted for an autologous pbsct with melphalan (200mg/m2) prep regimen  1. Admit to BMTU on day 1..mild nausea  ....getting post infusion hydration..weight noted  2. TLC placement in IR    3. Melphalan hydration: start NS at 250ml / hr for 2 hours before and 2 hours post melphalan infusion    4. On day -1;  melphalan 200mg/m2 IV once administered over 30 minutes; initiate cryotherapy (one tbsp of ice in the mouth constantly) 15 minutes before, during and following the melphalan infusion    5. Stem cell infusion on day 0    6. Start zarxio 5mcg/kg/day daily on day + 5.  1. Antiviral prophylaxis with acyclovir 800 po BID to start on admission    2. Fluconazole to start when ANC < 500 and continue through engraftment    3. Levaquin to start when ANC < 1000   4. Bactrim SS for PCP prophylaxis post engraftment    5. CMV PCR weekly post engraftment through day + 100  6. transfuse as per guidelines  7. follow i/o, replete lytes prn  8. mouth and skin care 58 year old female with recently diagnosed multiple myeloma (3/2024) treated with 5 cycles of Jolene-VRd, admitted for an autologous pbsct with melphalan (200mg/m2) prep regimen  1. Admit to BMTU on day 2..mild nausea  ....completed post infusion hydration..weight noted, improved  2. TLC placement in IR    3. Melphalan hydration: start NS at 250ml / hr for 2 hours before and 2 hours post melphalan infusion    4. On day -1;  melphalan 200mg/m2 IV once administered over 30 minutes; initiate cryotherapy (one tbsp of ice in the mouth constantly) 15 minutes before, during and following the melphalan infusion    5. Stem cell infusion on day 0    6. Start zarxio 5mcg/kg/day daily on day + 5.  1. Antiviral prophylaxis with acyclovir 800 po BID to start on admission    2. Fluconazole to start when ANC < 500 and continue through engraftment    3. Levaquin to start when ANC < 1000   4. Bactrim SS for PCP prophylaxis post engraftment    5. CMV PCR weekly post engraftment through day + 100  6. transfuse as per guidelines  7. follow i/o, replete lytes prn  8. mouth and skin care

## 2024-10-07 LAB
ALBUMIN SERPL ELPH-MCNC: 4 G/DL — SIGNIFICANT CHANGE UP (ref 3.3–5)
ALP SERPL-CCNC: 73 U/L — SIGNIFICANT CHANGE UP (ref 40–120)
ALT FLD-CCNC: 17 U/L — SIGNIFICANT CHANGE UP (ref 10–45)
ANION GAP SERPL CALC-SCNC: 12 MMOL/L — SIGNIFICANT CHANGE UP (ref 5–17)
APTT BLD: 27.3 SEC — SIGNIFICANT CHANGE UP (ref 24.5–35.6)
AST SERPL-CCNC: 17 U/L — SIGNIFICANT CHANGE UP (ref 10–40)
BASOPHILS # BLD AUTO: 0.01 K/UL — SIGNIFICANT CHANGE UP (ref 0–0.2)
BASOPHILS NFR BLD AUTO: 0.2 % — SIGNIFICANT CHANGE UP (ref 0–2)
BILIRUB DIRECT SERPL-MCNC: 0.2 MG/DL — SIGNIFICANT CHANGE UP (ref 0–0.3)
BILIRUB INDIRECT FLD-MCNC: 1.3 MG/DL — HIGH (ref 0.2–1)
BILIRUB SERPL-MCNC: 1.5 MG/DL — HIGH (ref 0.2–1.2)
BLD GP AB SCN SERPL QL: POSITIVE — SIGNIFICANT CHANGE UP
BUN SERPL-MCNC: 10 MG/DL — SIGNIFICANT CHANGE UP (ref 7–23)
CALCIUM SERPL-MCNC: 9 MG/DL — SIGNIFICANT CHANGE UP (ref 8.4–10.5)
CHLORIDE SERPL-SCNC: 101 MMOL/L — SIGNIFICANT CHANGE UP (ref 96–108)
CO2 SERPL-SCNC: 24 MMOL/L — SIGNIFICANT CHANGE UP (ref 22–31)
CREAT SERPL-MCNC: 0.68 MG/DL — SIGNIFICANT CHANGE UP (ref 0.5–1.3)
EGFR: 100 ML/MIN/1.73M2 — SIGNIFICANT CHANGE UP
EOSINOPHIL # BLD AUTO: 0.02 K/UL — SIGNIFICANT CHANGE UP (ref 0–0.5)
EOSINOPHIL NFR BLD AUTO: 0.3 % — SIGNIFICANT CHANGE UP (ref 0–6)
G6PD RBC-CCNC: 8.1 U/G HGB — SIGNIFICANT CHANGE UP (ref 7–20.5)
GLUCOSE SERPL-MCNC: 101 MG/DL — HIGH (ref 70–99)
HCT VFR BLD CALC: 33 % — LOW (ref 34.5–45)
HGB BLD-MCNC: 11 G/DL — LOW (ref 11.5–15.5)
IMM GRANULOCYTES NFR BLD AUTO: 0.2 % — SIGNIFICANT CHANGE UP (ref 0–0.9)
INR BLD: 1 RATIO — SIGNIFICANT CHANGE UP (ref 0.85–1.16)
LDH SERPL L TO P-CCNC: 217 U/L — SIGNIFICANT CHANGE UP (ref 50–242)
LYMPHOCYTES # BLD AUTO: 0.4 K/UL — LOW (ref 1–3.3)
LYMPHOCYTES # BLD AUTO: 6.7 % — LOW (ref 13–44)
MAGNESIUM SERPL-MCNC: 2.3 MG/DL — SIGNIFICANT CHANGE UP (ref 1.6–2.6)
MCHC RBC-ENTMCNC: 31.7 PG — SIGNIFICANT CHANGE UP (ref 27–34)
MCHC RBC-ENTMCNC: 33.3 GM/DL — SIGNIFICANT CHANGE UP (ref 32–36)
MCV RBC AUTO: 95.1 FL — SIGNIFICANT CHANGE UP (ref 80–100)
MONOCYTES # BLD AUTO: 0.01 K/UL — SIGNIFICANT CHANGE UP (ref 0–0.9)
MONOCYTES NFR BLD AUTO: 0.2 % — LOW (ref 2–14)
NEUTROPHILS # BLD AUTO: 5.52 K/UL — SIGNIFICANT CHANGE UP (ref 1.8–7.4)
NEUTROPHILS NFR BLD AUTO: 92.4 % — HIGH (ref 43–77)
NRBC # BLD: 0 /100 WBCS — SIGNIFICANT CHANGE UP (ref 0–0)
PHOSPHATE SERPL-MCNC: 3.3 MG/DL — SIGNIFICANT CHANGE UP (ref 2.5–4.5)
PLATELET # BLD AUTO: 223 K/UL — SIGNIFICANT CHANGE UP (ref 150–400)
POTASSIUM SERPL-MCNC: 3.3 MMOL/L — LOW (ref 3.5–5.3)
POTASSIUM SERPL-SCNC: 3.3 MMOL/L — LOW (ref 3.5–5.3)
PROT SERPL-MCNC: 5.9 G/DL — LOW (ref 6–8.3)
PROTHROM AB SERPL-ACNC: 11.4 SEC — SIGNIFICANT CHANGE UP (ref 9.9–13.4)
RBC # BLD: 3.47 M/UL — LOW (ref 3.8–5.2)
RBC # FLD: 14.6 % — HIGH (ref 10.3–14.5)
RH IG SCN BLD-IMP: POSITIVE — SIGNIFICANT CHANGE UP
SODIUM SERPL-SCNC: 137 MMOL/L — SIGNIFICANT CHANGE UP (ref 135–145)
WBC # BLD: 5.97 K/UL — SIGNIFICANT CHANGE UP (ref 3.8–10.5)
WBC # FLD AUTO: 5.97 K/UL — SIGNIFICANT CHANGE UP (ref 3.8–10.5)

## 2024-10-07 RX ORDER — PROCHLORPERAZINE MALEATE 5 MG
10 TABLET ORAL EVERY 6 HOURS
Refills: 0 | Status: DISCONTINUED | OUTPATIENT
Start: 2024-10-07 | End: 2024-10-16

## 2024-10-07 RX ORDER — PANTOPRAZOLE SODIUM 40 MG/1
40 TABLET, DELAYED RELEASE ORAL
Refills: 0 | Status: DISCONTINUED | OUTPATIENT
Start: 2024-10-07 | End: 2024-10-16

## 2024-10-07 RX ADMIN — Medication 10 MILLILITER(S): at 08:04

## 2024-10-07 RX ADMIN — Medication 10 MILLILITER(S): at 17:45

## 2024-10-07 RX ADMIN — CHLORHEXIDINE GLUCONATE ORAL RINSE 1 APPLICATION(S): 1.2 SOLUTION DENTAL at 08:04

## 2024-10-07 RX ADMIN — Medication 5 MILLILITER(S): at 12:38

## 2024-10-07 RX ADMIN — Medication 10 MILLILITER(S): at 00:13

## 2024-10-07 RX ADMIN — Medication 5 MILLILITER(S): at 00:13

## 2024-10-07 RX ADMIN — Medication 800 MILLIGRAM(S): at 05:51

## 2024-10-07 RX ADMIN — Medication 2 TABLET(S): at 21:47

## 2024-10-07 RX ADMIN — Medication 5 MILLILITER(S): at 16:00

## 2024-10-07 RX ADMIN — Medication 50 MILLIEQUIVALENT(S): at 09:53

## 2024-10-07 RX ADMIN — Medication 5 MILLIGRAM(S): at 09:53

## 2024-10-07 RX ADMIN — Medication 10 MILLILITER(S): at 20:38

## 2024-10-07 RX ADMIN — Medication 50 MILLIEQUIVALENT(S): at 12:36

## 2024-10-07 RX ADMIN — Medication 5 MILLILITER(S): at 20:40

## 2024-10-07 RX ADMIN — Medication 800 MILLIGRAM(S): at 17:41

## 2024-10-07 RX ADMIN — Medication 10 MILLILITER(S): at 12:35

## 2024-10-07 RX ADMIN — Medication 50 MILLIEQUIVALENT(S): at 15:07

## 2024-10-07 RX ADMIN — Medication 5 MILLILITER(S): at 08:04

## 2024-10-07 NOTE — CHART NOTE - NSCHARTNOTEFT_GEN_A_CORE
NUTRITION FOLLOW UP NOTE    PATIENT SEEN FOR: BMTU Nutrition Follow up     SOURCE: [x] Patient  [x] Current Medical Record  [x] RN  [] Family/support person at bedside  [] Patient unavailable/inappropriate  [x] Other: Interdisciplinary Rounds     CHART REVIEWED/EVENTS NOTED.  [] No changes to nutrition care plan to note  [x] Nutrition Status: Admitted for an autologous pbsct with melphalan (200mg/m2) prep regimen. Transplant Day: 10/04/2024.  Today is Day + 3    DIET ORDER:   Diet, Regular (10-03-24)      CURRENT DIET ORDER IS:  [x] Appropriate:  [] Inadequate:  [] Other:    NUTRITION INTAKE/PROVISION:  [x] PO: Pt reports appetite/PO intake; consuming % of most meals.   [] Enteral Nutrition:  [] Parenteral Nutrition:    ANTHROPOMETRICS:  Drug Dosing Weight  Height (cm): 156 (03 Oct 2024 07:37)  Weight (kg): 58.9 (03 Oct 2024 07:37)  BMI (kg/m2): 24.2 (03 Oct 2024 07:37)  BSA (m2): 1.58 (03 Oct 2024 07:37)  Weights:   Daily Weight in k.3 (10-07), Weight in k (10-06), Weight in k.1 (10-05), Weight in k.2 (10-04)   ** Weight fluctuating - Weight changes likely secondary to fluid shifts. RD to continue to monitor weight trends as able.     NUTRITIONALLY PERTINENT MEDICATIONS:  MEDICATIONS  (STANDING):  acyclovir   Oral Tab/Cap  pantoprazole    Tablet  phytonadione   Solution  potassium chloride  20 mEq/100 mL IVPB  sodium bicarbonate Mouth Rinse  sodium chloride 0.9%.  sodium chloride 0.9%.       NUTRITIONALLY PERTINENT LABS:  10-07 Na137 mmol/L Glu 101 mg/dL[H] K+ 3.3 mmol/L[L] Cr  0.68 mg/dL BUN 10 mg/dL 10-07 Phos 3.3 mg/dL 10-07 Alb 4.0 g/dL10-07 ALT 17 U/L AST 17 U/L Alkaline Phosphatase 73 U/L            Finger Sticks:      NUTRITIONALLY PERTINENT MEDICATIONS/LABS:  [x] Reviewed  [] Relevant notes on medications/labs:    EDEMA:  [x] Reviewed  [] Relevant notes:    GI/ I&O:  [x] Reviewed  [] Relevant notes:  [] Other:    SKIN:   [x] No pressure injuries documented, per nursing flowsheet  [] Pressure injury previously noted  [] Change in pressure injury documentation:  [] Other:    ESTIMATED NEEDS:  [] No change:  [] Updated:  Energy: 7271-2521  kcal/day (30-35 kcal/kg)  Protein: 81.9-93.6  g/day (1.4-1.6 g/kg)  Fluid:   ml/day or [x] defer to team  Based on: dosing weight 58.5 kg     NUTRITION DIAGNOSIS:  [x] Prior Dx: Increased Nutrient Needs, Predicted Inadequate protein-energy intake  [] New Dx:    EDUCATION:  [x] Yes: Emphasized the importance of adequate kcal and protein intake, provided recommendations to optimize nutritional intake in case of decreased appetite, recommended small frequent meals by consuming nutrient-dense snacks between meals, to start with protein, and sips of supplement throughout the day.   [] Not appropriate/warranted    NUTRITION CARE PLAN:  1. Diet: regular diet   2. Supplements:  3. Monitor and encourage PO intake. Encourage use of daily menus. Honor dietary preferences as expressed as able.   4:     [] Achieved - Continue current nutrition intervention(s)  [] Current medical condition precludes nutrition intervention at this time.    MONITORING AND EVALUATION:   RD remains available upon request and will follow up per protocol.    Lashanda Bach RDN CDN (TEAMS) NUTRITION FOLLOW UP NOTE    PATIENT SEEN FOR: BMTU Nutrition Follow up     SOURCE: [x] Patient  [x] Current Medical Record  [x] RN  [] Family/support person at bedside  [] Patient unavailable/inappropriate  [x] Other: Interdisciplinary Rounds     CHART REVIEWED/EVENTS NOTED.  [] No changes to nutrition care plan to note  [x] Nutrition Status: Admitted for an autologous pbsct with melphalan (200mg/m2) prep regimen. Transplant Day: 10/04/2024. Today is Day + 3    DIET ORDER:   Diet, Regular (10-03-24)      CURRENT DIET ORDER IS:  [x] Appropriate:  [] Inadequate:  [] Other:    NUTRITION INTAKE/PROVISION:  [x] PO: Pt reports good appetite/PO intake; consuming >75% of most meals. Is not amenable to receiving oral nutritional supplements at this time.   [] Enteral Nutrition:  [] Parenteral Nutrition:    ANTHROPOMETRICS:  Drug Dosing Weight  Height (cm): 156 (03 Oct 2024 07:37)  Weight (kg): 58.9 (03 Oct 2024 07:37)  BMI (kg/m2): 24.2 (03 Oct 2024 07:37)  BSA (m2): 1.58 (03 Oct 2024 07:37)  Weights:   Daily Weight in k.3 (10-07), Weight in k (10-06), Weight in k.1 (10-05), Weight in k.2 (10-04)   ** Weight fluctuating - Weight changes likely secondary to fluid shifts. RD to continue to monitor weight trends as able.     NUTRITIONALLY PERTINENT MEDICATIONS:  MEDICATIONS  (STANDING):  acyclovir   Oral Tab/Cap  pantoprazole    Tablet  phytonadione   Solution  potassium chloride  20 mEq/100 mL IVPB  sodium bicarbonate Mouth Rinse  sodium chloride 0.9%.  sodium chloride 0.9%.       NUTRITIONALLY PERTINENT LABS:  10-07 Na137 mmol/L Glu 101 mg/dL[H] K+ 3.3 mmol/L[L] Cr  0.68 mg/dL BUN 10 mg/dL 10-07 Phos 3.3 mg/dL 10-07 Alb 4.0 g/dL10-07 ALT 17 U/L AST 17 U/L Alkaline Phosphatase 73 U/L            Finger Sticks:      NUTRITIONALLY PERTINENT MEDICATIONS/LABS:  [x] Reviewed  [] Relevant notes on medications/labs:    EDEMA:  [x] Reviewed  [] Relevant notes:    GI/ I&O:  [x] Reviewed  [] Relevant notes:  [] Other:    SKIN:   [x] No pressure injuries documented, per nursing flowsheet  [] Pressure injury previously noted  [] Change in pressure injury documentation:  [] Other:    ESTIMATED NEEDS:  [x] No change:  [] Updated:  Energy: 0137-6266  kcal/day (30-35 kcal/kg)  Protein: 81.9-93.6  g/day (1.4-1.6 g/kg)  Fluid:   ml/day or [x] defer to team  Based on: dosing weight 58.5 kg     NUTRITION DIAGNOSIS:  [x] Prior Dx: Increased Nutrient Needs, Predicted Inadequate protein-energy intake  [] New Dx:    EDUCATION:  [x] Yes: Emphasized the importance of adequate kcal and protein intake, provided recommendations to optimize nutritional intake in case of decreased appetite, recommended small frequent meals by consuming nutrient-dense snacks between meals, to start with protein, and sips of supplement throughout the day.   [] Not appropriate/warranted    NUTRITION CARE PLAN:  1. Diet: regular diet   2. Supplements: Pt is not amenable to receiving oral nutritional supplements at this time.   3. Monitor and encourage PO intake. Encourage use of daily menus. Honor dietary preferences as expressed as able.     [] Achieved - Continue current nutrition intervention(s)  [] Current medical condition precludes nutrition intervention at this time.    MONITORING AND EVALUATION:   RD remains available upon request and will follow up per protocol.    Lashanda Bach RDN CDN (TEAMS)

## 2024-10-07 NOTE — PROGRESS NOTE ADULT - SUBJECTIVE AND OBJECTIVE BOX
HPC Transplant Team                                                      Critical / Counseling Time Provided: 30 minutes                                                                                                                                                        Chief Complaint: Autologous peripheral blood stem cell transplant with high dose melphalan (200mg/m2) prep regimen for treatment of multiple myeloma    S: Patient seen and examined with HPC Transplant Team:     Disease: MM  Type of Transplant: Autologous  Conditioning Prep: Melphalan (200mg/m2)  ABO/CMV: B pos/CMV-    O: Vitals:   Vital Signs Last 24 Hrs  T(C): 37.1 (07 Oct 2024 06:10), Max: 37.1 (07 Oct 2024 06:10)  T(F): 98.8 (07 Oct 2024 06:10), Max: 98.8 (07 Oct 2024 06:10)  HR: 88 (07 Oct 2024 06:10) (88 - 105)  BP: 147/84 (07 Oct 2024 06:10) (134/71 - 154/89)  BP(mean): --  RR: 18 (07 Oct 2024 06:10) (18 - 18)  SpO2: 99% (07 Oct 2024 06:10) (98% - 99%)    Parameters below as of 07 Oct 2024 06:10  Patient On (Oxygen Delivery Method): room air        Admit weight: 58.9kg     Intake / Output:   10-06 @ 07:01  -  10-07 @ 07:00  --------------------------------------------------------  IN: 1060 mL / OUT: 2300 mL / NET: -1240 mL      PE:   Oropharynx: Clear oropharynx thin scant white coating over tongue, no oral lesions  Oral Mucositis:                                                        stGstrstastdstest:st st1st CVS: +S1,S2, reg  Lungs: CTA b/l  Abdomen: +BS, soft, NT/ND  Extremities: no edema BLE's  Gastric Mucositis:                                                  stGstrstastdstest:st st1st Intestinal Mucositis:                                              stGstrstastdstest:st st1st Skin: no rash  TLC: CDI  Neuro: A&O x 3  Pain: 0      Labs:   CBC Full  -  ( 07 Oct 2024 07:24 )  WBC Count : 5.97 K/uL  Hemoglobin : 11.0 g/dL  Hematocrit : 33.0 %  Platelet Count - Automated : 223 K/uL  Mean Cell Volume : 95.1 fl  Mean Cell Hemoglobin : 31.7 pg  Mean Cell Hemoglobin Concentration : 33.3 gm/dL  Auto Neutrophil # : 5.52 K/uL  Auto Lymphocyte # : 0.40 K/uL  Auto Monocyte # : 0.01 K/uL  Auto Eosinophil # : 0.02 K/uL  Auto Basophil # : 0.01 K/uL  Auto Neutrophil % : 92.4 %  Auto Lymphocyte % : 6.7 %  Auto Monocyte % : 0.2 %  Auto Eosinophil % : 0.3 %  Auto Basophil % : 0.2 %                          11.0   5.97  )-----------( 223      ( 07 Oct 2024 07:24 )             33.0     10-07    137  |  101  |  10  ----------------------------<  101[H]  3.3[L]   |  24  |  0.68    Ca    9.0      07 Oct 2024 07:28  Phos  3.3     10-07  Mg     2.3     10-07    TPro  5.9[L]  /  Alb  4.0  /  TBili  1.5[H]  /  DBili  0.2  /  AST  17  /  ALT  17  /  AlkPhos  73  10-07    PT/INR - ( 07 Oct 2024 07:26 )   PT: 11.4 sec;   INR: 1.00 ratio         PTT - ( 07 Oct 2024 07:26 )  PTT:27.3 sec  LIVER FUNCTIONS - ( 07 Oct 2024 07:28 )  Alb: 4.0 g/dL / Pro: 5.9 g/dL / ALK PHOS: 73 U/L / ALT: 17 U/L / AST: 17 U/L / GGT: x           Lactate Dehydrogenase, Serum: 217 U/L (10-07 @ 07:28)        Cultures:   Culture Results:   No growth at 72 Hours (10.03.24 @ 14:59)    Culture Results:   No growth at 72 Hours (10.03.24 @ 10:45)          Meds:   Antimicrobials:   acyclovir   Oral Tab/Cap 800 milliGRAM(s) Oral every 12 hours      Heme / Onc:       GI:  senna 2 Tablet(s) Oral at bedtime PRN  sodium bicarbonate Mouth Rinse 10 milliLiter(s) Swish and Spit five times a day      Cardiovascular:       Immunologic:       Other medications:   acetaminophen     Tablet .. 650 milliGRAM(s) Oral every 6 hours  Biotene Dry Mouth Oral Rinse 5 milliLiter(s) Swish and Spit five times a day  chlorhexidine 2% Cloths 1 Application(s) Topical <User Schedule>  chlorhexidine 4% Liquid 1 Application(s) Topical <User Schedule>  phytonadione   Solution 5 milliGRAM(s) Oral <User Schedule>  potassium chloride  20 mEq/100 mL IVPB 20 milliEquivalent(s) IV Intermittent every 2 hours  sodium chloride 0.9%. 1000 milliLiter(s) IV Continuous <Continuous>  sodium chloride 0.9%. 500 milliLiter(s) IV Continuous <Continuous>      PRN:   acetaminophen     Tablet .. 650 milliGRAM(s) Oral every 6 hours PRN  prochlorperazine   Injectable 10 milliGRAM(s) IntraMuscular every 6 hours PRN  senna 2 Tablet(s) Oral at bedtime PRN  sodium chloride 0.9% lock flush 10 milliLiter(s) IV Push every 1 hour PRN    A/P: 58 year old female with recently diagnosed multiple myeloma (3/2024) treated with 5 cycles of Jolene-VRd, admitted for an autologous pbsct with melphalan (200mg/m2) prep regimen.  Today is Day + 3    1. Infectious Disease:   Acyclovir 800 bid  Fluconazole to start when ANC < 500 and continue through engraftment    Levaquin to start when ANC < 1000   Bactrim SS for PCP prophylaxis post engraftment    CMV PCR weekly post engraftment through day + 100.    2. GI Prophylaxis:    Protonix 40mg po QD     3.Mouthcare - NS / NaHCO3 rinses, Skin care     4. Transfuse & replete electrolytes prn   potassium chloride  20 mEq/100 mL IVPB 20 milliEquivalent(s) IV Intermittent every 2 hours    5. IV hydration, daily weights, strict I&O, prn diuresis     6. PO intake as tolerated, nutrition follow up as needed, MVI, folic acid     7. Antiemetics, anti-diarrhea medications  prochlorperazine   Injectable 10 milliGRAM(s) IntraMuscular every 6 hours PRN    8. OOB as tolerated, physical therapy consult if needed     9. Monitor coags 2x week, vitamin K 5mg po BIW (Mon and Thurs)    10. Monitor closely for clinical changes, monitor for fevers     11. Emotional support provided, plan of care discussed and questions addressed     12. Patient education done regarding plan of care, restrictions and discharge planning     13. Continue regular social work input     I have written the above note for Dr. Liz who performed service with me in the room.   I have seen and examined patient with NP, I agree with above note as scribed. Aziza TRINIDADC   HPC Transplant Team                                                      Critical / Counseling Time Provided: 30 minutes                                                                                                                                                        Chief Complaint: Autologous peripheral blood stem cell transplant with high dose melphalan (200mg/m2) prep regimen for treatment of multiple myeloma    S: Patient seen and examined with HPC Transplant Team:     Disease: MM  Type of Transplant: Autologous  Conditioning Prep: Melphalan (200mg/m2)  ABO/CMV: B pos/CMV-    O: Vitals:   Vital Signs Last 24 Hrs  T(C): 37.1 (07 Oct 2024 06:10), Max: 37.1 (07 Oct 2024 06:10)  T(F): 98.8 (07 Oct 2024 06:10), Max: 98.8 (07 Oct 2024 06:10)  HR: 88 (07 Oct 2024 06:10) (88 - 105)  BP: 147/84 (07 Oct 2024 06:10) (134/71 - 154/89)  BP(mean): --  RR: 18 (07 Oct 2024 06:10) (18 - 18)  SpO2: 99% (07 Oct 2024 06:10) (98% - 99%)    Parameters below as of 07 Oct 2024 06:10  Patient On (Oxygen Delivery Method): room air        Admit weight: 58.9kg     Intake / Output:   10-06 @ 07:01  -  10-07 @ 07:00  --------------------------------------------------------  IN: 1060 mL / OUT: 2300 mL / NET: -1240 mL      PE:   Oropharynx: No erythema or ulcerations   Oral Mucositis:                                                        stGstrstastdstest:st st1st CVS: +S1,S2, RRR   Lungs: CTA b/l  Abdomen: +BS x 3, soft, NT/ND  Extremities: no edema BLE's  Gastric Mucositis:                                                  stGstrstastdstest:st st1st Intestinal Mucositis:                                              stGstrstastdstest:st st1st Skin: no rash  TLC: CDI  Neuro: A&O x 3  Pain: 0      Labs:   CBC Full  -  ( 07 Oct 2024 07:24 )  WBC Count : 5.97 K/uL  Hemoglobin : 11.0 g/dL  Hematocrit : 33.0 %  Platelet Count - Automated : 223 K/uL  Mean Cell Volume : 95.1 fl  Mean Cell Hemoglobin : 31.7 pg  Mean Cell Hemoglobin Concentration : 33.3 gm/dL  Auto Neutrophil # : 5.52 K/uL  Auto Lymphocyte # : 0.40 K/uL  Auto Monocyte # : 0.01 K/uL  Auto Eosinophil # : 0.02 K/uL  Auto Basophil # : 0.01 K/uL  Auto Neutrophil % : 92.4 %  Auto Lymphocyte % : 6.7 %  Auto Monocyte % : 0.2 %  Auto Eosinophil % : 0.3 %  Auto Basophil % : 0.2 %                          11.0   5.97  )-----------( 223      ( 07 Oct 2024 07:24 )             33.0     10-07    137  |  101  |  10  ----------------------------<  101[H]  3.3[L]   |  24  |  0.68    Ca    9.0      07 Oct 2024 07:28  Phos  3.3     10-07  Mg     2.3     10-07    TPro  5.9[L]  /  Alb  4.0  /  TBili  1.5[H]  /  DBili  0.2  /  AST  17  /  ALT  17  /  AlkPhos  73  10-07    PT/INR - ( 07 Oct 2024 07:26 )   PT: 11.4 sec;   INR: 1.00 ratio         PTT - ( 07 Oct 2024 07:26 )  PTT:27.3 sec  LIVER FUNCTIONS - ( 07 Oct 2024 07:28 )  Alb: 4.0 g/dL / Pro: 5.9 g/dL / ALK PHOS: 73 U/L / ALT: 17 U/L / AST: 17 U/L / GGT: x           Lactate Dehydrogenase, Serum: 217 U/L (10-07 @ 07:28)        Cultures:   Culture Results:   No growth at 72 Hours (10.03.24 @ 14:59)    Culture Results:   No growth at 72 Hours (10.03.24 @ 10:45)          Meds:   Antimicrobials:   acyclovir   Oral Tab/Cap 800 milliGRAM(s) Oral every 12 hours      Heme / Onc:       GI:  senna 2 Tablet(s) Oral at bedtime PRN  sodium bicarbonate Mouth Rinse 10 milliLiter(s) Swish and Spit five times a day      Cardiovascular:       Immunologic:       Other medications:   acetaminophen     Tablet .. 650 milliGRAM(s) Oral every 6 hours  Biotene Dry Mouth Oral Rinse 5 milliLiter(s) Swish and Spit five times a day  chlorhexidine 2% Cloths 1 Application(s) Topical <User Schedule>  chlorhexidine 4% Liquid 1 Application(s) Topical <User Schedule>  phytonadione   Solution 5 milliGRAM(s) Oral <User Schedule>  potassium chloride  20 mEq/100 mL IVPB 20 milliEquivalent(s) IV Intermittent every 2 hours  sodium chloride 0.9%. 1000 milliLiter(s) IV Continuous <Continuous>  sodium chloride 0.9%. 500 milliLiter(s) IV Continuous <Continuous>      PRN:   acetaminophen     Tablet .. 650 milliGRAM(s) Oral every 6 hours PRN  prochlorperazine   Injectable 10 milliGRAM(s) IntraMuscular every 6 hours PRN  senna 2 Tablet(s) Oral at bedtime PRN  sodium chloride 0.9% lock flush 10 milliLiter(s) IV Push every 1 hour PRN    A/P: 58 year old female with recently diagnosed multiple myeloma (3/2024) treated with 5 cycles of Jolene-VRd, admitted for an autologous pbsct with melphalan (200mg/m2) prep regimen.  Today is Day + 3    1. Infectious Disease:   Acyclovir 800 bid  Fluconazole to start when ANC < 500 and continue through engraftment    Levaquin to start when ANC < 1000   Bactrim SS for PCP prophylaxis post engraftment    CMV PCR weekly post engraftment through day + 100.    2. GI Prophylaxis:    Protonix 40mg po QD     3.Mouthcare - NS / NaHCO3 rinses, Skin care     4. Transfuse & replete electrolytes prn   potassium chloride  20 mEq/100 mL IVPB 20 milliEquivalent(s) IV Intermittent every 2 hours    5. IV hydration, daily weights, strict I&O, prn diuresis     6. PO intake as tolerated, nutrition follow up as needed, MVI, folic acid     7. Antiemetics, anti-diarrhea medications  prochlorperazine   Injectable 10 milliGRAM(s) IntraMuscular every 6 hours PRN    8. OOB as tolerated, physical therapy consult if needed     9. Monitor coags 2x week, vitamin K 5mg po BIW (Mon and Thurs)    10. Monitor closely for clinical changes, monitor for fevers     11. Emotional support provided, plan of care discussed and questions addressed     12. Patient education done regarding plan of care, restrictions and discharge planning     13. Continue regular social work input     I have written the above note for Dr. Liz who performed service with me in the room.   I have seen and examined patient with NP, I agree with above note as scribed. Aziza Booker NP-C   HPC Transplant Team                                                      Critical / Counseling Time Provided: 30 minutes                                                                                                                                                        Chief Complaint: Autologous peripheral blood stem cell transplant with high dose melphalan (200mg/m2) prep regimen for treatment of multiple myeloma    S: Patient seen and examined with HPC Transplant Team:   + intermittent nausea  remainder of ROS is negative    Disease: MM  Type of Transplant: Autologous  Conditioning Prep: Melphalan (200mg/m2)  ABO/CMV: B pos/CMV-    O: Vitals:   Vital Signs Last 24 Hrs  T(C): 37.1 (07 Oct 2024 06:10), Max: 37.1 (07 Oct 2024 06:10)  T(F): 98.8 (07 Oct 2024 06:10), Max: 98.8 (07 Oct 2024 06:10)  HR: 88 (07 Oct 2024 06:10) (88 - 105)  BP: 147/84 (07 Oct 2024 06:10) (134/71 - 154/89)  BP(mean): --  RR: 18 (07 Oct 2024 06:10) (18 - 18)  SpO2: 99% (07 Oct 2024 06:10) (98% - 99%)    Parameters below as of 07 Oct 2024 06:10  Patient On (Oxygen Delivery Method): room air        Admit weight: 58.9kg     Intake / Output:   10-06 @ 07:01  -  10-07 @ 07:00  --------------------------------------------------------  IN: 1060 mL / OUT: 2300 mL / NET: -1240 mL      PE:   Oropharynx: No erythema or ulcerations   Oral Mucositis:                                                        stGstrstastdstest:st st1st CVS: +S1,S2, RRR   Lungs: CTA b/l  Abdomen: +BS x 3, soft, NT/ND  Extremities: no edema BLE's  Gastric Mucositis:                                                  stGstrstastdstest:st st1st Intestinal Mucositis:                                              stGstrstastdstest:st st1st Skin: no rash  TLC: CDI  Neuro: A&O x 3  Pain: 0      Labs:   CBC Full  -  ( 07 Oct 2024 07:24 )  WBC Count : 5.97 K/uL  Hemoglobin : 11.0 g/dL  Hematocrit : 33.0 %  Platelet Count - Automated : 223 K/uL  Mean Cell Volume : 95.1 fl  Mean Cell Hemoglobin : 31.7 pg  Mean Cell Hemoglobin Concentration : 33.3 gm/dL  Auto Neutrophil # : 5.52 K/uL  Auto Lymphocyte # : 0.40 K/uL  Auto Monocyte # : 0.01 K/uL  Auto Eosinophil # : 0.02 K/uL  Auto Basophil # : 0.01 K/uL  Auto Neutrophil % : 92.4 %  Auto Lymphocyte % : 6.7 %  Auto Monocyte % : 0.2 %  Auto Eosinophil % : 0.3 %  Auto Basophil % : 0.2 %                          11.0   5.97  )-----------( 223      ( 07 Oct 2024 07:24 )             33.0     10-07    137  |  101  |  10  ----------------------------<  101[H]  3.3[L]   |  24  |  0.68    Ca    9.0      07 Oct 2024 07:28  Phos  3.3     10-07  Mg     2.3     10-07    TPro  5.9[L]  /  Alb  4.0  /  TBili  1.5[H]  /  DBili  0.2  /  AST  17  /  ALT  17  /  AlkPhos  73  10-07    PT/INR - ( 07 Oct 2024 07:26 )   PT: 11.4 sec;   INR: 1.00 ratio         PTT - ( 07 Oct 2024 07:26 )  PTT:27.3 sec  LIVER FUNCTIONS - ( 07 Oct 2024 07:28 )  Alb: 4.0 g/dL / Pro: 5.9 g/dL / ALK PHOS: 73 U/L / ALT: 17 U/L / AST: 17 U/L / GGT: x           Lactate Dehydrogenase, Serum: 217 U/L (10-07 @ 07:28)        Cultures:   Culture Results:   No growth at 72 Hours (10.03.24 @ 14:59)    Culture Results:   No growth at 72 Hours (10.03.24 @ 10:45)          Meds:   Antimicrobials:   acyclovir   Oral Tab/Cap 800 milliGRAM(s) Oral every 12 hours      Heme / Onc:       GI:  senna 2 Tablet(s) Oral at bedtime PRN  sodium bicarbonate Mouth Rinse 10 milliLiter(s) Swish and Spit five times a day      Cardiovascular:       Immunologic:       Other medications:   acetaminophen     Tablet .. 650 milliGRAM(s) Oral every 6 hours  Biotene Dry Mouth Oral Rinse 5 milliLiter(s) Swish and Spit five times a day  chlorhexidine 2% Cloths 1 Application(s) Topical <User Schedule>  chlorhexidine 4% Liquid 1 Application(s) Topical <User Schedule>  phytonadione   Solution 5 milliGRAM(s) Oral <User Schedule>  potassium chloride  20 mEq/100 mL IVPB 20 milliEquivalent(s) IV Intermittent every 2 hours  sodium chloride 0.9%. 1000 milliLiter(s) IV Continuous <Continuous>  sodium chloride 0.9%. 500 milliLiter(s) IV Continuous <Continuous>      PRN:   acetaminophen     Tablet .. 650 milliGRAM(s) Oral every 6 hours PRN  prochlorperazine   Injectable 10 milliGRAM(s) IntraMuscular every 6 hours PRN  senna 2 Tablet(s) Oral at bedtime PRN  sodium chloride 0.9% lock flush 10 milliLiter(s) IV Push every 1 hour PRN    A/P: 58 year old female with recently diagnosed multiple myeloma (3/2024) treated with 5 cycles of Jolene-VRd, admitted for an autologous pbsct with melphalan (200mg/m2) prep regimen.  Today is Day + 3    1. Infectious Disease:   Acyclovir 800 bid  Fluconazole to start when ANC < 500 and continue through engraftment    Levaquin to start when ANC < 1000   Bactrim SS for PCP prophylaxis post engraftment    CMV PCR weekly post engraftment through day + 100.    2. GI Prophylaxis:    Protonix 40mg po QD     3.Mouthcare - NS / NaHCO3 rinses, Skin care     4. Transfuse & replete electrolytes prn   potassium chloride  20 mEq/100 mL IVPB 20 milliEquivalent(s) IV Intermittent every 2 hours    5. IV hydration, daily weights, strict I&O, prn diuresis     6. PO intake as tolerated, nutrition follow up as needed, MVI, folic acid     7. Antiemetics, anti-diarrhea medications  prochlorperazine   Injectable 10 milliGRAM(s) IntraMuscular every 6 hours PRN    8. OOB as tolerated, physical therapy consult if needed     9. Monitor coags 2x week, vitamin K 5mg po BIW (Mon and Thurs)    10. Monitor closely for clinical changes, monitor for fevers     11. Emotional support provided, plan of care discussed and questions addressed     12. Patient education done regarding plan of care, restrictions and discharge planning     13. Continue regular social work input     I have written the above note for Dr. Liz who performed service with me in the room.   I have seen and examined patient with NP, I agree with above note as scribed. Aziza Booker NP-C

## 2024-10-07 NOTE — PROGRESS NOTE ADULT - NS ATTEND AMEND GEN_ALL_CORE FT
58 year old female with recently diagnosed multiple myeloma (3/2024) treated with 5 cycles of Jolene-VRd, admitted for an autologous pbsct with melphalan (200mg/m2) prep regimen  1. Admit to BMTU on day 3..mild nausea  ....completed post infusion hydration..weight noted, improved  2. TLC placement in IR    3. Melphalan hydration: start NS at 250ml / hr for 2 hours before and 2 hours post melphalan infusion    4. On day -1;  melphalan 200mg/m2 IV once administered over 30 minutes; initiate cryotherapy (one tbsp of ice in the mouth constantly) 15 minutes before, during and following the melphalan infusion    5. Stem cell infusion on day 0    6. Start zarxio 5mcg/kg/day daily on day + 5.  1. Antiviral prophylaxis with acyclovir 800 po BID to start on admission    2. Fluconazole to start when ANC < 500 and continue through engraftment    3. Levaquin to start when ANC < 1000   4. Bactrim SS for PCP prophylaxis post engraftment    5. CMV PCR weekly post engraftment through day + 100  6. transfuse as per guidelines  7. follow i/o, replete lytes prn  8. mouth and skin care 58 year old female with recently diagnosed multiple myeloma (3/2024) treated with 5 cycles of Jolene-VRd, admitted for an autologous pbsct with melphalan (200mg/m2) prep regimen  1. Admit to BMTU, now day +3..mild nausea  ....completed post infusion hydration..weight noted, improved  2. TLC placement in IR    3. Melphalan hydration: start NS at 250ml / hr for 2 hours before and 2 hours post melphalan infusion    4. On day -1;  melphalan 200mg/m2 IV once administered over 30 minutes; initiate cryotherapy (one tbsp of ice in the mouth constantly) 15 minutes before, during and following the melphalan infusion    5. Stem cell infusion on day 0    6. Start zarxio 5mcg/kg/day daily on day + 5.  1. Antiviral prophylaxis with acyclovir 800 po BID to start on admission    2. Fluconazole to start when ANC < 500 and continue through engraftment    3. Levaquin to start when ANC < 1000   4. Bactrim SS for PCP prophylaxis post engraftment    5. CMV PCR weekly post engraftment through day + 100  6. Receiving transfusions as per guidelines  7. follow i/o, replete lytes prn  8. mouth and skin care    Patient was seen in IDR with nurses, ACP.   I personally examined the patient; data reviewed. I addressed patient's questions.     Over 35 minutes was spent in direct patient care and care coordination.

## 2024-10-08 LAB
ALBUMIN SERPL ELPH-MCNC: 3.9 G/DL — SIGNIFICANT CHANGE UP (ref 3.3–5)
ALP SERPL-CCNC: 74 U/L — SIGNIFICANT CHANGE UP (ref 40–120)
ALT FLD-CCNC: 22 U/L — SIGNIFICANT CHANGE UP (ref 10–45)
ANION GAP SERPL CALC-SCNC: 10 MMOL/L — SIGNIFICANT CHANGE UP (ref 5–17)
AST SERPL-CCNC: 20 U/L — SIGNIFICANT CHANGE UP (ref 10–40)
BASOPHILS # BLD AUTO: 0.01 K/UL — SIGNIFICANT CHANGE UP (ref 0–0.2)
BASOPHILS NFR BLD AUTO: 0.2 % — SIGNIFICANT CHANGE UP (ref 0–2)
BILIRUB SERPL-MCNC: 1.1 MG/DL — SIGNIFICANT CHANGE UP (ref 0.2–1.2)
BUN SERPL-MCNC: 11 MG/DL — SIGNIFICANT CHANGE UP (ref 7–23)
CALCIUM SERPL-MCNC: 8.9 MG/DL — SIGNIFICANT CHANGE UP (ref 8.4–10.5)
CHLORIDE SERPL-SCNC: 100 MMOL/L — SIGNIFICANT CHANGE UP (ref 96–108)
CO2 SERPL-SCNC: 26 MMOL/L — SIGNIFICANT CHANGE UP (ref 22–31)
CREAT SERPL-MCNC: 0.69 MG/DL — SIGNIFICANT CHANGE UP (ref 0.5–1.3)
CULTURE RESULTS: SIGNIFICANT CHANGE UP
CULTURE RESULTS: SIGNIFICANT CHANGE UP
EGFR: 99 ML/MIN/1.73M2 — SIGNIFICANT CHANGE UP
EOSINOPHIL # BLD AUTO: 0.03 K/UL — SIGNIFICANT CHANGE UP (ref 0–0.5)
EOSINOPHIL NFR BLD AUTO: 0.7 % — SIGNIFICANT CHANGE UP (ref 0–6)
GLUCOSE SERPL-MCNC: 99 MG/DL — SIGNIFICANT CHANGE UP (ref 70–99)
HCT VFR BLD CALC: 32.2 % — LOW (ref 34.5–45)
HGB BLD-MCNC: 10.9 G/DL — LOW (ref 11.5–15.5)
IMM GRANULOCYTES NFR BLD AUTO: 0.5 % — SIGNIFICANT CHANGE UP (ref 0–0.9)
LDH SERPL L TO P-CCNC: 196 U/L — SIGNIFICANT CHANGE UP (ref 50–242)
LYMPHOCYTES # BLD AUTO: 0.34 K/UL — LOW (ref 1–3.3)
LYMPHOCYTES # BLD AUTO: 7.7 % — LOW (ref 13–44)
MAGNESIUM SERPL-MCNC: 2.3 MG/DL — SIGNIFICANT CHANGE UP (ref 1.6–2.6)
MCHC RBC-ENTMCNC: 32.4 PG — SIGNIFICANT CHANGE UP (ref 27–34)
MCHC RBC-ENTMCNC: 33.9 GM/DL — SIGNIFICANT CHANGE UP (ref 32–36)
MCV RBC AUTO: 95.8 FL — SIGNIFICANT CHANGE UP (ref 80–100)
MONOCYTES # BLD AUTO: 0.01 K/UL — SIGNIFICANT CHANGE UP (ref 0–0.9)
MONOCYTES NFR BLD AUTO: 0.2 % — LOW (ref 2–14)
NEUTROPHILS # BLD AUTO: 4 K/UL — SIGNIFICANT CHANGE UP (ref 1.8–7.4)
NEUTROPHILS NFR BLD AUTO: 90.7 % — HIGH (ref 43–77)
NRBC # BLD: 0 /100 WBCS — SIGNIFICANT CHANGE UP (ref 0–0)
PHOSPHATE SERPL-MCNC: 3.5 MG/DL — SIGNIFICANT CHANGE UP (ref 2.5–4.5)
PLATELET # BLD AUTO: 195 K/UL — SIGNIFICANT CHANGE UP (ref 150–400)
POTASSIUM SERPL-MCNC: 3.7 MMOL/L — SIGNIFICANT CHANGE UP (ref 3.5–5.3)
POTASSIUM SERPL-SCNC: 3.7 MMOL/L — SIGNIFICANT CHANGE UP (ref 3.5–5.3)
PROT SERPL-MCNC: 5.8 G/DL — LOW (ref 6–8.3)
RBC # BLD: 3.36 M/UL — LOW (ref 3.8–5.2)
RBC # FLD: 14 % — SIGNIFICANT CHANGE UP (ref 10.3–14.5)
SODIUM SERPL-SCNC: 136 MMOL/L — SIGNIFICANT CHANGE UP (ref 135–145)
SPECIMEN SOURCE: SIGNIFICANT CHANGE UP
SPECIMEN SOURCE: SIGNIFICANT CHANGE UP
WBC # BLD: 4.41 K/UL — SIGNIFICANT CHANGE UP (ref 3.8–10.5)
WBC # FLD AUTO: 4.41 K/UL — SIGNIFICANT CHANGE UP (ref 3.8–10.5)

## 2024-10-08 RX ADMIN — Medication 5 MILLILITER(S): at 00:33

## 2024-10-08 RX ADMIN — Medication 10 MILLILITER(S): at 00:33

## 2024-10-08 RX ADMIN — Medication 5 MILLILITER(S): at 12:20

## 2024-10-08 RX ADMIN — Medication 5 MILLILITER(S): at 08:37

## 2024-10-08 RX ADMIN — CHLORHEXIDINE GLUCONATE ORAL RINSE 1 APPLICATION(S): 1.2 SOLUTION DENTAL at 08:40

## 2024-10-08 RX ADMIN — Medication 10 MILLILITER(S): at 20:09

## 2024-10-08 RX ADMIN — Medication 800 MILLIGRAM(S): at 18:39

## 2024-10-08 RX ADMIN — Medication 2 TABLET(S): at 21:56

## 2024-10-08 RX ADMIN — Medication 10 MILLILITER(S): at 15:43

## 2024-10-08 RX ADMIN — PANTOPRAZOLE SODIUM 40 MILLIGRAM(S): 40 TABLET, DELAYED RELEASE ORAL at 05:54

## 2024-10-08 RX ADMIN — Medication 5 MILLILITER(S): at 15:36

## 2024-10-08 RX ADMIN — CHLORHEXIDINE GLUCONATE ORAL RINSE 1 APPLICATION(S): 1.2 SOLUTION DENTAL at 08:39

## 2024-10-08 RX ADMIN — Medication 800 MILLIGRAM(S): at 05:54

## 2024-10-08 RX ADMIN — Medication 10 MILLILITER(S): at 12:20

## 2024-10-08 RX ADMIN — Medication 5 MILLILITER(S): at 20:09

## 2024-10-08 NOTE — PROGRESS NOTE ADULT - NS ATTEND AMEND GEN_ALL_CORE FT
58 year old female with recently diagnosed multiple myeloma (3/2024) treated with 5 cycles of Jolene-VRd, admitted for an autologous pbsct with melphalan (200mg/m2) prep regimen  1. Admit to BMTU, now day +3..mild nausea  ....completed post infusion hydration..weight noted, improved  2. TLC placement in IR    3. Melphalan hydration: start NS at 250ml / hr for 2 hours before and 2 hours post melphalan infusion    4. On day -1;  melphalan 200mg/m2 IV once administered over 30 minutes; initiate cryotherapy (one tbsp of ice in the mouth constantly) 15 minutes before, during and following the melphalan infusion    5. Stem cell infusion on day 0    6. Start zarxio 5mcg/kg/day daily on day + 5.  1. Antiviral prophylaxis with acyclovir 800 po BID to start on admission    2. Fluconazole to start when ANC < 500 and continue through engraftment    3. Levaquin to start when ANC < 1000   4. Bactrim SS for PCP prophylaxis post engraftment    5. CMV PCR weekly post engraftment through day + 100  6. Receiving transfusions as per guidelines  7. follow i/o, replete lytes prn  8. mouth and skin care    Patient was seen in IDR with nurses, ACP.   I personally examined the patient; data reviewed. I addressed patient's questions.     Over 35 minutes was spent in direct patient care and care coordination. 58 year old female with recently diagnosed multiple myeloma (3/2024) treated with 5 cycles of Jolene-VRd, admitted for an autologous pbsct with melphalan (200mg/m2) prep regimen  1. Admit to BMTU, now day +4..mild nausea  ....completed post infusion hydration..weight noted, improved  2. TLC placement in IR    3. Melphalan hydration: start NS at 250ml / hr for 2 hours before and 2 hours post melphalan infusion    4. On day -1;  melphalan 200mg/m2 IV once administered over 30 minutes; initiate cryotherapy (one tbsp of ice in the mouth constantly) 15 minutes before, during and following the melphalan infusion    5. Stem cell infusion on day 0    6. Start zarxio 5mcg/kg/day daily on day + 5.  1. Antiviral prophylaxis with acyclovir 800 po BID to start on admission    2. Fluconazole to start when ANC < 500 and continue through engraftment    3. Levaquin to start when ANC < 1000   4. Bactrim SS for PCP prophylaxis post engraftment    5. CMV PCR weekly post engraftment through day + 100  6. Receiving transfusions as per guidelines  7. follow i/o, replete lytes prn  8. mouth and skin care    Patient was seen in IDR with nurses, ACP.   I personally examined the patient; data reviewed. I addressed patient's questions.     Over 35 minutes was spent in direct patient care and care coordination.

## 2024-10-08 NOTE — PROGRESS NOTE ADULT - SUBJECTIVE AND OBJECTIVE BOX
[facility] Transplant Team                                                      Critical / Counseling Time Provided: 30 minutes                                                                                                                                                        Chief Complaint: Autologous peripheral blood stem cell transplant with high dose melphalan (200mg/m2) prep regimen for treatment of multiple myeloma    Disease: MM  Type of Transplant: Autologous  Conditioning Prep: Melphalan (200mg/m2)  ABO/CMV: B pos/CMV-      S: Patient seen and examined with [facility] Transplant Team:       O:     Vital Signs Last 24 Hrs  T(C): 37.1 (08 Oct 2024 05:49), Max: 37.1 (08 Oct 2024 05:49)  T(F): 98.8 (08 Oct 2024 05:49), Max: 98.8 (08 Oct 2024 05:49)  HR: 92 (08 Oct 2024 05:49) (89 - 112)  BP: 147/82 (08 Oct 2024 05:49) (129/84 - 147/82)  BP(mean): --  RR: 18 (08 Oct 2024 05:49) (18 - 18)  SpO2: 99% (08 Oct 2024 05:49) (98% - 99%)    Parameters below as of 08 Oct 2024 05:49  Patient On (Oxygen Delivery Method): room air      Admit weight: 58.9kg  Daily Weight in kG:     Intake / Output:   10-07 @ 07:01  -  10-08 @ 07:00  --------------------------------------------------------  IN: 1363 mL / OUT: 2925 mL / NET: -1562 mL    PE:   Oropharynx: No erythema or ulcerations   Oral Mucositis:                                                        stGstrstastdstest:st st1st CVS: +S1,S2, RRR   Lungs: CTA b/l  Abdomen: +BS x 3, soft, NT/ND  Extremities: no edema BLE's  Gastric Mucositis:                                                  stGstrstastdstest:st st1st Intestinal Mucositis:                                              stGstrstastdstest:st st1st Skin: no rash  TLC: CDI  Neuro: A&O x 3  Pain: 0        Labs:     --------      Cultures:   Culture Results:   No growth at 5 days (10.03.24 @ 14:59)    Culture Results:   No growth at 5 days (10.03.24 @ 10:45)        Radiology:       Meds:   Antimicrobials:   acyclovir   Oral Tab/Cap 800 milliGRAM(s) Oral every 12 hours      Heme / Onc:       GI:  pantoprazole    Tablet 40 milliGRAM(s) Oral before breakfast  senna 2 Tablet(s) Oral at bedtime PRN  sodium bicarbonate Mouth Rinse 10 milliLiter(s) Swish and Spit five times a day      Cardiovascular:       Immunologic:       Other medications:   acetaminophen     Tablet .. 650 milliGRAM(s) Oral every 6 hours  Biotene Dry Mouth Oral Rinse 5 milliLiter(s) Swish and Spit five times a day  chlorhexidine 2% Cloths 1 Application(s) Topical <User Schedule>  chlorhexidine 4% Liquid 1 Application(s) Topical <User Schedule>  phytonadione   Solution 5 milliGRAM(s) Oral <User Schedule>  sodium chloride 0.9%. 1000 milliLiter(s) IV Continuous <Continuous>  sodium chloride 0.9%. 500 milliLiter(s) IV Continuous <Continuous>      PRN:   acetaminophen     Tablet .. 650 milliGRAM(s) Oral every 6 hours PRN  prochlorperazine   Injectable 10 milliGRAM(s) IV Push every 6 hours PRN  senna 2 Tablet(s) Oral at bedtime PRN  sodium chloride 0.9% lock flush 10 milliLiter(s) IV Push every 1 hour PRN    A/P: 58 year old female with recently diagnosed multiple myeloma (3/2024) treated with 5 cycles of Jolene-VRd, admitted for an autologous pbsct with melphalan (200mg/m2) prep regimen.  Today is Day + 4    1. Infectious Disease:   Acyclovir 800 bid  Fluconazole to start when ANC < 500 and continue through engraftment    Levaquin to start when ANC < 1000   Bactrim SS for PCP prophylaxis post engraftment    CMV PCR weekly post engraftment through day + 100.    2. GI Prophylaxis:    Protonix 40mg po QD     3.Mouthcare - NS / NaHCO3 rinses, Skin care     4. Transfuse & replete electrolytes prn   potassium chloride  20 mEq/100 mL IVPB 20 milliEquivalent(s) IV Intermittent every 2 hours    5. IV hydration, daily weights, strict I&O, prn diuresis     6. PO intake as tolerated, nutrition follow up as needed, MVI, folic acid     7. Antiemetics, anti-diarrhea medications  prochlorperazine   Injectable 10 milliGRAM(s) IntraMuscular every 6 hours PRN    8. OOB as tolerated, physical therapy consult if needed     9. Monitor coags 2x week, vitamin K 5mg po BIW (Mon and Thurs)    10. Monitor closely for clinical changes, monitor for fevers     11. Emotional support provided, plan of care discussed and questions addressed     12. Patient education done regarding plan of care, restrictions and discharge planning     13. Continue regular social work input     I have written the above note for Dr. Liz who performed service with me in the room.   I have seen and examined patient with PA, I agree with above note as scribed. Jr Hsieh PA-C                 [facility] Transplant Team                                                      Critical / Counseling Time Provided: 30 minutes                                                                                                                                                        Chief Complaint: Autologous peripheral blood stem cell transplant with high dose melphalan (200mg/m2) prep regimen for treatment of multiple myeloma    Disease: MM  Type of Transplant: Autologous  Conditioning Prep: Melphalan (200mg/m2)  ABO/CMV: B pos/CMV-      S: Patient seen and examined with [facility] Transplant Team:       O:     Vital Signs Last 24 Hrs  T(C): 37.1 (08 Oct 2024 05:49), Max: 37.1 (08 Oct 2024 05:49)  T(F): 98.8 (08 Oct 2024 05:49), Max: 98.8 (08 Oct 2024 05:49)  HR: 92 (08 Oct 2024 05:49) (89 - 112)  BP: 147/82 (08 Oct 2024 05:49) (129/84 - 147/82)  BP(mean): --  RR: 18 (08 Oct 2024 05:49) (18 - 18)  SpO2: 99% (08 Oct 2024 05:49) (98% - 99%)    Parameters below as of 08 Oct 2024 05:49  Patient On (Oxygen Delivery Method): room air      Admit weight: 58.9kg  Daily Weight in kG:     Intake / Output:   10-07 @ 07:01  -  10-08 @ 07:00  --------------------------------------------------------  IN: 1363 mL / OUT: 2925 mL / NET: -1562 mL    PE:   Oropharynx: No erythema or ulcerations   Oral Mucositis:                                                        stGstrstastdstest:st st1st CVS: +S1,S2, RRR   Lungs: CTA b/l  Abdomen: +BS x 3, soft, NT/ND  Extremities: no edema BLE's  Gastric Mucositis:                                                  stGstrstastdstest:st st1st Intestinal Mucositis:                                              stGstrstastdstest:st st1st Skin: no rash  TLC: CDI  Neuro: A&O x 3  Pain: 0        Labs:                         10.9   4.41  )-----------( 195      ( 08 Oct 2024 07:14 )             32.2     10-08    136  |  100  |  11  ----------------------------<  99  3.7   |  26  |  0.69    Ca    8.9      08 Oct 2024 07:16  Phos  3.5     10-08  Mg     2.3     10-08    TPro  5.8[L]  /  Alb  3.9  /  TBili  1.1  /  DBili  x   /  AST  20  /  ALT  22  /  AlkPhos  74  10-08      Cultures:   Culture Results:   No growth at 5 days (10.03.24 @ 14:59)    Culture Results:   No growth at 5 days (10.03.24 @ 10:45)        Radiology:       Meds:   Antimicrobials:   acyclovir   Oral Tab/Cap 800 milliGRAM(s) Oral every 12 hours      Heme / Onc:       GI:  pantoprazole    Tablet 40 milliGRAM(s) Oral before breakfast  senna 2 Tablet(s) Oral at bedtime PRN  sodium bicarbonate Mouth Rinse 10 milliLiter(s) Swish and Spit five times a day      Cardiovascular:       Immunologic:       Other medications:   acetaminophen     Tablet .. 650 milliGRAM(s) Oral every 6 hours  Biotene Dry Mouth Oral Rinse 5 milliLiter(s) Swish and Spit five times a day  chlorhexidine 2% Cloths 1 Application(s) Topical <User Schedule>  chlorhexidine 4% Liquid 1 Application(s) Topical <User Schedule>  phytonadione   Solution 5 milliGRAM(s) Oral <User Schedule>  sodium chloride 0.9%. 1000 milliLiter(s) IV Continuous <Continuous>  sodium chloride 0.9%. 500 milliLiter(s) IV Continuous <Continuous>      PRN:   acetaminophen     Tablet .. 650 milliGRAM(s) Oral every 6 hours PRN  prochlorperazine   Injectable 10 milliGRAM(s) IV Push every 6 hours PRN  senna 2 Tablet(s) Oral at bedtime PRN  sodium chloride 0.9% lock flush 10 milliLiter(s) IV Push every 1 hour PRN    A/P: 58 year old female with recently diagnosed multiple myeloma (3/2024) treated with 5 cycles of Jolene-VRd, admitted for an autologous pbsct with melphalan (200mg/m2) prep regimen.  Today is Day + 4    1. Infectious Disease:   Acyclovir 800 bid  Fluconazole to start when ANC < 500 and continue through engraftment    Levaquin to start when ANC < 1000   Bactrim SS for PCP prophylaxis post engraftment    CMV PCR weekly post engraftment through day + 100.    2. GI Prophylaxis:    Protonix 40mg po QD     3.Mouthcare - NS / NaHCO3 rinses, Skin care     4. Transfuse & replete electrolytes prn   potassium chloride  20 mEq/100 mL IVPB 20 milliEquivalent(s) IV Intermittent every 2 hours    5. IV hydration, daily weights, strict I&O, prn diuresis     6. PO intake as tolerated, nutrition follow up as needed, MVI, folic acid     7. Antiemetics, anti-diarrhea medications  prochlorperazine   Injectable 10 milliGRAM(s) IntraMuscular every 6 hours PRN    8. OOB as tolerated, physical therapy consult if needed     9. Monitor coags 2x week, vitamin K 5mg po BIW (Mon and Thurs)    10. Monitor closely for clinical changes, monitor for fevers     11. Emotional support provided, plan of care discussed and questions addressed     12. Patient education done regarding plan of care, restrictions and discharge planning     13. Continue regular social work input     I have written the above note for Dr. Liz who performed service with me in the room.   I have seen and examined patient with PA, I agree with above note as scribed. Jr Hsieh PA-C                 [facility] Transplant Team                                                      Critical / Counseling Time Provided: 30 minutes                                                                                                                                                        Chief Complaint: Autologous peripheral blood stem cell transplant with high dose melphalan (200mg/m2) prep regimen for treatment of multiple myeloma    Disease: MM  Type of Transplant: Autologous  Conditioning Prep: Melphalan (200mg/m2)  ABO/CMV: B pos/CMV-      S: Patient seen and examined with [facility] Transplant Team: +BM today  +nausea    O: rest of ROS negative    Vital Signs Last 24 Hrs  T(C): 37.1 (08 Oct 2024 05:49), Max: 37.1 (08 Oct 2024 05:49)  T(F): 98.8 (08 Oct 2024 05:49), Max: 98.8 (08 Oct 2024 05:49)  HR: 92 (08 Oct 2024 05:49) (89 - 112)  BP: 147/82 (08 Oct 2024 05:49) (129/84 - 147/82)  BP(mean): --  RR: 18 (08 Oct 2024 05:49) (18 - 18)  SpO2: 99% (08 Oct 2024 05:49) (98% - 99%)    Parameters below as of 08 Oct 2024 05:49  Patient On (Oxygen Delivery Method): room air      Admit weight: 58.9kg  Daily Weight in k.4kg    Intake / Output:   10-07 @ 07:01  -  10-08 @ 07:00  --------------------------------------------------------  IN: 1363 mL / OUT: 2925 mL / NET: -1562 mL    PE:   Oropharynx: No erythema or ulcerations   Oral Mucositis:                                                        stGstrstastdstest:st st1st CVS: +S1,S2, RRR   Lungs: CTA b/l  Abdomen: +BS x 3, soft, NT/ND  Extremities: no edema BLE's  Gastric Mucositis:                                                  stGstrstastdstest:st st1st Intestinal Mucositis:                                              stGstrstastdstest:st st1st Skin: no rash  TLC: CDI  Neuro: A&O x 3  Pain: 0        Labs:                         10.9   4.41  )-----------( 195      ( 08 Oct 2024 07:14 )             32.2     10-08    136  |  100  |  11  ----------------------------<  99  3.7   |  26  |  0.69    Ca    8.9      08 Oct 2024 07:16  Phos  3.5     10-08  Mg     2.3     10-08    TPro  5.8[L]  /  Alb  3.9  /  TBili  1.1  /  DBili  x   /  AST  20  /  ALT  22  /  AlkPhos  74  10-08      Cultures:   Culture Results:   No growth at 5 days (10.03.24 @ 14:59)    Culture Results:   No growth at 5 days (10.03.24 @ 10:45)    Radiology:       Meds:   Antimicrobials:   acyclovir   Oral Tab/Cap 800 milliGRAM(s) Oral every 12 hours      Heme / Onc:       GI:  pantoprazole    Tablet 40 milliGRAM(s) Oral before breakfast  senna 2 Tablet(s) Oral at bedtime PRN  sodium bicarbonate Mouth Rinse 10 milliLiter(s) Swish and Spit five times a day      Cardiovascular:       Immunologic:       Other medications:   acetaminophen     Tablet .. 650 milliGRAM(s) Oral every 6 hours  Biotene Dry Mouth Oral Rinse 5 milliLiter(s) Swish and Spit five times a day  chlorhexidine 2% Cloths 1 Application(s) Topical <User Schedule>  chlorhexidine 4% Liquid 1 Application(s) Topical <User Schedule>  phytonadione   Solution 5 milliGRAM(s) Oral <User Schedule>  sodium chloride 0.9%. 1000 milliLiter(s) IV Continuous <Continuous>  sodium chloride 0.9%. 500 milliLiter(s) IV Continuous <Continuous>      PRN:   acetaminophen     Tablet .. 650 milliGRAM(s) Oral every 6 hours PRN  prochlorperazine   Injectable 10 milliGRAM(s) IV Push every 6 hours PRN  senna 2 Tablet(s) Oral at bedtime PRN  sodium chloride 0.9% lock flush 10 milliLiter(s) IV Push every 1 hour PRN    A/P: 58 year old female with recently diagnosed multiple myeloma (3/2024) treated with 5 cycles of Jolene-VRd, admitted for an autologous pbsct with melphalan (200mg/m2) prep regimen.  Today is Day + 4    1. Infectious Disease:   Acyclovir 800 bid  Fluconazole to start when ANC < 500 and continue through engraftment    Levaquin to start when ANC < 1000   Bactrim SS for PCP prophylaxis post engraftment    CMV PCR weekly post engraftment through day + 100.    2. GI Prophylaxis:    Protonix 40mg po QD     3.Mouthcare - NS / NaHCO3 rinses, Skin care     4. Transfuse & replete electrolytes prn     5. IV hydration, daily weights, strict I&O, prn diuresis     6. PO intake as tolerated, nutrition follow up as needed, MVI, folic acid     7. Antiemetics, anti-diarrhea medications  prochlorperazine   Injectable 10 milliGRAM(s) IntraMuscular every 6 hours PRN    8. OOB as tolerated, physical therapy consult if needed     9. Monitor coags 2x week, vitamin K 5mg po BIW (Mon and Thurs)    10. Monitor closely for clinical changes, monitor for fevers     11. Emotional support provided, plan of care discussed and questions addressed     12. Patient education done regarding plan of care, restrictions and discharge planning     13. Continue regular social work input     I have written the above note for Dr. Liz who performed service with me in the room.   I have seen and examined patient with PA, I agree with above note as scribed. Jr Hsieh PA-C

## 2024-10-09 LAB
ALBUMIN SERPL ELPH-MCNC: 4 G/DL — SIGNIFICANT CHANGE UP (ref 3.3–5)
ALP SERPL-CCNC: 73 U/L — SIGNIFICANT CHANGE UP (ref 40–120)
ALT FLD-CCNC: 17 U/L — SIGNIFICANT CHANGE UP (ref 10–45)
ANION GAP SERPL CALC-SCNC: 11 MMOL/L — SIGNIFICANT CHANGE UP (ref 5–17)
AST SERPL-CCNC: 16 U/L — SIGNIFICANT CHANGE UP (ref 10–40)
BASOPHILS # BLD AUTO: 0 K/UL — SIGNIFICANT CHANGE UP (ref 0–0.2)
BASOPHILS NFR BLD AUTO: 0 % — SIGNIFICANT CHANGE UP (ref 0–2)
BILIRUB DIRECT SERPL-MCNC: 0.1 MG/DL — SIGNIFICANT CHANGE UP (ref 0–0.3)
BILIRUB INDIRECT FLD-MCNC: 0.6 MG/DL — SIGNIFICANT CHANGE UP (ref 0.2–1)
BILIRUB SERPL-MCNC: 0.7 MG/DL — SIGNIFICANT CHANGE UP (ref 0.2–1.2)
BLD GP AB SCN SERPL QL: POSITIVE — SIGNIFICANT CHANGE UP
BUN SERPL-MCNC: 11 MG/DL — SIGNIFICANT CHANGE UP (ref 7–23)
CALCIUM SERPL-MCNC: 8.7 MG/DL — SIGNIFICANT CHANGE UP (ref 8.4–10.5)
CHLORIDE SERPL-SCNC: 100 MMOL/L — SIGNIFICANT CHANGE UP (ref 96–108)
CO2 SERPL-SCNC: 25 MMOL/L — SIGNIFICANT CHANGE UP (ref 22–31)
CREAT SERPL-MCNC: 0.73 MG/DL — SIGNIFICANT CHANGE UP (ref 0.5–1.3)
EGFR: 94 ML/MIN/1.73M2 — SIGNIFICANT CHANGE UP
EOSINOPHIL # BLD AUTO: 0 K/UL — SIGNIFICANT CHANGE UP (ref 0–0.5)
EOSINOPHIL NFR BLD AUTO: 0 % — SIGNIFICANT CHANGE UP (ref 0–6)
GLUCOSE SERPL-MCNC: 102 MG/DL — HIGH (ref 70–99)
HCT VFR BLD CALC: 30.5 % — LOW (ref 34.5–45)
HGB BLD-MCNC: 10.4 G/DL — LOW (ref 11.5–15.5)
LDH SERPL L TO P-CCNC: 174 U/L — SIGNIFICANT CHANGE UP (ref 50–242)
LYMPHOCYTES # BLD AUTO: 0.29 K/UL — LOW (ref 1–3.3)
LYMPHOCYTES # BLD AUTO: 8.7 % — LOW (ref 13–44)
MAGNESIUM SERPL-MCNC: 2.3 MG/DL — SIGNIFICANT CHANGE UP (ref 1.6–2.6)
MANUAL SMEAR VERIFICATION: SIGNIFICANT CHANGE UP
MCHC RBC-ENTMCNC: 32 PG — SIGNIFICANT CHANGE UP (ref 27–34)
MCHC RBC-ENTMCNC: 34.1 GM/DL — SIGNIFICANT CHANGE UP (ref 32–36)
MCV RBC AUTO: 93.8 FL — SIGNIFICANT CHANGE UP (ref 80–100)
MONOCYTES # BLD AUTO: 0 K/UL — SIGNIFICANT CHANGE UP (ref 0–0.9)
MONOCYTES NFR BLD AUTO: 0 % — LOW (ref 2–14)
NEUTROPHILS # BLD AUTO: 3.03 K/UL — SIGNIFICANT CHANGE UP (ref 1.8–7.4)
NEUTROPHILS NFR BLD AUTO: 91.3 % — HIGH (ref 43–77)
PHOSPHATE SERPL-MCNC: 4.2 MG/DL — SIGNIFICANT CHANGE UP (ref 2.5–4.5)
PLAT MORPH BLD: NORMAL — SIGNIFICANT CHANGE UP
PLATELET # BLD AUTO: 156 K/UL — SIGNIFICANT CHANGE UP (ref 150–400)
POTASSIUM SERPL-MCNC: 3.4 MMOL/L — LOW (ref 3.5–5.3)
POTASSIUM SERPL-SCNC: 3.4 MMOL/L — LOW (ref 3.5–5.3)
PROT SERPL-MCNC: 5.7 G/DL — LOW (ref 6–8.3)
RBC # BLD: 3.25 M/UL — LOW (ref 3.8–5.2)
RBC # FLD: 13.7 % — SIGNIFICANT CHANGE UP (ref 10.3–14.5)
RBC BLD AUTO: SIGNIFICANT CHANGE UP
RH IG SCN BLD-IMP: POSITIVE — SIGNIFICANT CHANGE UP
SODIUM SERPL-SCNC: 136 MMOL/L — SIGNIFICANT CHANGE UP (ref 135–145)
WBC # BLD: 3.32 K/UL — LOW (ref 3.8–10.5)
WBC # FLD AUTO: 3.32 K/UL — LOW (ref 3.8–10.5)

## 2024-10-09 PROCEDURE — 99232 SBSQ HOSP IP/OBS MODERATE 35: CPT

## 2024-10-09 RX ADMIN — Medication 5 MILLILITER(S): at 23:34

## 2024-10-09 RX ADMIN — PANTOPRAZOLE SODIUM 40 MILLIGRAM(S): 40 TABLET, DELAYED RELEASE ORAL at 05:51

## 2024-10-09 RX ADMIN — Medication 800 MILLIGRAM(S): at 05:51

## 2024-10-09 RX ADMIN — Medication 40 MILLIEQUIVALENT(S): at 09:26

## 2024-10-09 RX ADMIN — Medication 800 MILLIGRAM(S): at 17:34

## 2024-10-09 RX ADMIN — Medication 5 MILLILITER(S): at 00:34

## 2024-10-09 RX ADMIN — Medication 5 MILLILITER(S): at 20:11

## 2024-10-09 RX ADMIN — Medication 10 MILLILITER(S): at 12:28

## 2024-10-09 RX ADMIN — Medication 10 MILLILITER(S): at 16:28

## 2024-10-09 RX ADMIN — Medication 5 MILLILITER(S): at 07:57

## 2024-10-09 RX ADMIN — Medication 10 MILLILITER(S): at 23:34

## 2024-10-09 RX ADMIN — Medication 5 MILLILITER(S): at 16:28

## 2024-10-09 RX ADMIN — CHLORHEXIDINE GLUCONATE ORAL RINSE 1 APPLICATION(S): 1.2 SOLUTION DENTAL at 07:58

## 2024-10-09 RX ADMIN — FILGRASTIM 300 MICROGRAM(S): 300 INJECTION, SOLUTION INTRAVENOUS at 12:27

## 2024-10-09 RX ADMIN — Medication 10 MILLILITER(S): at 07:57

## 2024-10-09 RX ADMIN — Medication 10 MILLILITER(S): at 20:11

## 2024-10-09 RX ADMIN — Medication 5 MILLILITER(S): at 12:28

## 2024-10-09 RX ADMIN — Medication 10 MILLILITER(S): at 00:34

## 2024-10-09 NOTE — PROGRESS NOTE ADULT - NS ATTEND AMEND GEN_ALL_CORE FT
58 year old female with recently diagnosed multiple myeloma (3/2024) treated with 5 cycles of Jolene-VRd, admitted for an autologous pbsct with melphalan (200mg/m2) prep regimen  1. Admit to BMTU, now day +4..mild nausea  ....completed post infusion hydration..weight noted, improved  2. TLC placement in IR    3. Melphalan hydration: start NS at 250ml / hr for 2 hours before and 2 hours post melphalan infusion    4. On day -1;  melphalan 200mg/m2 IV once administered over 30 minutes; initiate cryotherapy (one tbsp of ice in the mouth constantly) 15 minutes before, during and following the melphalan infusion    5. Stem cell infusion on day 0    6. Start zarxio 5mcg/kg/day daily on day + 5.  1. Antiviral prophylaxis with acyclovir 800 po BID to start on admission    2. Fluconazole to start when ANC < 500 and continue through engraftment    3. Levaquin to start when ANC < 1000   4. Bactrim SS for PCP prophylaxis post engraftment    5. CMV PCR weekly post engraftment through day + 100  6. Receiving transfusions as per guidelines  7. follow i/o, replete lytes prn  8. mouth and skin care    Patient was seen in IDR with nurses, ACP.   I personally examined the patient; data reviewed. I addressed patient's questions.     Over 35 minutes was spent in direct patient care and care coordination.

## 2024-10-09 NOTE — PROGRESS NOTE ADULT - SUBJECTIVE AND OBJECTIVE BOX
[facility] Transplant Team                                                      Critical / Counseling Time Provided: 30 minutes                                                                                                                                                        Chief Complaint: Autologous pbsct with high dose melphalan (200mg/m2) prep regimen for treatment of multiple myeloma    Disease: MM  Type of Transplant: Autologous  Conditioning Prep: Melphalan (200mg/m2)  ABO/CMV: B pos/CMV-    S: Patient seen and examined with [facility] Transplant Team:       O: Vitals:   Vital Signs Last 24 Hrs  T(C): 37.1 (09 Oct 2024 12:00), Max: 37.1 (09 Oct 2024 12:00)  T(F): 98.7 (09 Oct 2024 12:00), Max: 98.7 (09 Oct 2024 12:00)  HR: 99 (09 Oct 2024 12:00) (96 - 101)  BP: 126/77 (09 Oct 2024 12:00) (126/77 - 135/76)  BP(mean): --  RR: 18 (09 Oct 2024 12:00) (18 - 18)  SpO2: 97% (09 Oct 2024 12:00) (97% - 99%)    Parameters below as of 09 Oct 2024 12:00  Patient On (Oxygen Delivery Method): room air      Admit weight: 58.9kg   Daily Weight in k.1 (09 Oct 2024 07:58)    Intake / Output:   10-08 @ :  -  10-09 @ 07:00  --------------------------------------------------------  IN: 2137 mL / OUT: 5000 mL / NET: -2863 mL    10-09 @ :  -  10-09 @ 13:32  --------------------------------------------------------  IN: 360 mL / OUT: 500 mL / NET: -140 mL      PE:   Oropharynx: No erythema or ulcerations   Oral Mucositis:                                                        stGstrstastdstest:st st1st CVS: +S1,S2, RRR   Lungs: CTA b/l  Abdomen: +BS x 3, soft, NT/ND  Extremities: no edema BLE's  Gastric Mucositis:                                                  stGstrstastdstest:st st1st Intestinal Mucositis:                                              stGstrstastdstest:st st1st Skin: no rash  TLC: CDI  Neuro: A&O x 3  Pain: 0    Labs:   CBC Full  -  ( 09 Oct 2024 06:58 )  WBC Count : 3.32 K/uL  Hemoglobin : 10.4 g/dL  Hematocrit : 30.5 %  Platelet Count - Automated : 156 K/uL  Mean Cell Volume : 93.8 fl  Mean Cell Hemoglobin : 32.0 pg  Mean Cell Hemoglobin Concentration : 34.1 gm/dL  Auto Neutrophil # : 3.03 K/uL  Auto Lymphocyte # : 0.29 K/uL  Auto Monocyte # : 0.00 K/uL  Auto Eosinophil # : 0.00 K/uL  Auto Basophil # : 0.00 K/uL  Auto Neutrophil % : 91.3 %  Auto Lymphocyte % : 8.7 %  Auto Monocyte % : 0.0 %  Auto Eosinophil % : 0.0 %  Auto Basophil % : 0.0 %                          10.4   3.32  )-----------( 156      ( 09 Oct 2024 06:58 )             30.5     10-09    136  |  100  |  11  ----------------------------<  102[H]  3.4[L]   |  25  |  0.73    Ca    8.7      09 Oct 2024 06:58  Phos  4.2     10-09  Mg     2.3     10-09    TPro  5.7[L]  /  Alb  4.0  /  TBili  0.7  /  DBili  0.1  /  AST  16  /  ALT  17  /  AlkPhos  73  10-09      LIVER FUNCTIONS - ( 09 Oct 2024 06:58 )  Alb: 4.0 g/dL / Pro: 5.7 g/dL / ALK PHOS: 73 U/L / ALT: 17 U/L / AST: 16 U/L / GGT: x           Lactate Dehydrogenase, Serum: 174 U/L (10-09 @ 06:58)          Meds:   Antimicrobials:   acyclovir   Oral Tab/Cap 800 milliGRAM(s) Oral every 12 hours      Heme / Onc:       GI:  pantoprazole    Tablet 40 milliGRAM(s) Oral before breakfast  senna 2 Tablet(s) Oral at bedtime PRN  sodium bicarbonate Mouth Rinse 10 milliLiter(s) Swish and Spit five times a day      Cardiovascular:       Immunologic:   filgrastim-sndz (ZARXIO) Injectable 300 MICROGram(s) SubCutaneous daily      Other medications:   acetaminophen     Tablet .. 650 milliGRAM(s) Oral every 6 hours  Biotene Dry Mouth Oral Rinse 5 milliLiter(s) Swish and Spit five times a day  chlorhexidine 2% Cloths 1 Application(s) Topical <User Schedule>  phytonadione   Solution 5 milliGRAM(s) Oral <User Schedule>  sodium chloride 0.9%. 1000 milliLiter(s) IV Continuous <Continuous>  sodium chloride 0.9%. 500 milliLiter(s) IV Continuous <Continuous>      PRN:   acetaminophen     Tablet .. 650 milliGRAM(s) Oral every 6 hours PRN  prochlorperazine   Injectable 10 milliGRAM(s) IV Push every 6 hours PRN  senna 2 Tablet(s) Oral at bedtime PRN  sodium chloride 0.9% lock flush 10 milliLiter(s) IV Push every 1 hour PRN    A/P: 58 year old female with recently diagnosed multiple myeloma (3/2024) treated with 5 cycles of Jolene-VRd, admitted for an autologous pbsct with melphalan (200mg/m2) prep regimen.  Today is Day + 5    1. Infectious Disease:   Acyclovir 800 bid  Fluconazole to start when ANC < 500 and continue through engraftment    Levaquin to start when ANC < 1000   Bactrim SS for PCP prophylaxis post engraftment    CMV PCR weekly post engraftment through day + 100.    2. GI Prophylaxis:    Protonix 40mg po QD     3. Mouthcare - NS / NaHCO3 rinses, Skin care     4. Transfuse & replete electrolytes prn     5. IV hydration, daily weights, strict I&O, prn diuresis     6. PO intake as tolerated, nutrition follow up as needed, MVI, folic acid     7. Antiemetics, anti-diarrhea medications  prochlorperazine   Injectable 10 milliGRAM(s) IntraMuscular every 6 hours PRN    8. OOB as tolerated, physical therapy consult if needed     9. Monitor coags 2x week, vitamin K 5mg po BIW (Mon and Thurs)    10. Monitor closely for clinical changes, monitor for fevers     11. Emotional support provided, plan of care discussed and questions addressed     12. Patient education done regarding plan of care, restrictions and discharge planning     13. Continue regular social work input     I have written the above note for Dr. Liz who performed service with me in the room.   Aziza Booker NP-C ([phone])    I have seen and examined patient with NP, I agree with above note as scribed.

## 2024-10-10 LAB
ALBUMIN SERPL ELPH-MCNC: 4.1 G/DL — SIGNIFICANT CHANGE UP (ref 3.3–5)
ALP SERPL-CCNC: 76 U/L — SIGNIFICANT CHANGE UP (ref 40–120)
ALT FLD-CCNC: 19 U/L — SIGNIFICANT CHANGE UP (ref 10–45)
ANION GAP SERPL CALC-SCNC: 10 MMOL/L — SIGNIFICANT CHANGE UP (ref 5–17)
APTT BLD: 24.6 SEC — SIGNIFICANT CHANGE UP (ref 24.5–35.6)
AST SERPL-CCNC: 14 U/L — SIGNIFICANT CHANGE UP (ref 10–40)
BASOPHILS # BLD AUTO: 0.01 K/UL — SIGNIFICANT CHANGE UP (ref 0–0.2)
BASOPHILS NFR BLD AUTO: 0.2 % — SIGNIFICANT CHANGE UP (ref 0–2)
BILIRUB SERPL-MCNC: 0.7 MG/DL — SIGNIFICANT CHANGE UP (ref 0.2–1.2)
BUN SERPL-MCNC: 10 MG/DL — SIGNIFICANT CHANGE UP (ref 7–23)
CALCIUM SERPL-MCNC: 8.7 MG/DL — SIGNIFICANT CHANGE UP (ref 8.4–10.5)
CHLORIDE SERPL-SCNC: 99 MMOL/L — SIGNIFICANT CHANGE UP (ref 96–108)
CO2 SERPL-SCNC: 25 MMOL/L — SIGNIFICANT CHANGE UP (ref 22–31)
CREAT SERPL-MCNC: 0.67 MG/DL — SIGNIFICANT CHANGE UP (ref 0.5–1.3)
EGFR: 100 ML/MIN/1.73M2 — SIGNIFICANT CHANGE UP
EOSINOPHIL # BLD AUTO: 0 K/UL — SIGNIFICANT CHANGE UP (ref 0–0.5)
EOSINOPHIL NFR BLD AUTO: 0 % — SIGNIFICANT CHANGE UP (ref 0–6)
GLUCOSE SERPL-MCNC: 107 MG/DL — HIGH (ref 70–99)
HCT VFR BLD CALC: 30.8 % — LOW (ref 34.5–45)
HGB BLD-MCNC: 10.3 G/DL — LOW (ref 11.5–15.5)
IMM GRANULOCYTES NFR BLD AUTO: 1.7 % — HIGH (ref 0–0.9)
INR BLD: 1.03 RATIO — SIGNIFICANT CHANGE UP (ref 0.85–1.16)
LDH SERPL L TO P-CCNC: 163 U/L — SIGNIFICANT CHANGE UP (ref 50–242)
LYMPHOCYTES # BLD AUTO: 0.22 K/UL — LOW (ref 1–3.3)
LYMPHOCYTES # BLD AUTO: 3.4 % — LOW (ref 13–44)
MAGNESIUM SERPL-MCNC: 2.2 MG/DL — SIGNIFICANT CHANGE UP (ref 1.6–2.6)
MCHC RBC-ENTMCNC: 31.5 PG — SIGNIFICANT CHANGE UP (ref 27–34)
MCHC RBC-ENTMCNC: 33.4 GM/DL — SIGNIFICANT CHANGE UP (ref 32–36)
MCV RBC AUTO: 94.2 FL — SIGNIFICANT CHANGE UP (ref 80–100)
MONOCYTES # BLD AUTO: 0.02 K/UL — SIGNIFICANT CHANGE UP (ref 0–0.9)
MONOCYTES NFR BLD AUTO: 0.3 % — LOW (ref 2–14)
NEUTROPHILS # BLD AUTO: 6.17 K/UL — SIGNIFICANT CHANGE UP (ref 1.8–7.4)
NEUTROPHILS NFR BLD AUTO: 94.4 % — HIGH (ref 43–77)
NRBC # BLD: 0 /100 WBCS — SIGNIFICANT CHANGE UP (ref 0–0)
PHOSPHATE SERPL-MCNC: 4.3 MG/DL — SIGNIFICANT CHANGE UP (ref 2.5–4.5)
PLATELET # BLD AUTO: 103 K/UL — LOW (ref 150–400)
POTASSIUM SERPL-MCNC: 3.5 MMOL/L — SIGNIFICANT CHANGE UP (ref 3.5–5.3)
POTASSIUM SERPL-SCNC: 3.5 MMOL/L — SIGNIFICANT CHANGE UP (ref 3.5–5.3)
PROT SERPL-MCNC: 5.7 G/DL — LOW (ref 6–8.3)
PROTHROM AB SERPL-ACNC: 11.7 SEC — SIGNIFICANT CHANGE UP (ref 9.9–13.4)
RBC # BLD: 3.27 M/UL — LOW (ref 3.8–5.2)
RBC # FLD: 13.8 % — SIGNIFICANT CHANGE UP (ref 10.3–14.5)
SODIUM SERPL-SCNC: 134 MMOL/L — LOW (ref 135–145)
WBC # BLD: 6.53 K/UL — SIGNIFICANT CHANGE UP (ref 3.8–10.5)
WBC # FLD AUTO: 6.53 K/UL — SIGNIFICANT CHANGE UP (ref 3.8–10.5)

## 2024-10-10 RX ADMIN — Medication 5 MILLILITER(S): at 17:36

## 2024-10-10 RX ADMIN — Medication 5 MILLILITER(S): at 19:55

## 2024-10-10 RX ADMIN — Medication 10 MILLILITER(S): at 07:42

## 2024-10-10 RX ADMIN — Medication 10 MILLILITER(S): at 19:55

## 2024-10-10 RX ADMIN — PANTOPRAZOLE SODIUM 40 MILLIGRAM(S): 40 TABLET, DELAYED RELEASE ORAL at 06:00

## 2024-10-10 RX ADMIN — Medication 800 MILLIGRAM(S): at 05:48

## 2024-10-10 RX ADMIN — Medication 10 MILLILITER(S): at 12:40

## 2024-10-10 RX ADMIN — FILGRASTIM 300 MICROGRAM(S): 300 INJECTION, SOLUTION INTRAVENOUS at 12:40

## 2024-10-10 RX ADMIN — Medication 5 MILLILITER(S): at 12:41

## 2024-10-10 RX ADMIN — Medication 10 MILLILITER(S): at 17:30

## 2024-10-10 RX ADMIN — Medication 5 MILLIGRAM(S): at 10:23

## 2024-10-10 RX ADMIN — CHLORHEXIDINE GLUCONATE ORAL RINSE 1 APPLICATION(S): 1.2 SOLUTION DENTAL at 12:41

## 2024-10-10 RX ADMIN — Medication 5 MILLILITER(S): at 07:45

## 2024-10-10 RX ADMIN — Medication 800 MILLIGRAM(S): at 17:31

## 2024-10-10 NOTE — PROGRESS NOTE ADULT - NS ATTEND AMEND GEN_ALL_CORE FT
58 year old female with recently diagnosed multiple myeloma (3/2024) treated with 5 cycles of Jolene-VRd, admitted for an autologous pbsct with melphalan (200mg/m2) prep regimen  1. Admit to BMTU, now day + 6..mild nausea  ....completed post infusion hydration..weight noted, improved  2. TLC placement in IR    3. Melphalan hydration: start NS at 250ml / hr for 2 hours before and 2 hours post melphalan infusion    4. On day -1;  melphalan 200mg/m2 IV once administered over 30 minutes; initiate cryotherapy (one tbsp of ice in the mouth constantly) 15 minutes before, during and following the melphalan infusion    5. Stem cell infusion on day 0    6. Start zarxio 5mcg/kg/day daily on day + 5.  1. Antiviral prophylaxis with acyclovir 800 po BID to start on admission    2. Fluconazole to start when ANC < 500 and continue through engraftment    3. Levaquin to start when ANC < 1000   4. Bactrim SS for PCP prophylaxis post engraftment    5. CMV PCR weekly post engraftment through day + 100  6. Receiving transfusions as per guidelines  7. follow i/o, replete lytes prn  8. mouth and skin care    Patient was seen in IDR with nurses, ACP.   I personally examined the patient; data reviewed. I addressed patient's questions.     Over 35 minutes was spent in direct patient care and care coordination.

## 2024-10-10 NOTE — PROGRESS NOTE ADULT - SUBJECTIVE AND OBJECTIVE BOX
HPC Transplant Team                                                      Critical / Counseling Time Provided: 30 minutes                                                                                                                                                        Chief Complaint: Autologous peripheral blood stem cell transplant with high dose melphalan (200mg/m2) prep regimen for treatment of multiple myeloma    Disease: MM  Type of Transplant: Autologous  Conditioning Prep: Melphalan (200mg/m2)  ABO/CMV: B pos/CMV-    S: Patient seen and examined with HPC Transplant Team:       O: Vitals:   Vital Signs Last 24 Hrs  T(C): 36.7 (10 Oct 2024 05:50), Max: 37.1 (09 Oct 2024 12:00)  T(F): 98.1 (10 Oct 2024 05:50), Max: 98.7 (09 Oct 2024 12:00)  HR: 86 (10 Oct 2024 05:50) (86 - 99)  BP: 118/73 (10 Oct 2024 05:50) (118/73 - 133/80)  BP(mean): --  RR: 18 (10 Oct 2024 05:50) (18 - 18)  SpO2: 100% (10 Oct 2024 05:50) (97% - 100%)    Parameters below as of 10 Oct 2024 05:50  Patient On (Oxygen Delivery Method): room air      Admit weight: 58.9kg      Intake / Output:   10-09 @ 07:01  -  10-10 @ 07:00  --------------------------------------------------------  IN: 720 mL / OUT: 1850 mL / NET: -1130 mL      PE:   Oropharynx: No erythema or ulcerations   Oral Mucositis:                                                        stGstrstastdstest:st st1st CVS: +S1,S2, RRR   Lungs: CTA b/l  Abdomen: +BS x 3, soft, NT/ND  Extremities: no edema BLE's  Gastric Mucositis:                                                  stGstrstastdstest:st st1st Intestinal Mucositis:                                              stGstrstastdstest:st st1st Skin: no rash  TLC: CDI  Neuro: A&O x 3  Pain: 0    Labs:   CBC Full  -  ( 10 Oct 2024 06:59 )  WBC Count : 6.53 K/uL  Hemoglobin : 10.3 g/dL  Hematocrit : 30.8 %  Platelet Count - Automated : 103 K/uL  Mean Cell Volume : 94.2 fl  Mean Cell Hemoglobin : 31.5 pg  Mean Cell Hemoglobin Concentration : 33.4 gm/dL  Auto Neutrophil # : 6.17 K/uL  Auto Lymphocyte # : 0.22 K/uL  Auto Monocyte # : 0.02 K/uL  Auto Eosinophil # : 0.00 K/uL  Auto Basophil # : 0.01 K/uL  Auto Neutrophil % : 94.4 %  Auto Lymphocyte % : 3.4 %  Auto Monocyte % : 0.3 %  Auto Eosinophil % : 0.0 %  Auto Basophil % : 0.2 %                          10.3   6.53  )-----------( 103      ( 10 Oct 2024 06:59 )             30.8     10-10    134[L]  |  99  |  10  ----------------------------<  107[H]  3.5   |  25  |  0.67    Ca    8.7      10 Oct 2024 07:02  Phos  4.3     10-10  Mg     2.2     10-10    TPro  5.7[L]  /  Alb  4.1  /  TBili  0.7  /  DBili  x   /  AST  14  /  ALT  19  /  AlkPhos  76  10-10    PT/INR - ( 10 Oct 2024 07:01 )   PT: 11.7 sec;   INR: 1.03 ratio         PTT - ( 10 Oct 2024 07:01 )  PTT:24.6 sec  LIVER FUNCTIONS - ( 10 Oct 2024 07:02 )  Alb: 4.1 g/dL / Pro: 5.7 g/dL / ALK PHOS: 76 U/L / ALT: 19 U/L / AST: 14 U/L / GGT: x           Lactate Dehydrogenase, Serum: 163 U/L (10-10 @ 07:02)      Meds:   Antimicrobials:   acyclovir   Oral Tab/Cap 800 milliGRAM(s) Oral every 12 hours      Heme / Onc:       GI:  pantoprazole    Tablet 40 milliGRAM(s) Oral before breakfast  senna 2 Tablet(s) Oral at bedtime PRN  sodium bicarbonate Mouth Rinse 10 milliLiter(s) Swish and Spit five times a day      Cardiovascular:       Immunologic:   filgrastim-sndz (ZARXIO) Injectable 300 MICROGram(s) SubCutaneous daily      Other medications:   acetaminophen     Tablet .. 650 milliGRAM(s) Oral every 6 hours  Biotene Dry Mouth Oral Rinse 5 milliLiter(s) Swish and Spit five times a day  chlorhexidine 2% Cloths 1 Application(s) Topical <User Schedule>  phytonadione   Solution 5 milliGRAM(s) Oral <User Schedule>  sodium chloride 0.9%. 1000 milliLiter(s) IV Continuous <Continuous>  sodium chloride 0.9%. 500 milliLiter(s) IV Continuous <Continuous>      PRN:   acetaminophen     Tablet .. 650 milliGRAM(s) Oral every 6 hours PRN  prochlorperazine   Injectable 10 milliGRAM(s) IV Push every 6 hours PRN  senna 2 Tablet(s) Oral at bedtime PRN  sodium chloride 0.9% lock flush 10 milliLiter(s) IV Push every 1 hour PRN    A/P: 58 year old female with recently diagnosed multiple myeloma (3/2024) treated with 5 cycles of Jolene-VRd, admitted for an autologous pbsct with melphalan (200mg/m2) prep regimen.  Today is Day + 6    1. Infectious Disease:   Acyclovir 800 bid  Fluconazole to start when ANC < 500 and continue through engraftment    Levaquin to start when ANC < 1000   Bactrim SS for PCP prophylaxis post engraftment    CMV PCR weekly post engraftment through day + 100.    2. GI Prophylaxis:    Protonix 40mg po QD     3. Mouthcare - NS / NaHCO3 rinses, Skin care     4. Transfuse & replete electrolytes prn     5. IV hydration, daily weights, strict I&O, prn diuresis     6. PO intake as tolerated, nutrition follow up as needed    7. Antiemetics, anti-diarrhea medications  prochlorperazine   Injectable 10 milliGRAM(s) IV Push every 6 hours PRN    8. OOB as tolerated, physical therapy consult if needed     9. Monitor coags 2x week, vitamin K 5mg po BIW (Mon and Thurs)    10. Monitor closely for clinical changes, monitor for fevers     11. Emotional support provided, plan of care discussed and questions addressed     12. Patient education done regarding plan of care, restrictions and discharge planning     13. Continue regular social work input     I have written the above note for Dr. Liz who performed service with me in the room.   Aziza Booker NP-C ([phone])    I have seen and examined patient with NP, I agree with above note as scribed.        HPC Transplant Team                                                      Critical / Counseling Time Provided: 30 minutes                                                                                                                                                        Chief Complaint: Autologous peripheral blood stem cell transplant with high dose melphalan (200mg/m2) prep regimen for treatment of multiple myeloma    Disease: MM  Type of Transplant: Autologous  Conditioning Prep: Melphalan (200mg/m2)  ABO/CMV: B pos/CMV-    S: Patient seen and examined with HPC Transplant Team:   No complaints today     O: Vitals:   Vital Signs Last 24 Hrs  T(C): 36.7 (10 Oct 2024 05:50), Max: 37.1 (09 Oct 2024 12:00)  T(F): 98.1 (10 Oct 2024 05:50), Max: 98.7 (09 Oct 2024 12:00)  HR: 86 (10 Oct 2024 05:50) (86 - 99)  BP: 118/73 (10 Oct 2024 05:50) (118/73 - 133/80)  BP(mean): --  RR: 18 (10 Oct 2024 05:50) (18 - 18)  SpO2: 100% (10 Oct 2024 05:50) (97% - 100%)    Parameters below as of 10 Oct 2024 05:50  Patient On (Oxygen Delivery Method): room air      Admit weight: 58.9kg      Intake / Output:   10-09 @ 07:01  -  10-10 @ 07:00  --------------------------------------------------------  IN: 720 mL / OUT: 1850 mL / NET: -1130 mL      PE:   Oropharynx: No erythema or ulcerations   Oral Mucositis:                                                        stGstrstastdstest:st st1st CVS: +S1,S2, RRR   Lungs: CTA b/l  Abdomen: +BS x 3, soft, NT/ND  Extremities: no edema BLE's  Gastric Mucositis:                                                  stGstrstastdstest:st st1st Intestinal Mucositis:                                              stGstrstastdstest:st st1st Skin: no rash  TLC: CDI  Neuro: A&O x 3  Pain: 0    Labs:   CBC Full  -  ( 10 Oct 2024 06:59 )  WBC Count : 6.53 K/uL  Hemoglobin : 10.3 g/dL  Hematocrit : 30.8 %  Platelet Count - Automated : 103 K/uL  Mean Cell Volume : 94.2 fl  Mean Cell Hemoglobin : 31.5 pg  Mean Cell Hemoglobin Concentration : 33.4 gm/dL  Auto Neutrophil # : 6.17 K/uL  Auto Lymphocyte # : 0.22 K/uL  Auto Monocyte # : 0.02 K/uL  Auto Eosinophil # : 0.00 K/uL  Auto Basophil # : 0.01 K/uL  Auto Neutrophil % : 94.4 %  Auto Lymphocyte % : 3.4 %  Auto Monocyte % : 0.3 %  Auto Eosinophil % : 0.0 %  Auto Basophil % : 0.2 %                          10.3   6.53  )-----------( 103      ( 10 Oct 2024 06:59 )             30.8     10-10    134[L]  |  99  |  10  ----------------------------<  107[H]  3.5   |  25  |  0.67    Ca    8.7      10 Oct 2024 07:02  Phos  4.3     10-10  Mg     2.2     10-10    TPro  5.7[L]  /  Alb  4.1  /  TBili  0.7  /  DBili  x   /  AST  14  /  ALT  19  /  AlkPhos  76  10-10    PT/INR - ( 10 Oct 2024 07:01 )   PT: 11.7 sec;   INR: 1.03 ratio         PTT - ( 10 Oct 2024 07:01 )  PTT:24.6 sec  LIVER FUNCTIONS - ( 10 Oct 2024 07:02 )  Alb: 4.1 g/dL / Pro: 5.7 g/dL / ALK PHOS: 76 U/L / ALT: 19 U/L / AST: 14 U/L / GGT: x           Lactate Dehydrogenase, Serum: 163 U/L (10-10 @ 07:02)      Meds:   Antimicrobials:   acyclovir   Oral Tab/Cap 800 milliGRAM(s) Oral every 12 hours      Heme / Onc:       GI:  pantoprazole    Tablet 40 milliGRAM(s) Oral before breakfast  senna 2 Tablet(s) Oral at bedtime PRN  sodium bicarbonate Mouth Rinse 10 milliLiter(s) Swish and Spit five times a day      Cardiovascular:       Immunologic:   filgrastim-sndz (ZARXIO) Injectable 300 MICROGram(s) SubCutaneous daily      Other medications:   acetaminophen     Tablet .. 650 milliGRAM(s) Oral every 6 hours  Biotene Dry Mouth Oral Rinse 5 milliLiter(s) Swish and Spit five times a day  chlorhexidine 2% Cloths 1 Application(s) Topical <User Schedule>  phytonadione   Solution 5 milliGRAM(s) Oral <User Schedule>  sodium chloride 0.9%. 1000 milliLiter(s) IV Continuous <Continuous>  sodium chloride 0.9%. 500 milliLiter(s) IV Continuous <Continuous>      PRN:   acetaminophen     Tablet .. 650 milliGRAM(s) Oral every 6 hours PRN  prochlorperazine   Injectable 10 milliGRAM(s) IV Push every 6 hours PRN  senna 2 Tablet(s) Oral at bedtime PRN  sodium chloride 0.9% lock flush 10 milliLiter(s) IV Push every 1 hour PRN    A/P: 58 year old female with recently diagnosed multiple myeloma (3/2024) treated with 5 cycles of Jolene-VRd, admitted for an autologous pbsct with melphalan (200mg/m2) prep regimen.  Today is Day + 6    1. Infectious Disease:   Acyclovir 800 bid  Fluconazole to start when ANC < 500 and continue through engraftment    Levaquin to start when ANC < 1000   Bactrim SS for PCP prophylaxis post engraftment    CMV PCR weekly post engraftment through day + 100.    2. GI Prophylaxis:    Protonix 40mg po QD     3. Mouthcare - NS / NaHCO3 rinses, Skin care     4. Transfuse & replete electrolytes prn     5. IV hydration, daily weights, strict I&O, prn diuresis     6. PO intake as tolerated, nutrition follow up as needed    7. Antiemetics, anti-diarrhea medications  prochlorperazine   Injectable 10 milliGRAM(s) IV Push every 6 hours PRN    8. OOB as tolerated, physical therapy consult if needed     9. Monitor coags 2x week, vitamin K 5mg po BIW (Mon and Thurs)    10. Monitor closely for clinical changes, monitor for fevers     11. Emotional support provided, plan of care discussed and questions addressed     12. Patient education done regarding plan of care, restrictions and discharge planning     13. Continue regular social work input     I have written the above note for Dr. Liz who performed service with me in the room.   Aziza Booker NP-C ([phone])    I have seen and examined patient with NP, I agree with above note as scribed.

## 2024-10-11 ENCOUNTER — TRANSCRIPTION ENCOUNTER (OUTPATIENT)
Age: 59
End: 2024-10-11

## 2024-10-11 LAB
ALBUMIN SERPL ELPH-MCNC: 3.7 G/DL — SIGNIFICANT CHANGE UP (ref 3.3–5)
ALP SERPL-CCNC: 74 U/L — SIGNIFICANT CHANGE UP (ref 40–120)
ALT FLD-CCNC: 17 U/L — SIGNIFICANT CHANGE UP (ref 10–45)
ANION GAP SERPL CALC-SCNC: 11 MMOL/L — SIGNIFICANT CHANGE UP (ref 5–17)
AST SERPL-CCNC: 12 U/L — SIGNIFICANT CHANGE UP (ref 10–40)
BASOPHILS # BLD AUTO: 0 K/UL — SIGNIFICANT CHANGE UP (ref 0–0.2)
BASOPHILS NFR BLD AUTO: 0 % — SIGNIFICANT CHANGE UP (ref 0–2)
BILIRUB DIRECT SERPL-MCNC: <0.1 MG/DL — SIGNIFICANT CHANGE UP (ref 0–0.3)
BILIRUB INDIRECT FLD-MCNC: >0.4 MG/DL — SIGNIFICANT CHANGE UP (ref 0.2–1)
BILIRUB SERPL-MCNC: 0.5 MG/DL — SIGNIFICANT CHANGE UP (ref 0.2–1.2)
BLD GP AB SCN SERPL QL: POSITIVE — SIGNIFICANT CHANGE UP
BUN SERPL-MCNC: 10 MG/DL — SIGNIFICANT CHANGE UP (ref 7–23)
CALCIUM SERPL-MCNC: 8.5 MG/DL — SIGNIFICANT CHANGE UP (ref 8.4–10.5)
CHLORIDE SERPL-SCNC: 99 MMOL/L — SIGNIFICANT CHANGE UP (ref 96–108)
CO2 SERPL-SCNC: 26 MMOL/L — SIGNIFICANT CHANGE UP (ref 22–31)
CREAT SERPL-MCNC: 0.72 MG/DL — SIGNIFICANT CHANGE UP (ref 0.5–1.3)
EGFR: 96 ML/MIN/1.73M2 — SIGNIFICANT CHANGE UP
EOSINOPHIL # BLD AUTO: 0 K/UL — SIGNIFICANT CHANGE UP (ref 0–0.5)
EOSINOPHIL NFR BLD AUTO: 0 % — SIGNIFICANT CHANGE UP (ref 0–6)
GLUCOSE SERPL-MCNC: 103 MG/DL — HIGH (ref 70–99)
HCT VFR BLD CALC: 28.5 % — LOW (ref 34.5–45)
HGB BLD-MCNC: 9.7 G/DL — LOW (ref 11.5–15.5)
LDH SERPL L TO P-CCNC: 149 U/L — SIGNIFICANT CHANGE UP (ref 50–242)
LYMPHOCYTES # BLD AUTO: 0.25 K/UL — LOW (ref 1–3.3)
LYMPHOCYTES # BLD AUTO: 46.3 % — HIGH (ref 13–44)
MAGNESIUM SERPL-MCNC: 2.1 MG/DL — SIGNIFICANT CHANGE UP (ref 1.6–2.6)
MANUAL SMEAR VERIFICATION: SIGNIFICANT CHANGE UP
MCHC RBC-ENTMCNC: 31.7 PG — SIGNIFICANT CHANGE UP (ref 27–34)
MCHC RBC-ENTMCNC: 34 GM/DL — SIGNIFICANT CHANGE UP (ref 32–36)
MCV RBC AUTO: 93.1 FL — SIGNIFICANT CHANGE UP (ref 80–100)
MONOCYTES # BLD AUTO: 0 K/UL — SIGNIFICANT CHANGE UP (ref 0–0.9)
MONOCYTES NFR BLD AUTO: 0 % — LOW (ref 2–14)
NEUTROPHILS # BLD AUTO: 0.29 K/UL — LOW (ref 1.8–7.4)
NEUTROPHILS NFR BLD AUTO: 53.7 % — SIGNIFICANT CHANGE UP (ref 43–77)
PHOSPHATE SERPL-MCNC: 4.4 MG/DL — SIGNIFICANT CHANGE UP (ref 2.5–4.5)
PLAT MORPH BLD: NORMAL — SIGNIFICANT CHANGE UP
PLATELET # BLD AUTO: 71 K/UL — LOW (ref 150–400)
POTASSIUM SERPL-MCNC: 3.6 MMOL/L — SIGNIFICANT CHANGE UP (ref 3.5–5.3)
POTASSIUM SERPL-SCNC: 3.6 MMOL/L — SIGNIFICANT CHANGE UP (ref 3.5–5.3)
PROT SERPL-MCNC: 5.5 G/DL — LOW (ref 6–8.3)
RBC # BLD: 3.06 M/UL — LOW (ref 3.8–5.2)
RBC # FLD: 13.4 % — SIGNIFICANT CHANGE UP (ref 10.3–14.5)
RBC BLD AUTO: SIGNIFICANT CHANGE UP
RH IG SCN BLD-IMP: POSITIVE — SIGNIFICANT CHANGE UP
SODIUM SERPL-SCNC: 136 MMOL/L — SIGNIFICANT CHANGE UP (ref 135–145)
WBC # BLD: 0.54 K/UL — CRITICAL LOW (ref 3.8–10.5)
WBC # FLD AUTO: 0.54 K/UL — CRITICAL LOW (ref 3.8–10.5)

## 2024-10-11 RX ORDER — ONDANSETRON HCL/PF 4 MG/2 ML
1 VIAL (ML) INJECTION
Qty: 42 | Refills: 0
Start: 2024-10-11 | End: 2024-10-24

## 2024-10-11 RX ORDER — FLUCONAZOLE 200 MG
400 TABLET ORAL DAILY
Refills: 0 | Status: DISCONTINUED | OUTPATIENT
Start: 2024-10-11 | End: 2024-10-16

## 2024-10-11 RX ORDER — VANCOMYCIN HCL-SODIUM CHLORIDE IV SOLN 1.5 GM/250ML-0.9% 1.5-0.9/25 GM/ML-%
125 SOLUTION INTRAVENOUS EVERY 12 HOURS
Refills: 0 | Status: DISCONTINUED | OUTPATIENT
Start: 2024-10-11 | End: 2024-10-16

## 2024-10-11 RX ADMIN — Medication 10 MILLILITER(S): at 20:41

## 2024-10-11 RX ADMIN — Medication 5 MILLILITER(S): at 16:18

## 2024-10-11 RX ADMIN — Medication 10 MILLILITER(S): at 00:31

## 2024-10-11 RX ADMIN — Medication 5 MILLILITER(S): at 20:41

## 2024-10-11 RX ADMIN — FILGRASTIM 300 MICROGRAM(S): 300 INJECTION, SOLUTION INTRAVENOUS at 11:58

## 2024-10-11 RX ADMIN — Medication 400 MILLIGRAM(S): at 11:57

## 2024-10-11 RX ADMIN — Medication 800 MILLIGRAM(S): at 05:51

## 2024-10-11 RX ADMIN — Medication 10 MILLILITER(S): at 16:18

## 2024-10-11 RX ADMIN — CHLORHEXIDINE GLUCONATE ORAL RINSE 1 APPLICATION(S): 1.2 SOLUTION DENTAL at 11:04

## 2024-10-11 RX ADMIN — Medication 5 MILLILITER(S): at 23:06

## 2024-10-11 RX ADMIN — VANCOMYCIN HCL-SODIUM CHLORIDE IV SOLN 1.5 GM/250ML-0.9% 125 MILLIGRAM(S): 1.5-0.9/25 SOLUTION at 17:55

## 2024-10-11 RX ADMIN — PANTOPRAZOLE SODIUM 40 MILLIGRAM(S): 40 TABLET, DELAYED RELEASE ORAL at 05:51

## 2024-10-11 RX ADMIN — Medication 10 MILLILITER(S): at 11:58

## 2024-10-11 RX ADMIN — Medication 5 MILLILITER(S): at 08:14

## 2024-10-11 RX ADMIN — Medication 5 MILLILITER(S): at 11:57

## 2024-10-11 RX ADMIN — Medication 5 MILLILITER(S): at 00:31

## 2024-10-11 RX ADMIN — Medication 10 MILLILITER(S): at 08:17

## 2024-10-11 RX ADMIN — Medication 10 MILLILITER(S): at 23:05

## 2024-10-11 RX ADMIN — Medication 800 MILLIGRAM(S): at 17:55

## 2024-10-11 NOTE — DISCHARGE NOTE PROVIDER - NSDCFUSCHEDAPPT_GEN_ALL_CORE_FT
Mylene Physician [facility]  [facility]  Scheduled Appointment: 10/17/2024    Willow Palacios Physician [facility]  [facility]  Scheduled Appointment: 10/17/2024

## 2024-10-11 NOTE — DISCHARGE NOTE PROVIDER - NSDCMRMEDTOKEN_GEN_ALL_CORE_FT
acyclovir 800 mg oral tablet: 1 tab(s) orally every 12 hours  Bactrim 400 mg-80 mg oral tablet: 1 tab(s) orally once a day  ondansetron 8 mg oral tablet: 1 tab(s) orally every 8 hours as needed for  nausea

## 2024-10-11 NOTE — DISCHARGE NOTE PROVIDER - NSDCCPCAREPLAN_GEN_ALL_CORE_FT
PRINCIPAL DISCHARGE DIAGNOSIS  Diagnosis: Stem cells transplant status  Assessment and Plan of Treatment: Return to hospital and or call your transplant MD if you have fever >100.4, experience intractable nausea, vomiting, abdominal pain, shortness of breathe, chest pain     PRINCIPAL DISCHARGE DIAGNOSIS  Diagnosis: Stem cells transplant status  Assessment and Plan of Treatment: Return to hospital and or call your transplant MD if you have fever >100.4, experience intractable nausea, vomiting, abdominal pain, shortness of breathe, chest pain       PRINCIPAL DISCHARGE DIAGNOSIS  Diagnosis: Stem cells transplant status  Assessment and Plan of Treatment: Return to hospital and or call your transplant MD if you have fever >100.4, experience intractable nausea, vomiting, abdominal pain, shortness of breathe, chest pain  Diet and activities as per [facility] discharge guidelines and safe food handling guidelines. NO RESTAURANT OR TAKE OUT FOOD AT THIS TIME, ONLY HOME COOKED PREPARED/FROZEN FOODS. You are allowed to have fresh baked pizza right out of the oven. This is the ONLY takeout food at this time.        SECONDARY DISCHARGE DIAGNOSES  Diagnosis: Need for prophylactic measure  Assessment and Plan of Treatment: Continue your prophylactic medications as directed by your doctor and / or pharmacist   Acyclovir - antiviral prophylaxis   Bactrim - PCP prophylaxis

## 2024-10-11 NOTE — DISCHARGE NOTE PROVIDER - CARE PROVIDER_API CALL
Willow Palacios  Hematology/Oncology  [address] [place] [id number]  Phone: [phone]  Fax: [phone]  Follow Up Time:

## 2024-10-11 NOTE — DISCHARGE NOTE PROVIDER - HOSPITAL COURSE
58 year old female with recently diagnosed multiple myeloma (3/2024) treated with 5 cycles of Jolene-VRd, admitted for an autologous pbsct with melphalan (200mg/m2) prep regimen. Tunnelled catheter was accessed, pt was started on IVF's (NS @ 200/hr) 2 hrs prior to Melphalan infusion. Cryotherapy was applied during infusion. Antiviral ppx with Acyclovir started on D-1. Strict I/O's, daily weights were monitored. Daily CBC, CMP were monitored, lytes were repleted prn, transfusional support for hgb <7, plts<10k. Antiemetics given prn, mouth rinses encouraged 5x/day Prophylactic antimicrobials (Levaquin, Fluconazole, po Vanco) were started when neutrophil count <500.   Neutrophil engraftment was noted on ____,  CMV pcr was sent  Pt is medically ready for outpatient follow up.     58 year old female with recently diagnosed multiple myeloma (3/2024) treated with 5 cycles of Jolene-VRd, admitted for an autologous pbsct with melphalan (200mg/m2) prep regimen. Tunnelled catheter was accessed, pt was started on IVF's (NS @ 200/hr) 2 hrs prior to Melphalan infusion. Cryotherapy was applied during infusion. Antiviral ppx with Acyclovir started on D-1. Strict I/O's, daily weights were monitored. Daily CBC, CMP were monitored, lytes were repleted prn, transfusional support for hgb <7, plts<10k. Antiemetics given prn, mouth rinses encouraged 5x/day Prophylactic antimicrobials (Levaquin, Fluconazole, po Vanco) were started when neutrophil count <500.     On 10/4/24, after premedications, Ms Tanner received 427ml of pooled, thawed, mobilized, plasma reduced HPC. Cell count as follows:  Total MNC's (x10^8/kg): 17  CD34+ Cells (x10^6/kg): 6.3  Cell Viability (%): 76  Pt tolerated infusion without adverse reactions.    Neutrophil engraftment was noted on 10/16/24,  CMV pcr was sent 10/16/24. Currently Ms. Tanner is stable for discharge with outpatient follow up at the [facility].

## 2024-10-11 NOTE — CHART NOTE - NSCHARTNOTEFT_GEN_A_CORE
NUTRITION FOLLOW UP NOTE    PATIENT SEEN FOR: BMTU Nutrition Follow up     SOURCE: [x] Patient  [x] Current Medical Record  [x] RN  [] Family/support person at bedside  [] Patient unavailable/inappropriate  [x] Other: Interdisciplinary Rounds     CHART REVIEWED/EVENTS NOTED.  [] No changes to nutrition care plan to note  [x] Nutrition Status: Admitted for an autologous pbsct with melphalan (200mg/m2) prep regimen. Transplant Day: 10/04/2024. Today is Day + 7    DIET ORDER:   Diet, Regular (10-03-24 @ 07:33) [Active]      CURRENT DIET ORDER IS:  [x] Appropriate:  [] Inadequate:  [] Other:    NUTRITION INTAKE/PROVISION:  [x] PO: Pt reports good appetite/PO intake; consuming >75% of most meals. Reports feeling a bump inside her throat with some discomfort when swallowing food/fluids. Is not amenable to receiving oral nutritional supplements at this time. Will trial protein-fortified smoothie of the day for lunch today.   [] Enteral Nutrition:  [] Parenteral Nutrition:    ANTHROPOMETRICS:  Drug Dosing Weight  Height (cm): 156 (03 Oct 2024 07:37)  Weight (kg): 58.9 (03 Oct 2024 07:37)  BMI (kg/m2): 24.2 (03 Oct 2024 07:37)  BSA (m2): 1.58 (03 Oct 2024 07:37)  Weights:   Daily Weight in k.9 (11 Oct 2024 08:37), 56.3 (10-07), Weight in k (10-06), Weight in k.1 (10-05), Weight in k.2 (10-04)   ** Weight fluctuating - Weight changes likely secondary to fluid shifts. RD to continue to monitor weight trends as able.     NUTRITIONALLY PERTINENT MEDICATIONS:  MEDICATIONS  (STANDING):  acetaminophen     Tablet .. 650 milliGRAM(s) Oral every 6 hours  acyclovir   Oral Tab/Cap 800 milliGRAM(s) Oral every 12 hours  Biotene Dry Mouth Oral Rinse 5 milliLiter(s) Swish and Spit five times a day  chlorhexidine 2% Cloths 1 Application(s) Topical <User Schedule>  filgrastim-sndz (ZARXIO) Injectable 300 MICROGram(s) SubCutaneous daily  fluconAZOLE   Tablet 400 milliGRAM(s) Oral daily  levoFLOXacin  Tablet 500 milliGRAM(s) Oral every 24 hours  pantoprazole    Tablet 40 milliGRAM(s) Oral before breakfast  phytonadione   Solution 5 milliGRAM(s) Oral <User Schedule>  sodium bicarbonate Mouth Rinse 10 milliLiter(s) Swish and Spit five times a day  vancomycin    Solution 125 milliGRAM(s) Oral every 12 hours      NUTRITIONALLY PERTINENT LABS:  10-11 @ 06:54: Na 136, BUN 10, Cr 0.72, [H], K+ 3.6, Phos 4.4, Mg 2.1, Alk Phos 74, ALT/SGPT 17, AST/SGOT 12, HbA1c --      Finger Sticks:      NUTRITIONALLY PERTINENT MEDICATIONS/LABS:  [x] Reviewed  [] Relevant notes on medications/labs:    EDEMA:  [x] Reviewed  [] Relevant notes:    GI/ I&O:  [x] Reviewed  [] Relevant notes:  [] Other:    SKIN:   [x] No pressure injuries documented, per nursing flowsheet  [] Pressure injury previously noted  [] Change in pressure injury documentation:  [] Other:    ESTIMATED NEEDS:  [x] No change:  [] Updated:  Energy: 4442-9754  kcal/day (30-35 kcal/kg)  Protein: 81.9-93.6  g/day (1.4-1.6 g/kg)  Fluid:   ml/day or [x] defer to team  Based on: dosing weight 58.5 kg     NUTRITION DIAGNOSIS:  [x] Prior Dx: Increased Nutrient Needs, Predicted Inadequate protein-energy intake  [] New Dx:    EDUCATION:  [x] Yes: Emphasized the importance of adequate kcal and protein intake, provided recommendations to optimize nutritional intake in case of decreased appetite, recommended small frequent meals by consuming nutrient-dense snacks between meals, to start with protein, and sips of supplement throughout the day.   [] Not appropriate/warranted    NUTRITION CARE PLAN:  1. Diet: regular diet   2. Supplements: Pt is not amenable to receiving oral nutritional supplements at this time. Will trial protein-fortified smoothie today.   3. Monitor and encourage PO intake. Encourage use of daily menus. Honor dietary preferences as expressed as able.     [] Achieved - Continue current nutrition intervention(s)  [] Current medical condition precludes nutrition intervention at this time.    MONITORING AND EVALUATION:   RD remains available upon request and will follow up per protocol.    Lashanda Bach RDN CDN (TEAMS)

## 2024-10-11 NOTE — DISCHARGE NOTE PROVIDER - NSDCFUADDAPPT_GEN_ALL_CORE_FT
You have the following appointments at the [facility]:    Thursday, 10/17/24   1:30pm with Dr. Palacios  Please arrive 1/2 hour early to your appointment

## 2024-10-11 NOTE — PROGRESS NOTE ADULT - NS ATTEND AMEND GEN_ALL_CORE FT
58 year old female with recently diagnosed multiple myeloma (3/2024) treated with 5 cycles of Jolene-VRd, admitted for an autologous pbsct with melphalan (200mg/m2) prep regimen  1. Admit to BMTU, now day + 6..mild nausea  ....completed post infusion hydration..weight noted, improved  2. TLC placement in IR    3. Melphalan hydration: start NS at 250ml / hr for 2 hours before and 2 hours post melphalan infusion    4. On day -1;  melphalan 200mg/m2 IV once administered over 30 minutes; initiate cryotherapy (one tbsp of ice in the mouth constantly) 15 minutes before, during and following the melphalan infusion    5. Stem cell infusion on day 0    6. Start zarxio 5mcg/kg/day daily on day + 5.  1. Antiviral prophylaxis with acyclovir 800 po BID to start on admission    2. Fluconazole to start when ANC < 500 and continue through engraftment    3. Levaquin to start when ANC < 1000   4. Bactrim SS for PCP prophylaxis post engraftment    5. CMV PCR weekly post engraftment through day + 100  6. Receiving transfusions as per guidelines  7. follow i/o, replete lytes prn  8. mouth and skin care    Patient was seen in IDR with nurses, ACP.   I personally examined the patient; data reviewed. I addressed patient's questions.     Over 35 minutes was spent in direct patient care and care coordination. 58 year old female with recently diagnosed multiple myeloma (3/2024) treated with 5 cycles of Jolene-VRd, admitted for an autologous pbsct with melphalan (200mg/m2) prep regimen  1. Admit to BMTU, now day + 7..mild nausea  ....completed post infusion hydration..weight noted, improved  2. TLC placement in IR    3. Melphalan hydration: start NS at 250ml / hr for 2 hours before and 2 hours post melphalan infusion    4. On day -1;  melphalan 200mg/m2 IV once administered over 30 minutes; initiate cryotherapy (one tbsp of ice in the mouth constantly) 15 minutes before, during and following the melphalan infusion    5. Stem cell infusion on day 0    6. Start zarxio 5mcg/kg/day daily on day + 5.  1. Antiviral prophylaxis with acyclovir 800 po BID to start on admission    2. Fluconazole to start when ANC < 500 and continue through engraftment    3. Levaquin to start when ANC < 1000   4. Bactrim SS for PCP prophylaxis post engraftment    5. CMV PCR weekly post engraftment through day + 100  6. Receiving transfusions as per guidelines  7. follow i/o, replete lytes prn  8. mouth and skin care    Patient was seen in IDR with nurses, ACP.   I personally examined the patient; data reviewed. I addressed patient's questions.     Over 35 minutes was spent in direct patient care and care coordination. 58 year old female with recently diagnosed multiple myeloma (3/2024) treated with 5 cycles of Jolene-VRd, admitted for an autologous pbsct with melphalan (200mg/m2) prep regimen  1. Admit to BMTU, now day + 7..mild nausea  ....completed post infusion hydration..weight noted, improved  2. TLC placement in IR    3. Melphalan hydration: start NS at 250ml / hr for 2 hours before and 2 hours post melphalan infusion    4. On day -1;  melphalan 200mg/m2 IV once administered over 30 minutes; initiate cryotherapy (one tbsp of ice in the mouth constantly) 15 minutes before, during and following the melphalan infusion    5. Stem cell infusion on day 0    6. Start zarxio 5mcg/kg/day daily on day + 5.  1. Antiviral prophylaxis with acyclovir 800 po BID to start on admission    2. Fluconazole to start when ANC < 500 and continue through engraftment- on 10/11   3. Levaquin to start when ANC < 1000; on 10/11; also start PO Vanco  4. Bactrim SS for PCP prophylaxis post engraftment    5. CMV PCR weekly post engraftment through day + 100  6. Receiving transfusions as per guidelines  7. follow i/o, replete lytes prn  8. mouth and skin care    Patient was seen in IDR with nurses, ACP.   I personally examined the patient; data reviewed. I addressed patient's questions.     Over 35 minutes was spent in direct patient care and care coordination.

## 2024-10-11 NOTE — DISCHARGE NOTE PROVIDER - ATTENDING DISCHARGE PHYSICAL EXAMINATION:
T(C): 37 (10-16-24 @ 17:00), Max: 37.1 (10-16-24 @ 05:30)  HR: 102 (10-16-24 @ 17:00) (95 - 106)  BP: 111/72 (10-16-24 @ 17:00) (109/71 - 128/76)  RR: 18 (10-16-24 @ 17:00) (18 - 18)  SpO2: 100% (10-16-24 @ 17:00) (98% - 100%)    CONSTITUTIONAL: Well groomed, no apparent distress  EYES: PERRLA and symmetric, EOMI, No conjunctival or scleral injection, non-icteric  ENMT: Oral mucosa with moist membranes. Normal dentition; no pharyngeal injection or exudates             NECK: Supple, symmetric and without tracheal deviation   RESP: No respiratory distress, no use of accessory muscles; CTA b/l, no WRR  CV: RRR, +S1S2, no MRG; no JVD; no peripheral edema  GI: Soft, NT, ND, no rebound, no guarding; no palpable masses; no hepatosplenomegaly; no hernia palpated  LYMPH: No cervical LAD or tenderness; no axillary LAD or tenderness; no inguinal LAD or tenderness  MSK: Normal gait; No digital clubbing or cyanosis; examination of the (head/neck/spine/ribs/pelvis, RUE, LUE, RLE, LLE) without misalignment,            Normal ROM without pain, no spinal tenderness, normal muscle strength/tone  SKIN: No rashes or ulcers noted; no subcutaneous nodules or induration palpable  NEURO: CN II-XII intact; normal reflexes in upper and lower extremities, sensation intact in upper and lower extremities b/l to light touch   PSYCH: Appropriate insight/judgment; A+O x 3, mood and affect appropriate, recent/remote memory intact

## 2024-10-11 NOTE — PROGRESS NOTE ADULT - SUBJECTIVE AND OBJECTIVE BOX
[facility] Transplant Team                                                      Critical / Counseling Time Provided: 30 minutes                                                                                                                                                        Chief Complaint: Autologous peripheral blood stem cell transplant with high dose melphalan (200mg/m2) prep regimen for treatment of multiple myeloma    Disease: MM  Type of Transplant: Autologous  Conditioning Prep: Melphalan (200mg/m2)  ABO/CMV: B pos/CMV-    S: Patient seen and examined with [facility]PC Transplant Team:     O:    Vital Signs Last 24 Hrs  T(C): 36.7 (11 Oct 2024 06:07), Max: 36.7 (11 Oct 2024 06:07)  T(F): 98 (11 Oct 2024 06:07), Max: 98 (11 Oct 2024 06:07)  HR: 93 (11 Oct 2024 06:07) (89 - 93)  BP: 122/73 (11 Oct 2024 06:07) (111/74 - 126/76)  BP(mean): --  RR: 18 (11 Oct 2024 06:07) (18 - 18)  SpO2: 98% (11 Oct 2024 06:07) (98% - 98%)    Parameters below as of 11 Oct 2024 06:07  Patient On (Oxygen Delivery Method): room air        Admit weight:     Daily Weight in k (10 Oct 2024 08:17)    Intake / Output:   10-10 @ 07:01  -  10- @ 07:00  --------------------------------------------------------  IN: 1633 mL / OUT: 3475 mL / NET: -1842 mL    PE:   Oropharynx: No erythema or ulcerations   Oral Mucositis:                                                        stGstrstastdstest:st st1st CVS: +S1,S2, RRR   Lungs: CTA b/l  Abdomen: +BS x 3, soft, NT/ND  Extremities: no edema BLE's  Gastric Mucositis:                                                  stGstrstastdstest:st st1st Intestinal Mucositis:                                              stGstrstastdstest:st st1st Skin: no rash  TLC: CDI  Neuro: A&O x 3  Pain: 0      Labs:     ---            Cultures:         Radiology:       Meds:   Antimicrobials:   acyclovir   Oral Tab/Cap 800 milliGRAM(s) Oral every 12 hours      Heme / Onc:       GI:  pantoprazole    Tablet 40 milliGRAM(s) Oral before breakfast  senna 2 Tablet(s) Oral at bedtime PRN  sodium bicarbonate Mouth Rinse 10 milliLiter(s) Swish and Spit five times a day      Cardiovascular:       Immunologic:   filgrastim-sndz (ZARXIO) Injectable 300 MICROGram(s) SubCutaneous daily      Other medications:   acetaminophen     Tablet .. 650 milliGRAM(s) Oral every 6 hours  Biotene Dry Mouth Oral Rinse 5 milliLiter(s) Swish and Spit five times a day  chlorhexidine 2% Cloths 1 Application(s) Topical <User Schedule>  phytonadione   Solution 5 milliGRAM(s) Oral <User Schedule>      PRN:   acetaminophen     Tablet .. 650 milliGRAM(s) Oral every 6 hours PRN  prochlorperazine   Injectable 10 milliGRAM(s) IV Push every 6 hours PRN  senna 2 Tablet(s) Oral at bedtime PRN  sodium chloride 0.9% lock flush 10 milliLiter(s) IV Push every 1 hour PRN    A/P: 58 year old female with recently diagnosed multiple myeloma (3/2024) treated with 5 cycles of Jolene-VRd, admitted for an autologous pbsct with melphalan (200mg/m2) prep regimen.  Today is Day +7    1. Infectious Disease:   Acyclovir 800 bid  Fluconazole to start when ANC < 500 and continue through engraftment    Levaquin to start when ANC < 1000   Bactrim SS for PCP prophylaxis post engraftment    CMV PCR weekly post engraftment through day + 100.    2. GI Prophylaxis:    Protonix 40mg po QD     3. Mouthcare - NS / NaHCO3 rinses, Skin care     4. Transfuse & replete electrolytes prn     5. IV hydration, daily weights, strict I&O, prn diuresis     6. PO intake as tolerated, nutrition follow up as needed    7. Antiemetics, anti-diarrhea medications  prochlorperazine   Injectable 10 milliGRAM(s) IV Push every 6 hours PRN    8. OOB as tolerated, physical therapy consult if needed     9. Monitor coags 2x week, vitamin K 5mg po BIW (Mon and Thurs)    10. Monitor closely for clinical changes, monitor for fevers     11. Emotional support provided, plan of care discussed and questions addressed     12. Patient education done regarding plan of care, restrictions and discharge planning     13. Continue regular social work input     I have written the above note for Dr. Liz who performed service with me in the room.   Jr Hsieh PA-C ([phone])    I have seen and examined patient with PA, I agree with above note as scribed.                      [facility] Transplant Team                                                      Critical / Counseling Time Provided: 30 minutes                                                                                                                                                        Chief Complaint: Autologous peripheral blood stem cell transplant with high dose melphalan (200mg/m2) prep regimen for treatment of multiple myeloma    Disease: MM  Type of Transplant: Autologous  Conditioning Prep: Melphalan (200mg/m2)  ABO/CMV: B pos/CMV-    S: Patient seen and examined with [facility]PC Transplant Team:   Has no complaints today    O:  Vital Signs Last 24 Hrs  T(C): 36.7 (11 Oct 2024 06:07), Max: 36.7 (11 Oct 2024 06:07)  T(F): 98 (11 Oct 2024 06:07), Max: 98 (11 Oct 2024 06:07)  HR: 93 (11 Oct 2024 06:07) (89 - 93)  BP: 122/73 (11 Oct 2024 06:07) (111/74 - 126/76)  BP(mean): --  RR: 18 (11 Oct 2024 06:07) (18 - 18)  SpO2: 98% (11 Oct 2024 06:07) (98% - 98%)    Parameters below as of 11 Oct 2024 06:07  Patient On (Oxygen Delivery Method): room air        Admit weight:     Daily Weight in k (10 Oct 2024 08:17)    Intake / Output:   10-10 @ 07:01  -  10-11 @ 07:00  --------------------------------------------------------  IN: 1633 mL / OUT: 3475 mL / NET: -1842 mL    PE:   Oropharynx: No erythema or ulcerations   Oral Mucositis:                                                        stGstrstastdstest:st st1st CVS: +S1,S2, RRR   Lungs: CTA b/l  Abdomen: +BS x 3, soft, NT/ND  Extremities: no edema BLE's  Gastric Mucositis:                                                  stGstrstastdstest:st st1st Intestinal Mucositis:                                              stGstrstastdstest:st st1st Skin: no rash  TLC: CDI  Neuro: A&O x 3  Pain: 0      Labs:                         9.7    0.54  )-----------( 71       ( 11 Oct 2024 06:54 )             28.5     10-11    136  |  99  |  10  ----------------------------<  103[H]  3.6   |  26  |  0.72    Ca    8.5      11 Oct 2024 06:54  Phos  4.4     10-11  Mg     2.1     10-11    TPro  5.5[L]  /  Alb  3.7  /  TBili  0.5  /  DBili  <0.1  /  AST  12  /  ALT  17  /  AlkPhos  74  10-      Cultures:   Culture Results:   No growth at 5 days (10.03.24 @ 14:59)    Culture Results:   No growth at 5 days (10.03.24 @ 10:45)      Meds:   Antimicrobials:   acyclovir   Oral Tab/Cap 800 milliGRAM(s) Oral every 12 hours      Heme / Onc:       GI:  pantoprazole    Tablet 40 milliGRAM(s) Oral before breakfast  senna 2 Tablet(s) Oral at bedtime PRN  sodium bicarbonate Mouth Rinse 10 milliLiter(s) Swish and Spit five times a day      Cardiovascular:       Immunologic:   filgrastim-sndz (ZARXIO) Injectable 300 MICROGram(s) SubCutaneous daily      Other medications:   acetaminophen     Tablet .. 650 milliGRAM(s) Oral every 6 hours  Biotene Dry Mouth Oral Rinse 5 milliLiter(s) Swish and Spit five times a day  chlorhexidine 2% Cloths 1 Application(s) Topical <User Schedule>  phytonadione   Solution 5 milliGRAM(s) Oral <User Schedule>      PRN:   acetaminophen     Tablet .. 650 milliGRAM(s) Oral every 6 hours PRN  prochlorperazine   Injectable 10 milliGRAM(s) IV Push every 6 hours PRN  senna 2 Tablet(s) Oral at bedtime PRN  sodium chloride 0.9% lock flush 10 milliLiter(s) IV Push every 1 hour PRN    A/P: 58 year old female with recently diagnosed multiple myeloma (3/2024) treated with 5 cycles of Jolene-VRd, admitted for an autologous pbsct with melphalan (200mg/m2) prep regimen.  Today is Day +7    1. Infectious Disease:   Acyclovir 800 bid  Fluconazole to start when ANC < 500 and continue through engraftment    Levaquin to start when ANC < 1000   Bactrim SS for PCP prophylaxis post engraftment    CMV PCR weekly post engraftment through day + 100.    2. GI Prophylaxis:    Protonix 40mg po QD     3. Mouthcare - NS / NaHCO3 rinses, Skin care     4. Transfuse & replete electrolytes prn     5. IV hydration, daily weights, strict I&O, prn diuresis     6. PO intake as tolerated, nutrition follow up as needed    7. Antiemetics, anti-diarrhea medications  prochlorperazine   Injectable 10 milliGRAM(s) IV Push every 6 hours PRN    8. OOB as tolerated, physical therapy consult if needed     9. Monitor coags 2x week, vitamin K 5mg po BIW (Mon and Thurs)    10. Monitor closely for clinical changes, monitor for fevers     11. Emotional support provided, plan of care discussed and questions addressed     12. Patient education done regarding plan of care, restrictions and discharge planning     13. Continue regular social work input     I have written the above note for Dr. Liz who performed service with me in the room.   Jr Hsieh PA-C ([phone])    I have seen and examined patient with PA, I agree with above note as scribed.

## 2024-10-12 LAB
ALBUMIN SERPL ELPH-MCNC: 3.7 G/DL — SIGNIFICANT CHANGE UP (ref 3.3–5)
ALP SERPL-CCNC: 71 U/L — SIGNIFICANT CHANGE UP (ref 40–120)
ALT FLD-CCNC: 14 U/L — SIGNIFICANT CHANGE UP (ref 10–45)
ANION GAP SERPL CALC-SCNC: 12 MMOL/L — SIGNIFICANT CHANGE UP (ref 5–17)
AST SERPL-CCNC: 13 U/L — SIGNIFICANT CHANGE UP (ref 10–40)
BILIRUB SERPL-MCNC: 0.6 MG/DL — SIGNIFICANT CHANGE UP (ref 0.2–1.2)
BUN SERPL-MCNC: 9 MG/DL — SIGNIFICANT CHANGE UP (ref 7–23)
CALCIUM SERPL-MCNC: 8.3 MG/DL — LOW (ref 8.4–10.5)
CHLORIDE SERPL-SCNC: 101 MMOL/L — SIGNIFICANT CHANGE UP (ref 96–108)
CO2 SERPL-SCNC: 25 MMOL/L — SIGNIFICANT CHANGE UP (ref 22–31)
CREAT SERPL-MCNC: 0.75 MG/DL — SIGNIFICANT CHANGE UP (ref 0.5–1.3)
EGFR: 91 ML/MIN/1.73M2 — SIGNIFICANT CHANGE UP
GLUCOSE SERPL-MCNC: 107 MG/DL — HIGH (ref 70–99)
HCT VFR BLD CALC: 27.4 % — LOW (ref 34.5–45)
HGB BLD-MCNC: 9.4 G/DL — LOW (ref 11.5–15.5)
LDH SERPL L TO P-CCNC: 153 U/L — SIGNIFICANT CHANGE UP (ref 50–242)
MAGNESIUM SERPL-MCNC: 2.1 MG/DL — SIGNIFICANT CHANGE UP (ref 1.6–2.6)
MCHC RBC-ENTMCNC: 31.9 PG — SIGNIFICANT CHANGE UP (ref 27–34)
MCHC RBC-ENTMCNC: 34.3 GM/DL — SIGNIFICANT CHANGE UP (ref 32–36)
MCV RBC AUTO: 92.9 FL — SIGNIFICANT CHANGE UP (ref 80–100)
NRBC # BLD: 0 /100 WBCS — SIGNIFICANT CHANGE UP (ref 0–0)
PHOSPHATE SERPL-MCNC: 4.1 MG/DL — SIGNIFICANT CHANGE UP (ref 2.5–4.5)
PLATELET # BLD AUTO: 46 K/UL — LOW (ref 150–400)
POTASSIUM SERPL-MCNC: 3.5 MMOL/L — SIGNIFICANT CHANGE UP (ref 3.5–5.3)
POTASSIUM SERPL-SCNC: 3.5 MMOL/L — SIGNIFICANT CHANGE UP (ref 3.5–5.3)
PROT SERPL-MCNC: 5.4 G/DL — LOW (ref 6–8.3)
RBC # BLD: 2.95 M/UL — LOW (ref 3.8–5.2)
RBC # FLD: 13.2 % — SIGNIFICANT CHANGE UP (ref 10.3–14.5)
SODIUM SERPL-SCNC: 138 MMOL/L — SIGNIFICANT CHANGE UP (ref 135–145)
WBC # BLD: 0.19 K/UL — CRITICAL LOW (ref 3.8–10.5)
WBC # FLD AUTO: 0.19 K/UL — CRITICAL LOW (ref 3.8–10.5)

## 2024-10-12 RX ORDER — BENZOCAINE AND LEVOMENTHOL 200; 5 MG/G; MG/G
1 SPRAY TOPICAL
Refills: 0 | Status: DISCONTINUED | OUTPATIENT
Start: 2024-10-12 | End: 2024-10-14

## 2024-10-12 RX ORDER — DIPHENHYDRAMINE HYDROCHLORIDE AND LIDOCAINE HYDROCHLORIDE AND ALUMINUM HYDROXIDE AND MAGNESIUM HYDRO
5 KIT
Refills: 0 | Status: DISCONTINUED | OUTPATIENT
Start: 2024-10-12 | End: 2024-10-15

## 2024-10-12 RX ADMIN — Medication 10 MILLILITER(S): at 13:15

## 2024-10-12 RX ADMIN — DIPHENHYDRAMINE HYDROCHLORIDE AND LIDOCAINE HYDROCHLORIDE AND ALUMINUM HYDROXIDE AND MAGNESIUM HYDRO 5 MILLILITER(S): KIT at 16:38

## 2024-10-12 RX ADMIN — CHLORHEXIDINE GLUCONATE ORAL RINSE 1 APPLICATION(S): 1.2 SOLUTION DENTAL at 16:00

## 2024-10-12 RX ADMIN — BENZOCAINE AND LEVOMENTHOL 1 LOZENGE: 200; 5 SPRAY TOPICAL at 13:14

## 2024-10-12 RX ADMIN — Medication 5 MILLILITER(S): at 16:36

## 2024-10-12 RX ADMIN — PANTOPRAZOLE SODIUM 40 MILLIGRAM(S): 40 TABLET, DELAYED RELEASE ORAL at 06:37

## 2024-10-12 RX ADMIN — Medication 5 MILLILITER(S): at 13:06

## 2024-10-12 RX ADMIN — DIPHENHYDRAMINE HYDROCHLORIDE AND LIDOCAINE HYDROCHLORIDE AND ALUMINUM HYDROXIDE AND MAGNESIUM HYDRO 5 MILLILITER(S): KIT at 13:09

## 2024-10-12 RX ADMIN — Medication 800 MILLIGRAM(S): at 18:39

## 2024-10-12 RX ADMIN — FILGRASTIM 300 MICROGRAM(S): 300 INJECTION, SOLUTION INTRAVENOUS at 16:37

## 2024-10-12 RX ADMIN — Medication 10 MILLILITER(S): at 16:36

## 2024-10-12 RX ADMIN — DIPHENHYDRAMINE HYDROCHLORIDE AND LIDOCAINE HYDROCHLORIDE AND ALUMINUM HYDROXIDE AND MAGNESIUM HYDRO 5 MILLILITER(S): KIT at 20:24

## 2024-10-12 RX ADMIN — Medication 10 MILLILITER(S): at 08:10

## 2024-10-12 RX ADMIN — VANCOMYCIN HCL-SODIUM CHLORIDE IV SOLN 1.5 GM/250ML-0.9% 125 MILLIGRAM(S): 1.5-0.9/25 SOLUTION at 06:37

## 2024-10-12 RX ADMIN — VANCOMYCIN HCL-SODIUM CHLORIDE IV SOLN 1.5 GM/250ML-0.9% 125 MILLIGRAM(S): 1.5-0.9/25 SOLUTION at 18:40

## 2024-10-12 RX ADMIN — Medication 400 MILLIGRAM(S): at 13:12

## 2024-10-12 RX ADMIN — Medication 5 MILLILITER(S): at 20:24

## 2024-10-12 RX ADMIN — Medication 5 MILLILITER(S): at 08:10

## 2024-10-12 RX ADMIN — Medication 800 MILLIGRAM(S): at 06:38

## 2024-10-12 RX ADMIN — Medication 10 MILLILITER(S): at 20:23

## 2024-10-12 RX ADMIN — BENZOCAINE AND LEVOMENTHOL 1 LOZENGE: 200; 5 SPRAY TOPICAL at 20:23

## 2024-10-12 RX ADMIN — BENZOCAINE AND LEVOMENTHOL 1 LOZENGE: 200; 5 SPRAY TOPICAL at 16:38

## 2024-10-12 NOTE — PROGRESS NOTE ADULT - NS ATTEND AMEND GEN_ALL_CORE FT
58 year old female with recently diagnosed multiple myeloma (3/2024) treated with 5 cycles of Jolene-VRd, admitted for an autologous pbsct with melphalan (200mg/m2) prep regimen  1. Admit to BMTU, now day + 7..mild nausea  ....completed post infusion hydration..weight noted, improved  2. TLC placement in IR    3. Melphalan hydration: start NS at 250ml / hr for 2 hours before and 2 hours post melphalan infusion    4. On day -1;  melphalan 200mg/m2 IV once administered over 30 minutes; initiate cryotherapy (one tbsp of ice in the mouth constantly) 15 minutes before, during and following the melphalan infusion    5. Stem cell infusion on day 0    6. Start zarxio 5mcg/kg/day daily on day + 5.  1. Antiviral prophylaxis with acyclovir 800 po BID to start on admission    2. Fluconazole to start when ANC < 500 and continue through engraftment- on 10/11   3. Levaquin to start when ANC < 1000; on 10/11; also start PO Vanco  4. Bactrim SS for PCP prophylaxis post engraftment    5. CMV PCR weekly post engraftment through day + 100  6. Receiving transfusions as per guidelines  7. follow i/o, replete lytes prn  8. mouth and skin care    Patient was seen in IDR with nurses, ACP.   I personally examined the patient; data reviewed. I addressed patient's questions.     Over 35 minutes was spent in direct patient care and care coordination. 58 year old female with recently diagnosed multiple myeloma (3/2024) treated with 5 cycles of Jolene-VRd, admitted for an autologous pbsct with melphalan (200mg/m2) prep regimen  1. Admit to BMTU, now day + 8  ...mild nausea  ....completed post infusion hydration..weight noted, improved  2. TLC placement in IR    3. Melphalan hydration: start NS at 250ml / hr for 2 hours before and 2 hours post melphalan infusion    4. On day -1;  melphalan 200mg/m2 IV once administered over 30 minutes; initiate cryotherapy (one tbsp of ice in the mouth constantly) 15 minutes before, during and following the melphalan infusion    5. Stem cell infusion on day 0    6. Start zarxio 5mcg/kg/day daily on day + 5.  1. Antiviral prophylaxis with acyclovir 800 po BID to start on admission    2. Fluconazole to start when ANC < 500 and continue through engraftment- on 10/11   3. Levaquin to start when ANC < 1000; on 10/11; also start PO Vanco  4. Bactrim SS for PCP prophylaxis post engraftment    5. CMV PCR weekly post engraftment through day + 100  6. Receiving transfusions as per guidelines  7. follow i/o, replete lytes prn  8. mouth and skin care    Patient was seen in IDR with nurses, ACP.   I personally examined the patient; data reviewed. I addressed patient's questions.     Over 35 minutes was spent in direct patient care and care coordination.

## 2024-10-12 NOTE — PROGRESS NOTE ADULT - SUBJECTIVE AND OBJECTIVE BOX
HPC Transplant Team                                                      Critical / Counseling Time Provided: 30 minutes                                                                                                                                                        Chief Complaint: Autologous peripheral blood stem cell transplant with high dose melphalan (200mg/m2) prep regimen for treatment of multiple myeloma    Disease: MM  Type of Transplant: Autologous  Conditioning Prep: Melphalan (200mg/m2)  ABO/CMV: B pos/CMV-    S: Patient seen and examined with HPC Transplant Team:       O:     Vital Signs Last 24 Hrs  T(C): 37.2 (12 Oct 2024 05:54), Max: 37.2 (12 Oct 2024 05:54)  T(F): 99 (12 Oct 2024 05:54), Max: 99 (12 Oct 2024 05:54)  HR: 106 (12 Oct 2024 05:54) (90 - 106)  BP: 128/74 (12 Oct 2024 05:54) (115/75 - 128/80)  BP(mean): --  RR: 18 (12 Oct 2024 05:54) (18 - 18)  SpO2: 98% (12 Oct 2024 05:54) (98% - 100%)    Parameters below as of 12 Oct 2024 05:54  Patient On (Oxygen Delivery Method): room air      Admit weight: 58.9kg    Daily Weight in k.7 (12 Oct 2024 07:35)    Intake / Output:   10-11 @ 07:  -  10-12 @ 07:00  --------------------------------------------------------  IN: 780 mL / OUT: 3550 mL / NET: -2770 mL    10-12 @ 07:  -  10-12 @ 11:01  --------------------------------------------------------  IN: 240 mL / OUT: 0 mL / NET: 240 mL          Labs:   CBC Full  -  ( 12 Oct 2024 07:12 )  WBC Count : 0.19 K/uL  Hemoglobin : 9.4 g/dL  Hematocrit : 27.4 %  Platelet Count - Automated : 46 K/uL  Mean Cell Volume : 92.9 fl  Mean Cell Hemoglobin : 31.9 pg  Mean Cell Hemoglobin Concentration : 34.3 gm/dL  Auto Neutrophil # : x  Auto Lymphocyte # : x  Auto Monocyte # : x  Auto Eosinophil # : x  Auto Basophil # : x  Auto Neutrophil % : x  Auto Lymphocyte % : x  Auto Monocyte % : x  Auto Eosinophil % : x  Auto Basophil % : x                          9.4    0.19  )-----------( 46       ( 12 Oct 2024 07:12 )             27.4     10-12    138  |  101  |  9   ----------------------------<  107[H]  3.5   |  25  |  0.75    Ca    8.3[L]      12 Oct 2024 07:12  Phos  4.1     10-12  Mg     2.1     10-12    TPro  5.4[L]  /  Alb  3.7  /  TBili  0.6  /  DBili  x   /  AST  13  /  ALT  14  /  AlkPhos  71  10-12      LIVER FUNCTIONS - ( 12 Oct 2024 07:12 )  Alb: 3.7 g/dL / Pro: 5.4 g/dL / ALK PHOS: 71 U/L / ALT: 14 U/L / AST: 13 U/L / GGT: x           Lactate Dehydrogenase, Serum: 153 U/L (10-12 @ 07:12)      Cultures:         Radiology:       Meds:   Antimicrobials:   acyclovir   Oral Tab/Cap 800 milliGRAM(s) Oral every 12 hours  fluconAZOLE   Tablet 400 milliGRAM(s) Oral daily  levoFLOXacin  Tablet 500 milliGRAM(s) Oral every 24 hours  vancomycin    Solution 125 milliGRAM(s) Oral every 12 hours      Heme / Onc:       GI:  pantoprazole    Tablet 40 milliGRAM(s) Oral before breakfast  senna 2 Tablet(s) Oral at bedtime PRN  sodium bicarbonate Mouth Rinse 10 milliLiter(s) Swish and Spit five times a day      Cardiovascular:       Immunologic:   filgrastim-sndz (ZARXIO) Injectable 300 MICROGram(s) SubCutaneous daily      Other medications:   acetaminophen     Tablet .. 650 milliGRAM(s) Oral every 6 hours  benzocaine/menthol Lozenge 1 Lozenge Oral five times a day  Biotene Dry Mouth Oral Rinse 5 milliLiter(s) Swish and Spit five times a day  chlorhexidine 2% Cloths 1 Application(s) Topical <User Schedule>  FIRST- Mouthwash  BLM 5 milliLiter(s) Swish and Spit five times a day  phytonadione   Solution 5 milliGRAM(s) Oral <User Schedule>      PRN:   acetaminophen     Tablet .. 650 milliGRAM(s) Oral every 6 hours PRN  prochlorperazine   Injectable 10 milliGRAM(s) IV Push every 6 hours PRN  senna 2 Tablet(s) Oral at bedtime PRN  sodium chloride 0.9% lock flush 10 milliLiter(s) IV Push every 1 hour PRN    A/P: 58 year old female with recently diagnosed multiple myeloma (3/2024) treated with 5 cycles of Jolene-VRd, admitted for an autologous pbsct with melphalan (200mg/m2) prep regimen.  Today is Day +8    1. Infectious Disease:   acyclovir   Oral Tab/Cap 800 milliGRAM(s) Oral every 12 hours  fluconAZOLE   Tablet 400 milliGRAM(s) Oral daily  levoFLOXacin  Tablet 500 milliGRAM(s) Oral every 24 hours  vancomycin    Solution 125 milliGRAM(s) Oral every 12 hours    CMV PCR weekly post engraftment through day + 100.    2. GI Prophylaxis:    Protonix 40mg po QD     3. Mouthcare - NS / NaHCO3 rinses, Skin care     4. Transfuse & replete electrolytes prn     5. IV hydration, daily weights, strict I&O, prn diuresis     6. PO intake as tolerated, nutrition follow up as needed    7. Antiemetics, anti-diarrhea medications  prochlorperazine   Injectable 10 milliGRAM(s) IV Push every 6 hours PRN    8. OOB as tolerated, physical therapy consult if needed     9. Monitor coags 2x week, vitamin K 5mg po BIW (Mon and Thurs)    10. Monitor closely for clinical changes, monitor for fevers     11. Emotional support provided, plan of care discussed and questions addressed     12. Patient education done regarding plan of care, restrictions and discharge planning     13. Continue regular social work input     I have written the above note for Dr. Liz who performed service with me in the room.   Jr Hsieh PA-C ([phone])    I have seen and examined patient with PA, I agree with above note as scribed.                      [facility] Transplant Team                                                      Critical / Counseling Time Provided: 30 minutes                                                                                                                                                        Chief Complaint: Autologous peripheral blood stem cell transplant with high dose melphalan (200mg/m2) prep regimen for treatment of multiple myeloma    Disease: MM  Type of Transplant: Autologous  Conditioning Prep: Melphalan (200mg/m2)  ABO/CMV: B pos/CMV-    S: Patient seen and examined with [facility] Transplant Team:   +Nausea, relieved with antiemetics  +loose stools    O: rest of ROS negative    Vital Signs Last 24 Hrs  T(C): 37.2 (12 Oct 2024 05:54), Max: 37.2 (12 Oct 2024 05:54)  T(F): 99 (12 Oct 2024 05:54), Max: 99 (12 Oct 2024 05:54)  HR: 106 (12 Oct 2024 05:54) (90 - 106)  BP: 128/74 (12 Oct 2024 05:54) (115/75 - 128/80)  BP(mean): --  RR: 18 (12 Oct 2024 05:54) (18 - 18)  SpO2: 98% (12 Oct 2024 05:54) (98% - 100%)    Parameters below as of 12 Oct 2024 05:54  Patient On (Oxygen Delivery Method): room air      Admit weight: 58.9kg    Daily Weight in k.7 (12 Oct 2024 07:35)    Intake / Output:   10-11 @ :  -  10-12 @ 07:00  --------------------------------------------------------  IN: 780 mL / OUT: 3550 mL / NET: -2770 mL    10-12 @ 07:  -  10-12 @ 11:01  --------------------------------------------------------  IN: 240 mL / OUT: 0 mL / NET: 240 mL    PE:   Oropharynx: No erythema or ulcerations   Oral Mucositis:                                                        stGstrstastdstest:st st1st CVS: +S1,S2, RRR   Lungs: CTA b/l  Abdomen: +BS x 3, soft, NT/ND  Extremities: no edema BLE's  Gastric Mucositis:                                                  stGstrstastdstest:st st1st Intestinal Mucositis:                                              stGstrstastdstest:st st1st Skin: no rash  TLC: CDI  Neuro: A&O x 3  Pain: 0    Labs:   CBC Full  -  ( 12 Oct 2024 07:12 )  WBC Count : 0.19 K/uL  Hemoglobin : 9.4 g/dL  Hematocrit : 27.4 %  Platelet Count - Automated : 46 K/uL  Mean Cell Volume : 92.9 fl  Mean Cell Hemoglobin : 31.9 pg  Mean Cell Hemoglobin Concentration : 34.3 gm/dL  Auto Neutrophil # : x  Auto Lymphocyte # : x  Auto Monocyte # : x  Auto Eosinophil # : x  Auto Basophil # : x  Auto Neutrophil % : x  Auto Lymphocyte % : x  Auto Monocyte % : x  Auto Eosinophil % : x  Auto Basophil % : x                          9.4    0.19  )-----------( 46       ( 12 Oct 2024 07:12 )             27.4     10-    138  |  101  |  9   ----------------------------<  107[H]  3.5   |  25  |  0.75    Ca    8.3[L]      12 Oct 2024 07:12  Phos  4.1     10-12  Mg     2.1     10-12    TPro  5.4[L]  /  Alb  3.7  /  TBili  0.6  /  DBili  x   /  AST  13  /  ALT  14  /  AlkPhos  71  10-12      LIVER FUNCTIONS - ( 12 Oct 2024 07:12 )  Alb: 3.7 g/dL / Pro: 5.4 g/dL / ALK PHOS: 71 U/L / ALT: 14 U/L / AST: 13 U/L / GGT: x           Lactate Dehydrogenase, Serum: 153 U/L (10-12 @ 07:12)      Cultures:         Radiology:       Meds:   Antimicrobials:   acyclovir   Oral Tab/Cap 800 milliGRAM(s) Oral every 12 hours  fluconAZOLE   Tablet 400 milliGRAM(s) Oral daily  levoFLOXacin  Tablet 500 milliGRAM(s) Oral every 24 hours  vancomycin    Solution 125 milliGRAM(s) Oral every 12 hours      Heme / Onc:       GI:  pantoprazole    Tablet 40 milliGRAM(s) Oral before breakfast  senna 2 Tablet(s) Oral at bedtime PRN  sodium bicarbonate Mouth Rinse 10 milliLiter(s) Swish and Spit five times a day      Cardiovascular:       Immunologic:   filgrastim-sndz (ZARXIO) Injectable 300 MICROGram(s) SubCutaneous daily      Other medications:   acetaminophen     Tablet .. 650 milliGRAM(s) Oral every 6 hours  benzocaine/menthol Lozenge 1 Lozenge Oral five times a day  Biotene Dry Mouth Oral Rinse 5 milliLiter(s) Swish and Spit five times a day  chlorhexidine 2% Cloths 1 Application(s) Topical <User Schedule>  FIRST- Mouthwash  BLM 5 milliLiter(s) Swish and Spit five times a day  phytonadione   Solution 5 milliGRAM(s) Oral <User Schedule>      PRN:   acetaminophen     Tablet .. 650 milliGRAM(s) Oral every 6 hours PRN  prochlorperazine   Injectable 10 milliGRAM(s) IV Push every 6 hours PRN  senna 2 Tablet(s) Oral at bedtime PRN  sodium chloride 0.9% lock flush 10 milliLiter(s) IV Push every 1 hour PRN    A/P: 58 year old female with recently diagnosed multiple myeloma (3/2024) treated with 5 cycles of Jolene-VRd, admitted for an autologous pbsct with melphalan (200mg/m2) prep regimen.  Today is Day +8    1. Infectious Disease:   acyclovir   Oral Tab/Cap 800 milliGRAM(s) Oral every 12 hours  fluconAZOLE   Tablet 400 milliGRAM(s) Oral daily  levoFLOXacin  Tablet 500 milliGRAM(s) Oral every 24 hours  vancomycin    Solution 125 milliGRAM(s) Oral every 12 hours    CMV PCR weekly post engraftment through day + 100.    2. GI Prophylaxis:    Protonix 40mg po QD     3. Mouthcare - NS / NaHCO3 rinses, Skin care     4. Transfuse & replete electrolytes prn     5. IV hydration, daily weights, strict I&O, prn diuresis     6. PO intake as tolerated, nutrition follow up as needed    7. Antiemetics, anti-diarrhea medications  prochlorperazine   Injectable 10 milliGRAM(s) IV Push every 6 hours PRN    8. OOB as tolerated, physical therapy consult if needed     9. Monitor coags 2x week, vitamin K 5mg po BIW (Mon and Thurs)    10. Monitor closely for clinical changes, monitor for fevers     11. Emotional support provided, plan of care discussed and questions addressed     12. Patient education done regarding plan of care, restrictions and discharge planning     13. Continue regular social work input     I have written the above note for Dr. Liz who performed service with me in the room.   Jr Hsieh PA-C ([phone])    I have seen and examined patient with PA, I agree with above note as scribed.

## 2024-10-13 LAB
ALBUMIN SERPL ELPH-MCNC: 3.6 G/DL — SIGNIFICANT CHANGE UP (ref 3.3–5)
ALP SERPL-CCNC: 69 U/L — SIGNIFICANT CHANGE UP (ref 40–120)
ALT FLD-CCNC: 14 U/L — SIGNIFICANT CHANGE UP (ref 10–45)
ANION GAP SERPL CALC-SCNC: 12 MMOL/L — SIGNIFICANT CHANGE UP (ref 5–17)
AST SERPL-CCNC: 10 U/L — SIGNIFICANT CHANGE UP (ref 10–40)
BILIRUB SERPL-MCNC: 0.6 MG/DL — SIGNIFICANT CHANGE UP (ref 0.2–1.2)
BUN SERPL-MCNC: 8 MG/DL — SIGNIFICANT CHANGE UP (ref 7–23)
CALCIUM SERPL-MCNC: 8.5 MG/DL — SIGNIFICANT CHANGE UP (ref 8.4–10.5)
CHLORIDE SERPL-SCNC: 101 MMOL/L — SIGNIFICANT CHANGE UP (ref 96–108)
CO2 SERPL-SCNC: 25 MMOL/L — SIGNIFICANT CHANGE UP (ref 22–31)
CREAT SERPL-MCNC: 0.74 MG/DL — SIGNIFICANT CHANGE UP (ref 0.5–1.3)
EGFR: 93 ML/MIN/1.73M2 — SIGNIFICANT CHANGE UP
GLUCOSE SERPL-MCNC: 97 MG/DL — SIGNIFICANT CHANGE UP (ref 70–99)
HCT VFR BLD CALC: 26.9 % — LOW (ref 34.5–45)
HGB BLD-MCNC: 9.3 G/DL — LOW (ref 11.5–15.5)
LDH SERPL L TO P-CCNC: 125 U/L — SIGNIFICANT CHANGE UP (ref 50–242)
MAGNESIUM SERPL-MCNC: 2 MG/DL — SIGNIFICANT CHANGE UP (ref 1.6–2.6)
MCHC RBC-ENTMCNC: 32.2 PG — SIGNIFICANT CHANGE UP (ref 27–34)
MCHC RBC-ENTMCNC: 34.6 GM/DL — SIGNIFICANT CHANGE UP (ref 32–36)
MCV RBC AUTO: 93.1 FL — SIGNIFICANT CHANGE UP (ref 80–100)
NRBC # BLD: 0 /100 WBCS — SIGNIFICANT CHANGE UP (ref 0–0)
PHOSPHATE SERPL-MCNC: 3.9 MG/DL — SIGNIFICANT CHANGE UP (ref 2.5–4.5)
PLATELET # BLD AUTO: 26 K/UL — LOW (ref 150–400)
POTASSIUM SERPL-MCNC: 3.4 MMOL/L — LOW (ref 3.5–5.3)
POTASSIUM SERPL-SCNC: 3.4 MMOL/L — LOW (ref 3.5–5.3)
PROT SERPL-MCNC: 5.3 G/DL — LOW (ref 6–8.3)
RBC # BLD: 2.89 M/UL — LOW (ref 3.8–5.2)
RBC # FLD: 13.2 % — SIGNIFICANT CHANGE UP (ref 10.3–14.5)
SODIUM SERPL-SCNC: 138 MMOL/L — SIGNIFICANT CHANGE UP (ref 135–145)
WBC # BLD: 0.25 K/UL — CRITICAL LOW (ref 3.8–10.5)
WBC # FLD AUTO: 0.25 K/UL — CRITICAL LOW (ref 3.8–10.5)

## 2024-10-13 RX ADMIN — BENZOCAINE AND LEVOMENTHOL 1 LOZENGE: 200; 5 SPRAY TOPICAL at 20:16

## 2024-10-13 RX ADMIN — VANCOMYCIN HCL-SODIUM CHLORIDE IV SOLN 1.5 GM/250ML-0.9% 125 MILLIGRAM(S): 1.5-0.9/25 SOLUTION at 05:22

## 2024-10-13 RX ADMIN — Medication 10 MILLILITER(S): at 20:16

## 2024-10-13 RX ADMIN — Medication 40 MILLIEQUIVALENT(S): at 08:42

## 2024-10-13 RX ADMIN — VANCOMYCIN HCL-SODIUM CHLORIDE IV SOLN 1.5 GM/250ML-0.9% 125 MILLIGRAM(S): 1.5-0.9/25 SOLUTION at 17:19

## 2024-10-13 RX ADMIN — CHLORHEXIDINE GLUCONATE ORAL RINSE 1 APPLICATION(S): 1.2 SOLUTION DENTAL at 07:39

## 2024-10-13 RX ADMIN — Medication 400 MILLIGRAM(S): at 11:56

## 2024-10-13 RX ADMIN — FILGRASTIM 300 MICROGRAM(S): 300 INJECTION, SOLUTION INTRAVENOUS at 11:56

## 2024-10-13 RX ADMIN — DIPHENHYDRAMINE HYDROCHLORIDE AND LIDOCAINE HYDROCHLORIDE AND ALUMINUM HYDROXIDE AND MAGNESIUM HYDRO 5 MILLILITER(S): KIT at 17:19

## 2024-10-13 RX ADMIN — Medication 5 MILLILITER(S): at 11:56

## 2024-10-13 RX ADMIN — Medication 5 MILLILITER(S): at 17:19

## 2024-10-13 RX ADMIN — Medication 5 MILLILITER(S): at 20:16

## 2024-10-13 RX ADMIN — DIPHENHYDRAMINE HYDROCHLORIDE AND LIDOCAINE HYDROCHLORIDE AND ALUMINUM HYDROXIDE AND MAGNESIUM HYDRO 5 MILLILITER(S): KIT at 20:17

## 2024-10-13 RX ADMIN — Medication 10 MILLILITER(S): at 07:38

## 2024-10-13 RX ADMIN — BENZOCAINE AND LEVOMENTHOL 1 LOZENGE: 200; 5 SPRAY TOPICAL at 17:19

## 2024-10-13 RX ADMIN — Medication 5 MILLILITER(S): at 00:44

## 2024-10-13 RX ADMIN — Medication 10 MILLILITER(S): at 00:44

## 2024-10-13 RX ADMIN — Medication 5 MILLILITER(S): at 07:38

## 2024-10-13 RX ADMIN — PANTOPRAZOLE SODIUM 40 MILLIGRAM(S): 40 TABLET, DELAYED RELEASE ORAL at 05:22

## 2024-10-13 RX ADMIN — Medication 10 MILLILITER(S): at 11:56

## 2024-10-13 RX ADMIN — Medication 800 MILLIGRAM(S): at 05:22

## 2024-10-13 RX ADMIN — Medication 10 MILLILITER(S): at 17:19

## 2024-10-13 RX ADMIN — DIPHENHYDRAMINE HYDROCHLORIDE AND LIDOCAINE HYDROCHLORIDE AND ALUMINUM HYDROXIDE AND MAGNESIUM HYDRO 5 MILLILITER(S): KIT at 00:44

## 2024-10-13 RX ADMIN — BENZOCAINE AND LEVOMENTHOL 1 LOZENGE: 200; 5 SPRAY TOPICAL at 07:38

## 2024-10-13 RX ADMIN — BENZOCAINE AND LEVOMENTHOL 1 LOZENGE: 200; 5 SPRAY TOPICAL at 11:56

## 2024-10-13 RX ADMIN — Medication 800 MILLIGRAM(S): at 17:19

## 2024-10-13 RX ADMIN — BENZOCAINE AND LEVOMENTHOL 1 LOZENGE: 200; 5 SPRAY TOPICAL at 00:44

## 2024-10-13 RX ADMIN — DIPHENHYDRAMINE HYDROCHLORIDE AND LIDOCAINE HYDROCHLORIDE AND ALUMINUM HYDROXIDE AND MAGNESIUM HYDRO 5 MILLILITER(S): KIT at 11:56

## 2024-10-13 RX ADMIN — DIPHENHYDRAMINE HYDROCHLORIDE AND LIDOCAINE HYDROCHLORIDE AND ALUMINUM HYDROXIDE AND MAGNESIUM HYDRO 5 MILLILITER(S): KIT at 07:38

## 2024-10-13 NOTE — PROGRESS NOTE ADULT - SUBJECTIVE AND OBJECTIVE BOX
HPC Transplant Team                                                      Critical / Counseling Time Provided: 30 minutes                                                                                                                                                        Chief Complaint: Autologous peripheral blood stem cell transplant with high dose melphalan (200mg/m2) prep regimen for treatment of multiple myeloma    Disease: MM  Type of Transplant: Autologous  Conditioning Prep: Melphalan (200mg/m2)  ABO/CMV: B pos/CMV-    S: Patient seen and examined with HPC Transplant Team:     O: Vitals:   See [facility] flow sheet for VS  Afebrile, VSS     Patient On (Oxygen Delivery Method): room air      Admit weight: 58.9kg       Intake / Output:   10/13/24- 1480 in, 1100 out net positive 380    PE:   Oropharynx: No erythema or ulcerations   Oral Mucositis:                                                        stGstrstastdstest:st st1st CVS: +S1,S2, RRR   Lungs: CTA b/l  Abdomen: +BS x 3, soft, NT/ND  Extremities: no edema BLE's  Gastric Mucositis:                                                  stGstrstastdstest:st st1st Intestinal Mucositis:                                              stGstrstastdstest:st st1st Skin: no rash  TLC: CDI  Neuro: A&O x 3  Pain: 0    Labs:   CBC Full  -  ( 13 Oct 2024 07:03 )  WBC Count : 0.25 K/uL  Hemoglobin : 9.3 g/dL  Hematocrit : 26.9 %  Platelet Count - Automated : 26 K/uL  Mean Cell Volume : 93.1 fl  Mean Cell Hemoglobin : 32.2 pg  Mean Cell Hemoglobin Concentration : 34.6 gm/dL  Auto Neutrophil # : x  Auto Lymphocyte # : x  Auto Monocyte # : x  Auto Eosinophil # : x  Auto Basophil # : x  Auto Neutrophil % : x  Auto Lymphocyte % : x  Auto Monocyte % : x  Auto Eosinophil % : x  Auto Basophil % : x                          9.3    0.25  )-----------( 26       ( 13 Oct 2024 07:03 )             26.9     10-13    138  |  101  |  8   ----------------------------<  97  3.4[L]   |  25  |  0.74    Ca    8.5      13 Oct 2024 07:03  Phos  3.9     10-13  Mg     2.0     10-13    TPro  5.3[L]  /  Alb  3.6  /  TBili  0.6  /  DBili  x   /  AST  10  /  ALT  14  /  AlkPhos  69  10-13    PT/INR - ( 14 Oct 2024 07:07 )   PT: 12.6 sec;   INR: 1.11 ratio         PTT - ( 14 Oct 2024 07:07 )  PTT:29.5 sec  LIVER FUNCTIONS - ( 13 Oct 2024 07:03 )  Alb: 3.6 g/dL / Pro: 5.3 g/dL / ALK PHOS: 69 U/L / ALT: 14 U/L / AST: 10 U/L / GGT: x             Meds:   Antimicrobials:   acyclovir   Oral Tab/Cap 800 milliGRAM(s) Oral every 12 hours  fluconAZOLE   Tablet 400 milliGRAM(s) Oral daily  levoFLOXacin  Tablet 500 milliGRAM(s) Oral every 24 hours  vancomycin    Solution 125 milliGRAM(s) Oral every 12 hours      Heme / Onc:       GI:  pantoprazole    Tablet 40 milliGRAM(s) Oral before breakfast  senna 2 Tablet(s) Oral at bedtime PRN  sodium bicarbonate Mouth Rinse 10 milliLiter(s) Swish and Spit five times a day      Cardiovascular:       Immunologic:   filgrastim-sndz (ZARXIO) Injectable 300 MICROGram(s) SubCutaneous daily      Other medications:   acetaminophen     Tablet .. 650 milliGRAM(s) Oral every 6 hours  benzocaine/menthol Lozenge 1 Lozenge Oral five times a day  Biotene Dry Mouth Oral Rinse 5 milliLiter(s) Swish and Spit five times a day  chlorhexidine 2% Cloths 1 Application(s) Topical <User Schedule>  FIRST- Mouthwash  BLM 5 milliLiter(s) Swish and Spit five times a day  phytonadione   Solution 5 milliGRAM(s) Oral <User Schedule>      PRN:   acetaminophen     Tablet .. 650 milliGRAM(s) Oral every 6 hours PRN  prochlorperazine   Injectable 10 milliGRAM(s) IV Push every 6 hours PRN  senna 2 Tablet(s) Oral at bedtime PRN  sodium chloride 0.9% lock flush 10 milliLiter(s) IV Push every 1 hour PRN    A/P: 58 year old female with recently diagnosed multiple myeloma (3/2024) treated with 5 cycles of Jolene-VRd, admitted for an autologous pbsct with melphalan (200mg/m2) prep regimen.  Today is Day +9    1. Infectious Disease:   acyclovir   Oral Tab/Cap 800 milliGRAM(s) Oral every 12 hours  fluconAZOLE   Tablet 400 milliGRAM(s) Oral daily  levoFLOXacin  Tablet 500 milliGRAM(s) Oral every 24 hours  vancomycin    Solution 125 milliGRAM(s) Oral every 12 hours    CMV PCR weekly post engraftment through day + 100.    2. GI Prophylaxis:    Protonix 40mg po QD     3. Mouthcare - NS / NaHCO3 rinses, Skin care     4. Transfuse & replete electrolytes prn     5. IV hydration, daily weights, strict I&O, prn diuresis     6. PO intake as tolerated, nutrition follow up as needed    7. Antiemetics, anti-diarrhea medications  prochlorperazine   Injectable 10 milliGRAM(s) IV Push every 6 hours PRN    8. OOB as tolerated, physical therapy consult if needed     9. Monitor coags 2x week, vitamin K 5mg po BIW (Mon and Thurs)    10. Monitor closely for clinical changes, monitor for fevers     11. Emotional support provided, plan of care discussed and questions addressed     12. Patient education done regarding plan of care, restrictions and discharge planning     13. Continue regular social work input     I have written the above note for Dr. Liz who performed service with me in the room.   Jr Hsieh PA-C / Aziza Booker NP-C ([phone])    I have seen and examined patient with PA, I agree with above note as scribed.

## 2024-10-13 NOTE — PROGRESS NOTE ADULT - NS ATTEND AMEND GEN_ALL_CORE FT
58 year old female with recently diagnosed multiple myeloma (3/2024) treated with 5 cycles of Jolene-VRd, admitted for an autologous pbsct with melphalan (200mg/m2) prep regimen  1. Admit to BMTU, now day + 8  ...mild nausea  ....completed post infusion hydration..weight noted, improved  2. TLC placement in IR    3. Melphalan hydration: start NS at 250ml / hr for 2 hours before and 2 hours post melphalan infusion    4. On day -1;  melphalan 200mg/m2 IV once administered over 30 minutes; initiate cryotherapy (one tbsp of ice in the mouth constantly) 15 minutes before, during and following the melphalan infusion    5. Stem cell infusion on day 0    6. Start zarxio 5mcg/kg/day daily on day + 5.  1. Antiviral prophylaxis with acyclovir 800 po BID to start on admission    2. Fluconazole to start when ANC < 500 and continue through engraftment- on 10/11   3. Levaquin to start when ANC < 1000; on 10/11; also start PO Vanco  4. Bactrim SS for PCP prophylaxis post engraftment    5. CMV PCR weekly post engraftment through day + 100  6. Receiving transfusions as per guidelines  7. follow i/o, replete lytes prn  8. mouth and skin care    Patient was seen in IDR with nurses, ACP.   I personally examined the patient; data reviewed. I addressed patient's questions.     Over 35 minutes was spent in direct patient care and care coordination.

## 2024-10-14 LAB
ALBUMIN SERPL ELPH-MCNC: 3.7 G/DL — SIGNIFICANT CHANGE UP (ref 3.3–5)
ALP SERPL-CCNC: 68 U/L — SIGNIFICANT CHANGE UP (ref 40–120)
ALT FLD-CCNC: 14 U/L — SIGNIFICANT CHANGE UP (ref 10–45)
ANION GAP SERPL CALC-SCNC: 10 MMOL/L — SIGNIFICANT CHANGE UP (ref 5–17)
APTT BLD: 29.5 SEC — SIGNIFICANT CHANGE UP (ref 24.5–35.6)
AST SERPL-CCNC: 11 U/L — SIGNIFICANT CHANGE UP (ref 10–40)
BILIRUB DIRECT SERPL-MCNC: <0.1 MG/DL — SIGNIFICANT CHANGE UP (ref 0–0.3)
BILIRUB INDIRECT FLD-MCNC: >0.5 MG/DL — SIGNIFICANT CHANGE UP (ref 0.2–1)
BILIRUB SERPL-MCNC: 0.6 MG/DL — SIGNIFICANT CHANGE UP (ref 0.2–1.2)
BUN SERPL-MCNC: 6 MG/DL — LOW (ref 7–23)
CALCIUM SERPL-MCNC: 8.6 MG/DL — SIGNIFICANT CHANGE UP (ref 8.4–10.5)
CHLORIDE SERPL-SCNC: 101 MMOL/L — SIGNIFICANT CHANGE UP (ref 96–108)
CO2 SERPL-SCNC: 25 MMOL/L — SIGNIFICANT CHANGE UP (ref 22–31)
CREAT SERPL-MCNC: 0.78 MG/DL — SIGNIFICANT CHANGE UP (ref 0.5–1.3)
EGFR: 87 ML/MIN/1.73M2 — SIGNIFICANT CHANGE UP
GLUCOSE SERPL-MCNC: 92 MG/DL — SIGNIFICANT CHANGE UP (ref 70–99)
HCT VFR BLD CALC: 27.3 % — LOW (ref 34.5–45)
HGB BLD-MCNC: 9.4 G/DL — LOW (ref 11.5–15.5)
INR BLD: 1.11 RATIO — SIGNIFICANT CHANGE UP (ref 0.85–1.16)
LDH SERPL L TO P-CCNC: 120 U/L — SIGNIFICANT CHANGE UP (ref 50–242)
MAGNESIUM SERPL-MCNC: 2.1 MG/DL — SIGNIFICANT CHANGE UP (ref 1.6–2.6)
MCHC RBC-ENTMCNC: 32.2 PG — SIGNIFICANT CHANGE UP (ref 27–34)
MCHC RBC-ENTMCNC: 34.4 GM/DL — SIGNIFICANT CHANGE UP (ref 32–36)
MCV RBC AUTO: 93.5 FL — SIGNIFICANT CHANGE UP (ref 80–100)
NRBC # BLD: 0 /100 WBCS — SIGNIFICANT CHANGE UP (ref 0–0)
PHOSPHATE SERPL-MCNC: 3.8 MG/DL — SIGNIFICANT CHANGE UP (ref 2.5–4.5)
PLATELET # BLD AUTO: 15 K/UL — CRITICAL LOW (ref 150–400)
POTASSIUM SERPL-MCNC: 3.7 MMOL/L — SIGNIFICANT CHANGE UP (ref 3.5–5.3)
POTASSIUM SERPL-SCNC: 3.7 MMOL/L — SIGNIFICANT CHANGE UP (ref 3.5–5.3)
PROT SERPL-MCNC: 5.4 G/DL — LOW (ref 6–8.3)
PROTHROM AB SERPL-ACNC: 12.6 SEC — SIGNIFICANT CHANGE UP (ref 9.9–13.4)
RBC # BLD: 2.92 M/UL — LOW (ref 3.8–5.2)
RBC # FLD: 13.2 % — SIGNIFICANT CHANGE UP (ref 10.3–14.5)
SODIUM SERPL-SCNC: 136 MMOL/L — SIGNIFICANT CHANGE UP (ref 135–145)
WBC # BLD: 0.41 K/UL — CRITICAL LOW (ref 3.8–10.5)
WBC # FLD AUTO: 0.41 K/UL — CRITICAL LOW (ref 3.8–10.5)

## 2024-10-14 PROCEDURE — 99233 SBSQ HOSP IP/OBS HIGH 50: CPT

## 2024-10-14 RX ADMIN — Medication 10 MILLILITER(S): at 19:23

## 2024-10-14 RX ADMIN — VANCOMYCIN HCL-SODIUM CHLORIDE IV SOLN 1.5 GM/250ML-0.9% 125 MILLIGRAM(S): 1.5-0.9/25 SOLUTION at 05:08

## 2024-10-14 RX ADMIN — FILGRASTIM 300 MICROGRAM(S): 300 INJECTION, SOLUTION INTRAVENOUS at 11:55

## 2024-10-14 RX ADMIN — Medication 5 MILLILITER(S): at 11:56

## 2024-10-14 RX ADMIN — DIPHENHYDRAMINE HYDROCHLORIDE AND LIDOCAINE HYDROCHLORIDE AND ALUMINUM HYDROXIDE AND MAGNESIUM HYDRO 5 MILLILITER(S): KIT at 18:17

## 2024-10-14 RX ADMIN — BENZOCAINE AND LEVOMENTHOL 1 LOZENGE: 200; 5 SPRAY TOPICAL at 00:09

## 2024-10-14 RX ADMIN — DIPHENHYDRAMINE HYDROCHLORIDE AND LIDOCAINE HYDROCHLORIDE AND ALUMINUM HYDROXIDE AND MAGNESIUM HYDRO 5 MILLILITER(S): KIT at 11:56

## 2024-10-14 RX ADMIN — DIPHENHYDRAMINE HYDROCHLORIDE AND LIDOCAINE HYDROCHLORIDE AND ALUMINUM HYDROXIDE AND MAGNESIUM HYDRO 5 MILLILITER(S): KIT at 00:09

## 2024-10-14 RX ADMIN — Medication 5 MILLILITER(S): at 07:49

## 2024-10-14 RX ADMIN — Medication 400 MILLIGRAM(S): at 11:55

## 2024-10-14 RX ADMIN — Medication 800 MILLIGRAM(S): at 18:17

## 2024-10-14 RX ADMIN — Medication 10 MILLILITER(S): at 07:49

## 2024-10-14 RX ADMIN — Medication 10 MILLILITER(S): at 11:56

## 2024-10-14 RX ADMIN — DIPHENHYDRAMINE HYDROCHLORIDE AND LIDOCAINE HYDROCHLORIDE AND ALUMINUM HYDROXIDE AND MAGNESIUM HYDRO 5 MILLILITER(S): KIT at 07:49

## 2024-10-14 RX ADMIN — Medication 10 MILLILITER(S): at 18:17

## 2024-10-14 RX ADMIN — PANTOPRAZOLE SODIUM 40 MILLIGRAM(S): 40 TABLET, DELAYED RELEASE ORAL at 05:09

## 2024-10-14 RX ADMIN — Medication 5 MILLIGRAM(S): at 10:16

## 2024-10-14 RX ADMIN — Medication 5 MILLILITER(S): at 00:08

## 2024-10-14 RX ADMIN — Medication 5 MILLILITER(S): at 18:17

## 2024-10-14 RX ADMIN — Medication 10 MILLILITER(S): at 00:09

## 2024-10-14 RX ADMIN — CHLORHEXIDINE GLUCONATE ORAL RINSE 1 APPLICATION(S): 1.2 SOLUTION DENTAL at 12:00

## 2024-10-14 RX ADMIN — VANCOMYCIN HCL-SODIUM CHLORIDE IV SOLN 1.5 GM/250ML-0.9% 125 MILLIGRAM(S): 1.5-0.9/25 SOLUTION at 18:17

## 2024-10-14 RX ADMIN — Medication 800 MILLIGRAM(S): at 05:08

## 2024-10-14 RX ADMIN — Medication 5 MILLILITER(S): at 19:23

## 2024-10-14 NOTE — PROGRESS NOTE ADULT - NS ATTEND AMEND GEN_ALL_CORE FT
58 year old female with recently diagnosed multiple myeloma (3/2024) treated with 5 cycles of Jolene-VRd, admitted for an autologous pbsct with melphalan (200mg/m2) prep regimen  1. Admit to BMTU, now day + 10  ...mild nausea  ....completed post infusion hydration..weight noted, improved  2. TLC placement in IR    3. Melphalan hydration: start NS at 250ml / hr for 2 hours before and 2 hours post melphalan infusion    4. On day -1;  melphalan 200mg/m2 IV once administered over 30 minutes; initiate cryotherapy (one tbsp of ice in the mouth constantly) 15 minutes before, during and following the melphalan infusion    5. Stem cell infusion on day 0    6. Start zarxio 5mcg/kg/day daily on day + 5.  1. Antiviral prophylaxis with acyclovir 800 po BID to start on admission    2. Fluconazole to start when ANC < 500 and continue through engraftment- on 10/11   3. Levaquin to start when ANC < 1000; on 10/11; also start PO Vanco  4. Bactrim SS for PCP prophylaxis post engraftment    5. CMV PCR weekly post engraftment through day + 100  6. Receiving transfusions as per guidelines  7. follow i/o, replete lytes prn  8. mouth and skin care    Patient was seen in IDR with nurses, ACP.   I personally examined the patient; data reviewed. I addressed patient's questions.     Over 35 minutes was spent in direct patient care and care coordination. 58 year old female admitted for auto-HSCT for  multiple myeloma with melphalan (200mg/m2) prep regimen. Day +10     Overall, patient is doing well with no specific complaints.   wbc with small uptrend, continue to monitor on filgrastim; start planning for discharge in coming days.   continue supportive care and anti-infectious prophylaxis   All questions were answered.     Patient was seen in IDR with nurses, ACP and Pharmacist.   I personally examined the patient; data reviewed. I addressed patient's questions.

## 2024-10-14 NOTE — PROGRESS NOTE ADULT - SUBJECTIVE AND OBJECTIVE BOX
HPC Transplant Team                                                      Critical / Counseling Time Provided: 30 minutes                                                                                                                                                        Chief Complaint: Autologous peripheral blood stem cell transplant with high dose melphalan (200mg/m2) prep regimen for treatment of multiple myeloma    Disease: MM  Type of Transplant: Autologous  Conditioning Prep: Melphalan (200mg/m2)  ABO/CMV: B pos/CMV-    S: Patient seen and examined with HPC Transplant Team:     O: Vitals:   Vital Signs Last 24 Hrs  T(C): 36.9 (14 Oct 2024 05:21), Max: 37.6 (13 Oct 2024 18:58)  T(F): 98.4 (14 Oct 2024 05:21), Max: 99.6 (13 Oct 2024 18:58)  HR: 94 (14 Oct 2024 05:21) (94 - 107)  BP: 131/74 (14 Oct 2024 05:21) (109/72 - 131/74)  BP(mean): --  RR: 18 (14 Oct 2024 05:21) (18 - 18)  SpO2: 98% (14 Oct 2024 05:21) (97% - 99%)    Parameters below as of 14 Oct 2024 05:21  Patient On (Oxygen Delivery Method): room air      Admit weight: 58.9kg   Daily Weight in k.9 (14 Oct 2024 07:59)    Intake / Output:   10-13 @ 07:01  -  10-14 @ 07:00  --------------------------------------------------------  IN: 1953 mL / OUT: 2000 mL / NET: -47 mL      PE:   Oropharynx: No erythema or ulcerations   Oral Mucositis:                                                        stGstrstastdstest:st st1st CVS: +S1,S2, RRR   Lungs: CTA b/l  Abdomen: +BS x 3, soft, NT/ND  Extremities: no edema BLE's  Gastric Mucositis:                                                  stGstrstastdstest:st st1st Intestinal Mucositis:                                              stGstrstastdstest:st st1st Skin: no rash  TLC: CDI  Neuro: A&O x 3  Pain: 0    Labs:   CBC Full  -  ( 14 Oct 2024 07:07 )  WBC Count : 0.41 K/uL  Hemoglobin : 9.4 g/dL  Hematocrit : 27.3 %  Platelet Count - Automated : 15 K/uL  Mean Cell Volume : 93.5 fl  Mean Cell Hemoglobin : 32.2 pg  Mean Cell Hemoglobin Concentration : 34.4 gm/dL  Auto Neutrophil # : x  Auto Lymphocyte # : x  Auto Monocyte # : x  Auto Eosinophil # : x  Auto Basophil # : x  Auto Neutrophil % : x  Auto Lymphocyte % : x  Auto Monocyte % : x  Auto Eosinophil % : x  Auto Basophil % : x                          9.4    0.41  )-----------( 15       ( 14 Oct 2024 07:07 )             27.3     10-14    136  |  101  |  6[L]  ----------------------------<  92  3.7   |  25  |  0.78    Ca    8.6      14 Oct 2024 07:06  Phos  3.8     10-14  Mg     2.1     10-14    TPro  5.4[L]  /  Alb  3.7  /  TBili  0.6  /  DBili  <0.1  /  AST  11  /  ALT  14  /  AlkPhos  68  10-14    PT/INR - ( 14 Oct 2024 07:07 )   PT: 12.6 sec;   INR: 1.11 ratio         PTT - ( 14 Oct 2024 07:07 )  PTT:29.5 sec  LIVER FUNCTIONS - ( 14 Oct 2024 07:06 )  Alb: 3.7 g/dL / Pro: 5.4 g/dL / ALK PHOS: 68 U/L / ALT: 14 U/L / AST: 11 U/L / GGT: x           Lactate Dehydrogenase, Serum: 120 U/L (10-14 @ 07:06)      Meds:   Antimicrobials:   acyclovir   Oral Tab/Cap 800 milliGRAM(s) Oral every 12 hours  fluconAZOLE   Tablet 400 milliGRAM(s) Oral daily  levoFLOXacin  Tablet 500 milliGRAM(s) Oral every 24 hours  vancomycin    Solution 125 milliGRAM(s) Oral every 12 hours      Heme / Onc:       GI:  pantoprazole    Tablet 40 milliGRAM(s) Oral before breakfast  senna 2 Tablet(s) Oral at bedtime PRN  sodium bicarbonate Mouth Rinse 10 milliLiter(s) Swish and Spit five times a day      Cardiovascular:       Immunologic:   filgrastim-sndz (ZARXIO) Injectable 300 MICROGram(s) SubCutaneous daily      Other medications:   acetaminophen     Tablet .. 650 milliGRAM(s) Oral every 6 hours  benzocaine/menthol Lozenge 1 Lozenge Oral five times a day  Biotene Dry Mouth Oral Rinse 5 milliLiter(s) Swish and Spit five times a day  chlorhexidine 2% Cloths 1 Application(s) Topical <User Schedule>  FIRST- Mouthwash  BLM 5 milliLiter(s) Swish and Spit five times a day  phytonadione   Solution 5 milliGRAM(s) Oral <User Schedule>      PRN:   acetaminophen     Tablet .. 650 milliGRAM(s) Oral every 6 hours PRN  prochlorperazine   Injectable 10 milliGRAM(s) IV Push every 6 hours PRN  senna 2 Tablet(s) Oral at bedtime PRN  sodium chloride 0.9% lock flush 10 milliLiter(s) IV Push every 1 hour PRN    A/P: 58 year old female with recently diagnosed multiple myeloma (3/2024) treated with 5 cycles of Jolene-VRd, admitted for an autologous pbsct with melphalan (200mg/m2) prep regimen.  Today is Day +10    1. Infectious Disease:   acyclovir   Oral Tab/Cap 800 milliGRAM(s) Oral every 12 hours  fluconAZOLE   Tablet 400 milliGRAM(s) Oral daily  levoFLOXacin  Tablet 500 milliGRAM(s) Oral every 24 hours  vancomycin    Solution 125 milliGRAM(s) Oral every 12 hours    CMV PCR weekly post engraftment through day + 100.    2. GI Prophylaxis:    Protonix 40mg po QD     3. Mouthcare - NS / NaHCO3 rinses, Skin care     4. Transfuse & replete electrolytes prn     5. IV hydration, daily weights, strict I&O, prn diuresis     6. PO intake as tolerated, nutrition follow up as needed    7. Antiemetics, anti-diarrhea medications  prochlorperazine   Injectable 10 milliGRAM(s) IV Push every 6 hours PRN    8. OOB as tolerated, physical therapy consult if needed     9. Monitor coags 2x week, vitamin K 5mg po BIW (Mon and Thurs)    10. Monitor closely for clinical changes, monitor for fevers     11. Emotional support provided, plan of care discussed and questions addressed     12. Patient education done regarding plan of care, restrictions and discharge planning     13. Continue regular social work input     I have written the above note for Dr. Palacios who performed service with me in the room.   Aziza Booker NP-C ([phone])    I have seen and examined patient with NP, I agree with above note as scribed.                    HPC Transplant Team                                                      Critical / Counseling Time Provided: 30 minutes                                                                                                                                                        Chief Complaint: Autologous peripheral blood stem cell transplant with high dose melphalan (200mg/m2) prep regimen for treatment of multiple myeloma    Disease: MM  Type of Transplant: Autologous  Conditioning Prep: Melphalan (200mg/m2)  ABO/CMV: B pos/CMV-    S: Patient seen and examined with HPC Transplant Team:   Some throat discomfort, otherwise feels well     O: Vitals:   Vital Signs Last 24 Hrs  T(C): 36.9 (14 Oct 2024 05:21), Max: 37.6 (13 Oct 2024 18:58)  T(F): 98.4 (14 Oct 2024 05:21), Max: 99.6 (13 Oct 2024 18:58)  HR: 94 (14 Oct 2024 05:21) (94 - 107)  BP: 131/74 (14 Oct 2024 05:21) (109/72 - 131/74)  BP(mean): --  RR: 18 (14 Oct 2024 05:21) (18 - 18)  SpO2: 98% (14 Oct 2024 05:21) (97% - 99%)    Parameters below as of 14 Oct 2024 05:21  Patient On (Oxygen Delivery Method): room air      Admit weight: 58.9kg   Daily Weight in k.9 (14 Oct 2024 07:59)    Intake / Output:   10-13 @ 07:01  -  10-14 @ 07:00  --------------------------------------------------------  IN: 1953 mL / OUT: 2000 mL / NET: -47 mL      PE:   Oropharynx: No erythema or ulcerations   Oral Mucositis:                                                        stGstrstastdstest:st st1st CVS: +S1,S2, RRR   Lungs: CTA b/l  Abdomen: +BS x 3, soft, NT/ND  Extremities: no edema BLE's  Gastric Mucositis:                                                  stGstrstastdstest:st st1st Intestinal Mucositis:                                              stGstrstastdstest:st st1st Skin: no rash  TLC: CDI  Neuro: A&O x 3  Pain: 0    Labs:   CBC Full  -  ( 14 Oct 2024 07:07 )  WBC Count : 0.41 K/uL  Hemoglobin : 9.4 g/dL  Hematocrit : 27.3 %  Platelet Count - Automated : 15 K/uL  Mean Cell Volume : 93.5 fl  Mean Cell Hemoglobin : 32.2 pg  Mean Cell Hemoglobin Concentration : 34.4 gm/dL  Auto Neutrophil # : x  Auto Lymphocyte # : x  Auto Monocyte # : x  Auto Eosinophil # : x  Auto Basophil # : x  Auto Neutrophil % : x  Auto Lymphocyte % : x  Auto Monocyte % : x  Auto Eosinophil % : x  Auto Basophil % : x                          9.4    0.41  )-----------( 15       ( 14 Oct 2024 07:07 )             27.3     10-14    136  |  101  |  6[L]  ----------------------------<  92  3.7   |  25  |  0.78    Ca    8.6      14 Oct 2024 07:06  Phos  3.8     10-14  Mg     2.1     10-14    TPro  5.4[L]  /  Alb  3.7  /  TBili  0.6  /  DBili  <0.1  /  AST  11  /  ALT  14  /  AlkPhos  68  10-14    PT/INR - ( 14 Oct 2024 07:07 )   PT: 12.6 sec;   INR: 1.11 ratio         PTT - ( 14 Oct 2024 07:07 )  PTT:29.5 sec  LIVER FUNCTIONS - ( 14 Oct 2024 07:06 )  Alb: 3.7 g/dL / Pro: 5.4 g/dL / ALK PHOS: 68 U/L / ALT: 14 U/L / AST: 11 U/L / GGT: x           Lactate Dehydrogenase, Serum: 120 U/L (10-14 @ 07:06)      Meds:   Antimicrobials:   acyclovir   Oral Tab/Cap 800 milliGRAM(s) Oral every 12 hours  fluconAZOLE   Tablet 400 milliGRAM(s) Oral daily  levoFLOXacin  Tablet 500 milliGRAM(s) Oral every 24 hours  vancomycin    Solution 125 milliGRAM(s) Oral every 12 hours      Heme / Onc:       GI:  pantoprazole    Tablet 40 milliGRAM(s) Oral before breakfast  senna 2 Tablet(s) Oral at bedtime PRN  sodium bicarbonate Mouth Rinse 10 milliLiter(s) Swish and Spit five times a day      Cardiovascular:       Immunologic:   filgrastim-sndz (ZARXIO) Injectable 300 MICROGram(s) SubCutaneous daily      Other medications:   acetaminophen     Tablet .. 650 milliGRAM(s) Oral every 6 hours  benzocaine/menthol Lozenge 1 Lozenge Oral five times a day  Biotene Dry Mouth Oral Rinse 5 milliLiter(s) Swish and Spit five times a day  chlorhexidine 2% Cloths 1 Application(s) Topical <User Schedule>  FIRST- Mouthwash  BLM 5 milliLiter(s) Swish and Spit five times a day  phytonadione   Solution 5 milliGRAM(s) Oral <User Schedule>      PRN:   acetaminophen     Tablet .. 650 milliGRAM(s) Oral every 6 hours PRN  prochlorperazine   Injectable 10 milliGRAM(s) IV Push every 6 hours PRN  senna 2 Tablet(s) Oral at bedtime PRN  sodium chloride 0.9% lock flush 10 milliLiter(s) IV Push every 1 hour PRN    A/P: 58 year old female with recently diagnosed multiple myeloma (3/2024) treated with 5 cycles of Jolene-VRd, admitted for an autologous pbsct with melphalan (200mg/m2) prep regimen.  Today is Day +10  10/14- some throat discomfort, otherwise feels well. No acute events overnight. Will begin discharge planning.     1. Infectious Disease:   acyclovir   Oral Tab/Cap 800 milliGRAM(s) Oral every 12 hours  fluconAZOLE   Tablet 400 milliGRAM(s) Oral daily  levoFLOXacin  Tablet 500 milliGRAM(s) Oral every 24 hours  vancomycin    Solution 125 milliGRAM(s) Oral every 12 hours    CMV PCR weekly post engraftment through day + 100.    2. GI Prophylaxis:    Protonix 40mg po QD     3. Mouthcare - NS / NaHCO3 rinses, Skin care     4. Transfuse & replete electrolytes prn     5. IV hydration, daily weights, strict I&O, prn diuresis     6. PO intake as tolerated, nutrition follow up as needed    7. Antiemetics, anti-diarrhea medications  prochlorperazine   Injectable 10 milliGRAM(s) IV Push every 6 hours PRN    8. OOB as tolerated, physical therapy consult if needed     9. Monitor coags 2x week, vitamin K 5mg po BIW (Mon and Thurs)    10. Monitor closely for clinical changes, monitor for fevers     11. Emotional support provided, plan of care discussed and questions addressed     12. Patient education done regarding plan of care, restrictions and discharge planning     13. Continue regular social work input     I have written the above note for Dr. Palacios who performed service with me in the room.   Aziza Booker NP-C ([phone])    I have seen and examined patient with NP, I agree with above note as scribed.

## 2024-10-15 DIAGNOSIS — C90.00 MULTIPLE MYELOMA NOT HAVING ACHIEVED REMISSION: ICD-10-CM

## 2024-10-15 LAB
ALBUMIN SERPL ELPH-MCNC: 3.7 G/DL — SIGNIFICANT CHANGE UP (ref 3.3–5)
ALP SERPL-CCNC: 66 U/L — SIGNIFICANT CHANGE UP (ref 40–120)
ALT FLD-CCNC: 14 U/L — SIGNIFICANT CHANGE UP (ref 10–45)
ANION GAP SERPL CALC-SCNC: 11 MMOL/L — SIGNIFICANT CHANGE UP (ref 5–17)
AST SERPL-CCNC: 9 U/L — LOW (ref 10–40)
BASOPHILS # BLD AUTO: 0 K/UL — SIGNIFICANT CHANGE UP (ref 0–0.2)
BASOPHILS NFR BLD AUTO: 0 % — SIGNIFICANT CHANGE UP (ref 0–2)
BILIRUB SERPL-MCNC: 0.4 MG/DL — SIGNIFICANT CHANGE UP (ref 0.2–1.2)
BUN SERPL-MCNC: 7 MG/DL — SIGNIFICANT CHANGE UP (ref 7–23)
CALCIUM SERPL-MCNC: 8.7 MG/DL — SIGNIFICANT CHANGE UP (ref 8.4–10.5)
CHLORIDE SERPL-SCNC: 103 MMOL/L — SIGNIFICANT CHANGE UP (ref 96–108)
CO2 SERPL-SCNC: 25 MMOL/L — SIGNIFICANT CHANGE UP (ref 22–31)
CREAT SERPL-MCNC: 0.8 MG/DL — SIGNIFICANT CHANGE UP (ref 0.5–1.3)
EGFR: 84 ML/MIN/1.73M2 — SIGNIFICANT CHANGE UP
EOSINOPHIL # BLD AUTO: 0 K/UL — SIGNIFICANT CHANGE UP (ref 0–0.5)
EOSINOPHIL NFR BLD AUTO: 0 % — SIGNIFICANT CHANGE UP (ref 0–6)
GLUCOSE SERPL-MCNC: 93 MG/DL — SIGNIFICANT CHANGE UP (ref 70–99)
HCT VFR BLD CALC: 27.4 % — LOW (ref 34.5–45)
HGB BLD-MCNC: 9.5 G/DL — LOW (ref 11.5–15.5)
LDH SERPL L TO P-CCNC: 124 U/L — SIGNIFICANT CHANGE UP (ref 50–242)
LYMPHOCYTES # BLD AUTO: 0.5 K/UL — LOW (ref 1–3.3)
LYMPHOCYTES # BLD AUTO: 53.5 % — HIGH (ref 13–44)
MAGNESIUM SERPL-MCNC: 2 MG/DL — SIGNIFICANT CHANGE UP (ref 1.6–2.6)
MANUAL SMEAR VERIFICATION: SIGNIFICANT CHANGE UP
MCHC RBC-ENTMCNC: 32.4 PG — SIGNIFICANT CHANGE UP (ref 27–34)
MCHC RBC-ENTMCNC: 34.7 GM/DL — SIGNIFICANT CHANGE UP (ref 32–36)
MCV RBC AUTO: 93.5 FL — SIGNIFICANT CHANGE UP (ref 80–100)
MONOCYTES # BLD AUTO: 0.11 K/UL — SIGNIFICANT CHANGE UP (ref 0–0.9)
MONOCYTES NFR BLD AUTO: 11.6 % — SIGNIFICANT CHANGE UP (ref 2–14)
MYELOCYTES NFR BLD: 2.3 % — HIGH (ref 0–0)
NEUTROPHILS # BLD AUTO: 0.31 K/UL — LOW (ref 1.8–7.4)
NEUTROPHILS NFR BLD AUTO: 32.6 % — LOW (ref 43–77)
PHOSPHATE SERPL-MCNC: 4 MG/DL — SIGNIFICANT CHANGE UP (ref 2.5–4.5)
PLAT MORPH BLD: NORMAL — SIGNIFICANT CHANGE UP
PLATELET # BLD AUTO: 15 K/UL — CRITICAL LOW (ref 150–400)
POTASSIUM SERPL-MCNC: 3.7 MMOL/L — SIGNIFICANT CHANGE UP (ref 3.5–5.3)
POTASSIUM SERPL-SCNC: 3.7 MMOL/L — SIGNIFICANT CHANGE UP (ref 3.5–5.3)
PROT SERPL-MCNC: 5.5 G/DL — LOW (ref 6–8.3)
RBC # BLD: 2.93 M/UL — LOW (ref 3.8–5.2)
RBC # FLD: 13.2 % — SIGNIFICANT CHANGE UP (ref 10.3–14.5)
RBC BLD AUTO: SIGNIFICANT CHANGE UP
SODIUM SERPL-SCNC: 139 MMOL/L — SIGNIFICANT CHANGE UP (ref 135–145)
WBC # BLD: 0.94 K/UL — CRITICAL LOW (ref 3.8–10.5)
WBC # FLD AUTO: 0.94 K/UL — CRITICAL LOW (ref 3.8–10.5)

## 2024-10-15 PROCEDURE — 99233 SBSQ HOSP IP/OBS HIGH 50: CPT

## 2024-10-15 RX ADMIN — Medication 5 MILLILITER(S): at 07:27

## 2024-10-15 RX ADMIN — VANCOMYCIN HCL-SODIUM CHLORIDE IV SOLN 1.5 GM/250ML-0.9% 125 MILLIGRAM(S): 1.5-0.9/25 SOLUTION at 05:03

## 2024-10-15 RX ADMIN — Medication 5 MILLILITER(S): at 17:11

## 2024-10-15 RX ADMIN — Medication 400 MILLIGRAM(S): at 11:33

## 2024-10-15 RX ADMIN — PANTOPRAZOLE SODIUM 40 MILLIGRAM(S): 40 TABLET, DELAYED RELEASE ORAL at 05:04

## 2024-10-15 RX ADMIN — Medication 10 MILLILITER(S): at 00:25

## 2024-10-15 RX ADMIN — Medication 5 MILLILITER(S): at 23:10

## 2024-10-15 RX ADMIN — VANCOMYCIN HCL-SODIUM CHLORIDE IV SOLN 1.5 GM/250ML-0.9% 125 MILLIGRAM(S): 1.5-0.9/25 SOLUTION at 17:09

## 2024-10-15 RX ADMIN — CHLORHEXIDINE GLUCONATE ORAL RINSE 1 APPLICATION(S): 1.2 SOLUTION DENTAL at 11:34

## 2024-10-15 RX ADMIN — Medication 5 MILLILITER(S): at 20:24

## 2024-10-15 RX ADMIN — Medication 10 MILLILITER(S): at 23:10

## 2024-10-15 RX ADMIN — DIPHENHYDRAMINE HYDROCHLORIDE AND LIDOCAINE HYDROCHLORIDE AND ALUMINUM HYDROXIDE AND MAGNESIUM HYDRO 5 MILLILITER(S): KIT at 07:28

## 2024-10-15 RX ADMIN — Medication 10 MILLILITER(S): at 17:11

## 2024-10-15 RX ADMIN — FILGRASTIM 300 MICROGRAM(S): 300 INJECTION, SOLUTION INTRAVENOUS at 11:33

## 2024-10-15 RX ADMIN — Medication 10 MILLILITER(S): at 20:24

## 2024-10-15 RX ADMIN — Medication 5 MILLILITER(S): at 00:25

## 2024-10-15 RX ADMIN — Medication 800 MILLIGRAM(S): at 17:09

## 2024-10-15 RX ADMIN — Medication 800 MILLIGRAM(S): at 05:04

## 2024-10-15 RX ADMIN — Medication 10 MILLILITER(S): at 07:26

## 2024-10-15 RX ADMIN — Medication 10 MILLILITER(S): at 11:33

## 2024-10-15 RX ADMIN — Medication 5 MILLILITER(S): at 11:34

## 2024-10-15 NOTE — PROGRESS NOTE ADULT - SUBJECTIVE AND OBJECTIVE BOX
[facility]PC Transplant Team                                                      Critical / Counseling Time Provided: 30 minutes                                                                                                                                                        Chief Complaint: Autologous peripheral blood stem cell transplant with high dose melphalan (200mg/m2) prep regimen for treatment of multiple myeloma    Disease: MM  Type of Transplant: Autologous  Conditioning Prep: Melphalan (200mg/m2)  ABO/CMV: B pos/CMV-    S: Patient seen and examined with HPC Transplant Team:       O: Vitals:   Vital Signs Last 24 Hrs  T(C): 37.1 (15 Oct 2024 05:24), Max: 37.7 (14 Oct 2024 18:18)  T(F): 98.8 (15 Oct 2024 05:24), Max: 99.8 (14 Oct 2024 18:18)  HR: 100 (15 Oct 2024 05:24) (95 - 100)  BP: 123/70 (15 Oct 2024 05:24) (108/69 - 123/70)  BP(mean): --  RR: 17 (15 Oct 2024 05:24) (17 - 18)  SpO2: 99% (15 Oct 2024 05:) (99% - 100%)    Parameters below as of 15 Oct 2024 05:24  Patient On (Oxygen Delivery Method): room air      Admit weight: 58.9kg   Daily Weight in k.7 (15 Oct 2024 07:33)    Intake / Output:   10-14 @ 07:01  -  10-15 @ 07:00  --------------------------------------------------------  IN: 953 mL / OUT: 1980 mL / NET: -1027 mL        PE:   Oropharynx: No erythema or ulcerations   Oral Mucositis:                                                        stGstrstastdstest:st st1st CVS: +S1,S2, RRR   Lungs: CTA b/l  Abdomen: +BS x 3, soft, NT/ND  Extremities: no edema BLE's  Gastric Mucositis:                                                  stGstrstastdstest:st st1st Intestinal Mucositis:                                              stGstrstastdstest:st st1st Skin: no rash  TLC: CDI  Neuro: A&O x 3  Pain: 0      Labs:   CBC Full  -  ( 15 Oct 2024 07:25 )  WBC Count : 0.94 K/uL  Hemoglobin : 9.5 g/dL  Hematocrit : 27.4 %  Platelet Count - Automated : 15 K/uL  Mean Cell Volume : 93.5 fl  Mean Cell Hemoglobin : 32.4 pg  Mean Cell Hemoglobin Concentration : 34.7 gm/dL  Auto Neutrophil # : x  Auto Lymphocyte # : x  Auto Monocyte # : x  Auto Eosinophil # : x  Auto Basophil # : x  Auto Neutrophil % : x  Auto Lymphocyte % : x  Auto Monocyte % : x  Auto Eosinophil % : x  Auto Basophil % : x                          9.5    0.94  )-----------( 15       ( 15 Oct 2024 07:25 )             27.4     10-15    139  |  103  |  7   ----------------------------<  93  3.7   |  25  |  0.80    Ca    8.7      15 Oct 2024 07:25  Phos  4.0     10-15  Mg     2.0     10-15    TPro  5.5[L]  /  Alb  3.7  /  TBili  0.4  /  DBili  x   /  AST  9[L]  /  ALT  14  /  AlkPhos  66  10-15    PT/INR - ( 14 Oct 2024 07:07 )   PT: 12.6 sec;   INR: 1.11 ratio         PTT - ( 14 Oct 2024 07:07 )  PTT:29.5 sec  LIVER FUNCTIONS - ( 15 Oct 2024 07:25 )  Alb: 3.7 g/dL / Pro: 5.5 g/dL / ALK PHOS: 66 U/L / ALT: 14 U/L / AST: 9 U/L / GGT: x           Lactate Dehydrogenase, Serum: 124 U/L (10-15 @ 07:25)    Cultures:   Culture Results:   No growth at 5 days (10.03.24 @ 14:59)    Culture Results:   No growth at 5 days (10.03.24 @ 10:45)      Meds:   Antimicrobials:   acyclovir   Oral Tab/Cap 800 milliGRAM(s) Oral every 12 hours  fluconAZOLE   Tablet 400 milliGRAM(s) Oral daily  levoFLOXacin  Tablet 500 milliGRAM(s) Oral every 24 hours  vancomycin    Solution 125 milliGRAM(s) Oral every 12 hours      Heme / Onc:       GI:  pantoprazole    Tablet 40 milliGRAM(s) Oral before breakfast  senna 2 Tablet(s) Oral at bedtime PRN  sodium bicarbonate Mouth Rinse 10 milliLiter(s) Swish and Spit five times a day      Cardiovascular:       Immunologic:   filgrastim-sndz (ZARXIO) Injectable 300 MICROGram(s) SubCutaneous daily      Other medications:   acetaminophen     Tablet .. 650 milliGRAM(s) Oral every 6 hours  Biotene Dry Mouth Oral Rinse 5 milliLiter(s) Swish and Spit five times a day  chlorhexidine 2% Cloths 1 Application(s) Topical <User Schedule>  FIRST- Mouthwash  BLM 5 milliLiter(s) Swish and Spit five times a day  phytonadione   Solution 5 milliGRAM(s) Oral <User Schedule>      PRN:   acetaminophen     Tablet .. 650 milliGRAM(s) Oral every 6 hours PRN  prochlorperazine   Injectable 10 milliGRAM(s) IV Push every 6 hours PRN  senna 2 Tablet(s) Oral at bedtime PRN  sodium chloride 0.9% lock flush 10 milliLiter(s) IV Push every 1 hour PRN    A/P: 58 year old female with recently diagnosed multiple myeloma (3/2024) treated with 5 cycles of Jolene-VRd, admitted for an autologous pbsct with melphalan (200mg/m2) prep regimen.  Today is Day +11  10/14- some throat discomfort, otherwise feels well. No acute events overnight. Will begin discharge planning.   10/15- no acute events overnight.     1. Infectious Disease:   acyclovir   Oral Tab/Cap 800 milliGRAM(s) Oral every 12 hours  fluconAZOLE   Tablet 400 milliGRAM(s) Oral daily  levoFLOXacin  Tablet 500 milliGRAM(s) Oral every 24 hours  vancomycin    Solution 125 milliGRAM(s) Oral every 12 hours    CMV PCR weekly post engraftment through day + 100.    2. GI Prophylaxis:    Protonix 40mg po QD     3. Mouthcare - NS / NaHCO3 rinses, Skin care     4. Transfuse & replete electrolytes prn     5. IV hydration, daily weights, strict I&O, prn diuresis     6. PO intake as tolerated, nutrition follow up as needed    7. Antiemetics, anti-diarrhea medications  prochlorperazine   Injectable 10 milliGRAM(s) IV Push every 6 hours PRN    8. OOB as tolerated, physical therapy consult if needed     9. Monitor coags 2x week, vitamin K 5mg po BIW (Mon and Thurs)    10. Monitor closely for clinical changes, monitor for fevers     11. Emotional support provided, plan of care discussed and questions addressed     12. Patient education done regarding plan of care, restrictions and discharge planning     13. Continue regular social work input     I have written the above note for Dr. Palacios who performed service with me in the room.   Aziza Booker NP-C ([phone])    I have seen and examined patient with NP, I agree with above note as scribed.                      [facility] Transplant Team                                                      Critical / Counseling Time Provided: 30 minutes                                                                                                                                                        Chief Complaint: Autologous peripheral blood stem cell transplant with high dose melphalan (200mg/m2) prep regimen for treatment of multiple myeloma    Disease: MM  Type of Transplant: Autologous  Conditioning Prep: Melphalan (200mg/m2)  ABO/CMV: B pos/CMV-    S: Patient seen and examined with HPC Transplant Team:   Has no complaints today       O: Vitals:   Vital Signs Last 24 Hrs  T(C): 37.1 (15 Oct 2024 05:24), Max: 37.7 (14 Oct 2024 18:18)  T(F): 98.8 (15 Oct 2024 05:24), Max: 99.8 (14 Oct 2024 18:18)  HR: 100 (15 Oct 2024 05:) (95 - 100)  BP: 123/70 (15 Oct 2024 05:24) (108/69 - 123/70)  BP(mean): --  RR: 17 (15 Oct 2024 05:) (17 - 18)  SpO2: 99% (15 Oct 2024 05:) (99% - 100%)    Parameters below as of 15 Oct 2024 05:24  Patient On (Oxygen Delivery Method): room air      Admit weight: 58.9kg   Daily Weight in k.7 (15 Oct 2024 07:33)    Intake / Output:   10-14 @ 07:01  -  10-15 @ 07:00  --------------------------------------------------------  IN: 953 mL / OUT: 1980 mL / NET: -1027 mL        PE:   Oropharynx: No erythema or ulcerations   Oral Mucositis:                                                        stGstrstastdstest:st st1st CVS: +S1,S2, RRR   Lungs: CTA b/l  Abdomen: +BS x 3, soft, NT/ND  Extremities: no edema BLE's  Gastric Mucositis:                                                  stGstrstastdstest:st st1st Intestinal Mucositis:                                              stGstrstastdstest:st st1st Skin: no rash  TLC: CDI  Neuro: A&O x 3  Pain: 0      Labs:   CBC Full  -  ( 15 Oct 2024 07:25 )  WBC Count : 0.94 K/uL  Hemoglobin : 9.5 g/dL  Hematocrit : 27.4 %  Platelet Count - Automated : 15 K/uL  Mean Cell Volume : 93.5 fl  Mean Cell Hemoglobin : 32.4 pg  Mean Cell Hemoglobin Concentration : 34.7 gm/dL  Auto Neutrophil # : x  Auto Lymphocyte # : x  Auto Monocyte # : x  Auto Eosinophil # : x  Auto Basophil # : x  Auto Neutrophil % : x  Auto Lymphocyte % : x  Auto Monocyte % : x  Auto Eosinophil % : x  Auto Basophil % : x                          9.5    0.94  )-----------( 15       ( 15 Oct 2024 07:25 )             27.4     10-15    139  |  103  |  7   ----------------------------<  93  3.7   |  25  |  0.80    Ca    8.7      15 Oct 2024 07:25  Phos  4.0     10-15  Mg     2.0     10-15    TPro  5.5[L]  /  Alb  3.7  /  TBili  0.4  /  DBili  x   /  AST  9[L]  /  ALT  14  /  AlkPhos  66  10-15    PT/INR - ( 14 Oct 2024 07:07 )   PT: 12.6 sec;   INR: 1.11 ratio         PTT - ( 14 Oct 2024 07:07 )  PTT:29.5 sec  LIVER FUNCTIONS - ( 15 Oct 2024 07:25 )  Alb: 3.7 g/dL / Pro: 5.5 g/dL / ALK PHOS: 66 U/L / ALT: 14 U/L / AST: 9 U/L / GGT: x           Lactate Dehydrogenase, Serum: 124 U/L (10-15 @ 07:25)    Cultures:   Culture Results:   No growth at 5 days (10.03.24 @ 14:59)    Culture Results:   No growth at 5 days (10.03.24 @ 10:45)      Meds:   Antimicrobials:   acyclovir   Oral Tab/Cap 800 milliGRAM(s) Oral every 12 hours  fluconAZOLE   Tablet 400 milliGRAM(s) Oral daily  levoFLOXacin  Tablet 500 milliGRAM(s) Oral every 24 hours  vancomycin    Solution 125 milliGRAM(s) Oral every 12 hours      Heme / Onc:       GI:  pantoprazole    Tablet 40 milliGRAM(s) Oral before breakfast  senna 2 Tablet(s) Oral at bedtime PRN  sodium bicarbonate Mouth Rinse 10 milliLiter(s) Swish and Spit five times a day      Cardiovascular:       Immunologic:   filgrastim-sndz (ZARXIO) Injectable 300 MICROGram(s) SubCutaneous daily      Other medications:   acetaminophen     Tablet .. 650 milliGRAM(s) Oral every 6 hours  Biotene Dry Mouth Oral Rinse 5 milliLiter(s) Swish and Spit five times a day  chlorhexidine 2% Cloths 1 Application(s) Topical <User Schedule>  FIRST- Mouthwash  BLM 5 milliLiter(s) Swish and Spit five times a day  phytonadione   Solution 5 milliGRAM(s) Oral <User Schedule>      PRN:   acetaminophen     Tablet .. 650 milliGRAM(s) Oral every 6 hours PRN  prochlorperazine   Injectable 10 milliGRAM(s) IV Push every 6 hours PRN  senna 2 Tablet(s) Oral at bedtime PRN  sodium chloride 0.9% lock flush 10 milliLiter(s) IV Push every 1 hour PRN    A/P: 58 year old female with recently diagnosed multiple myeloma (3/2024) treated with 5 cycles of Jolene-VRd, admitted for an autologous pbsct with melphalan (200mg/m2) prep regimen.  Today is Day +11  10/14- some throat discomfort, otherwise feels well. No acute events overnight. Will begin discharge planning.   10/15- no acute events overnight. Discharge planning for tomorrow. Check CMV PCR     1. Infectious Disease:   acyclovir   Oral Tab/Cap 800 milliGRAM(s) Oral every 12 hours  fluconAZOLE   Tablet 400 milliGRAM(s) Oral daily  levoFLOXacin  Tablet 500 milliGRAM(s) Oral every 24 hours  vancomycin    Solution 125 milliGRAM(s) Oral every 12 hours    CMV PCR weekly post engraftment through day + 100.    2. GI Prophylaxis:    Protonix 40mg po QD     3. Mouthcare - NS / NaHCO3 rinses, Skin care     4. Transfuse & replete electrolytes prn     5. IV hydration, daily weights, strict I&O, prn diuresis     6. PO intake as tolerated, nutrition follow up as needed    7. Antiemetics, anti-diarrhea medications  prochlorperazine   Injectable 10 milliGRAM(s) IV Push every 6 hours PRN    8. OOB as tolerated, physical therapy consult if needed     9. Monitor coags 2x week, vitamin K 5mg po BIW (Mon and Thurs)    10. Monitor closely for clinical changes, monitor for fevers     11. Emotional support provided, plan of care discussed and questions addressed     12. Patient education done regarding plan of care, restrictions and discharge planning     13. Continue regular social work input     I have written the above note for Dr. Palacios who performed service with me in the room.   Aziza Booker NP-C ([phone])    I have seen and examined patient with NP, I agree with above note as scribed.

## 2024-10-15 NOTE — PROGRESS NOTE ADULT - NS ATTEND AMEND GEN_ALL_CORE FT
58 year old female admitted for auto-HSCT for  multiple myeloma with melphalan (200mg/m2) prep regimen. Day +10     Overall, patient is doing well with no specific complaints.   wbc with small uptrend, continue to monitor on filgrastim; start planning for discharge in coming days.   continue supportive care and anti-infectious prophylaxis   All questions were answered.     Patient was seen in IDR with nurses, ACP and Pharmacist.   I personally examined the patient; data reviewed. I addressed patient's questions. 58 year old female admitted for auto-HSCT for  multiple myeloma with melphalan (200mg/m2) prep regimen. Day +11    Overall, patient is doing well with no specific complaints.   wbc 0.98; continue to monitor on filgrastim; likely discharge tomorrow    continue supportive care and anti-infectious prophylaxis   All questions were answered.     Patient was seen in IDR with nurses, ACP and Pharmacist.   I personally examined the patient; data reviewed. I addressed patient's questions.

## 2024-10-15 NOTE — PROVIDER CONTACT NOTE (CRITICAL VALUE NOTIFICATION) - ASSESSMENT
Pt is A&O4, VSS, afebrile. no overt s/s of bleeding noted
pt. a & o x 4, VSS, no s/s of active bleeding

## 2024-10-16 ENCOUNTER — TRANSCRIPTION ENCOUNTER (OUTPATIENT)
Age: 59
End: 2024-10-16

## 2024-10-16 VITALS
OXYGEN SATURATION: 100 % | SYSTOLIC BLOOD PRESSURE: 111 MMHG | DIASTOLIC BLOOD PRESSURE: 72 MMHG | RESPIRATION RATE: 18 BRPM | TEMPERATURE: 99 F | HEART RATE: 102 BPM

## 2024-10-16 LAB
ALBUMIN SERPL ELPH-MCNC: 3.7 G/DL — SIGNIFICANT CHANGE UP (ref 3.3–5)
ALP SERPL-CCNC: 74 U/L — SIGNIFICANT CHANGE UP (ref 40–120)
ALT FLD-CCNC: 16 U/L — SIGNIFICANT CHANGE UP (ref 10–45)
ANION GAP SERPL CALC-SCNC: 14 MMOL/L — SIGNIFICANT CHANGE UP (ref 5–17)
AST SERPL-CCNC: 14 U/L — SIGNIFICANT CHANGE UP (ref 10–40)
BASOPHILS # BLD AUTO: 0 K/UL — SIGNIFICANT CHANGE UP (ref 0–0.2)
BASOPHILS NFR BLD AUTO: 0 % — SIGNIFICANT CHANGE UP (ref 0–2)
BILIRUB DIRECT SERPL-MCNC: <0.1 MG/DL — SIGNIFICANT CHANGE UP (ref 0–0.3)
BILIRUB INDIRECT FLD-MCNC: >0.3 MG/DL — SIGNIFICANT CHANGE UP (ref 0.2–1)
BILIRUB SERPL-MCNC: 0.4 MG/DL — SIGNIFICANT CHANGE UP (ref 0.2–1.2)
BLD GP AB SCN SERPL QL: POSITIVE — SIGNIFICANT CHANGE UP
BUN SERPL-MCNC: 6 MG/DL — LOW (ref 7–23)
CALCIUM SERPL-MCNC: 8.7 MG/DL — SIGNIFICANT CHANGE UP (ref 8.4–10.5)
CHLORIDE SERPL-SCNC: 99 MMOL/L — SIGNIFICANT CHANGE UP (ref 96–108)
CO2 SERPL-SCNC: 25 MMOL/L — SIGNIFICANT CHANGE UP (ref 22–31)
CREAT SERPL-MCNC: 0.85 MG/DL — SIGNIFICANT CHANGE UP (ref 0.5–1.3)
EGFR: 78 ML/MIN/1.73M2 — SIGNIFICANT CHANGE UP
EOSINOPHIL # BLD AUTO: 0 K/UL — SIGNIFICANT CHANGE UP (ref 0–0.5)
EOSINOPHIL NFR BLD AUTO: 0 % — SIGNIFICANT CHANGE UP (ref 0–6)
GLUCOSE SERPL-MCNC: 91 MG/DL — SIGNIFICANT CHANGE UP (ref 70–99)
HCT VFR BLD CALC: 27.3 % — LOW (ref 34.5–45)
HGB BLD-MCNC: 9.4 G/DL — LOW (ref 11.5–15.5)
LDH SERPL L TO P-CCNC: 182 U/L — SIGNIFICANT CHANGE UP (ref 50–242)
LYMPHOCYTES # BLD AUTO: 0.97 K/UL — LOW (ref 1–3.3)
LYMPHOCYTES # BLD AUTO: 17.5 % — SIGNIFICANT CHANGE UP (ref 13–44)
MAGNESIUM SERPL-MCNC: 1.9 MG/DL — SIGNIFICANT CHANGE UP (ref 1.6–2.6)
MANUAL SMEAR VERIFICATION: SIGNIFICANT CHANGE UP
MCHC RBC-ENTMCNC: 32.2 PG — SIGNIFICANT CHANGE UP (ref 27–34)
MCHC RBC-ENTMCNC: 34.4 GM/DL — SIGNIFICANT CHANGE UP (ref 32–36)
MCV RBC AUTO: 93.5 FL — SIGNIFICANT CHANGE UP (ref 80–100)
METAMYELOCYTES # FLD: 4.6 % — HIGH (ref 0–0)
MONOCYTES # BLD AUTO: 0.36 K/UL — SIGNIFICANT CHANGE UP (ref 0–0.9)
MONOCYTES NFR BLD AUTO: 6.4 % — SIGNIFICANT CHANGE UP (ref 2–14)
MYELOCYTES NFR BLD: 0.9 % — HIGH (ref 0–0)
NEUTROPHILS # BLD AUTO: 3.93 K/UL — SIGNIFICANT CHANGE UP (ref 1.8–7.4)
NEUTROPHILS NFR BLD AUTO: 58.7 % — SIGNIFICANT CHANGE UP (ref 43–77)
NEUTS BAND # BLD: 11.9 % — HIGH (ref 0–8)
PHOSPHATE SERPL-MCNC: 4.1 MG/DL — SIGNIFICANT CHANGE UP (ref 2.5–4.5)
PLAT MORPH BLD: NORMAL — SIGNIFICANT CHANGE UP
PLATELET # BLD AUTO: 29 K/UL — LOW (ref 150–400)
POTASSIUM SERPL-MCNC: 3.4 MMOL/L — LOW (ref 3.5–5.3)
POTASSIUM SERPL-SCNC: 3.4 MMOL/L — LOW (ref 3.5–5.3)
PROT SERPL-MCNC: 5.6 G/DL — LOW (ref 6–8.3)
RBC # BLD: 2.92 M/UL — LOW (ref 3.8–5.2)
RBC # FLD: 13.4 % — SIGNIFICANT CHANGE UP (ref 10.3–14.5)
RBC BLD AUTO: SIGNIFICANT CHANGE UP
RH IG SCN BLD-IMP: POSITIVE — SIGNIFICANT CHANGE UP
SODIUM SERPL-SCNC: 138 MMOL/L — SIGNIFICANT CHANGE UP (ref 135–145)
WBC # BLD: 5.56 K/UL — SIGNIFICANT CHANGE UP (ref 3.8–10.5)
WBC # FLD AUTO: 5.56 K/UL — SIGNIFICANT CHANGE UP (ref 3.8–10.5)

## 2024-10-16 PROCEDURE — 87641 MR-STAPH DNA AMP PROBE: CPT

## 2024-10-16 PROCEDURE — 85045 AUTOMATED RETICULOCYTE COUNT: CPT

## 2024-10-16 PROCEDURE — 80053 COMPREHEN METABOLIC PANEL: CPT

## 2024-10-16 PROCEDURE — 82955 ASSAY OF G6PD ENZYME: CPT

## 2024-10-16 PROCEDURE — 86850 RBC ANTIBODY SCREEN: CPT

## 2024-10-16 PROCEDURE — 87040 BLOOD CULTURE FOR BACTERIA: CPT

## 2024-10-16 PROCEDURE — 86901 BLOOD TYPING SEROLOGIC RH(D): CPT

## 2024-10-16 PROCEDURE — 82248 BILIRUBIN DIRECT: CPT

## 2024-10-16 PROCEDURE — 84100 ASSAY OF PHOSPHORUS: CPT

## 2024-10-16 PROCEDURE — 85730 THROMBOPLASTIN TIME PARTIAL: CPT

## 2024-10-16 PROCEDURE — 83615 LACTATE (LD) (LDH) ENZYME: CPT

## 2024-10-16 PROCEDURE — 93005 ELECTROCARDIOGRAM TRACING: CPT

## 2024-10-16 PROCEDURE — 86922 COMPATIBILITY TEST ANTIGLOB: CPT

## 2024-10-16 PROCEDURE — 36589 REMOVAL TUNNELED CV CATH: CPT

## 2024-10-16 PROCEDURE — 38209 WASH HARVEST STEM CELLS: CPT

## 2024-10-16 PROCEDURE — 86970 RBC PRETX INCUBATJ W/CHEMICL: CPT

## 2024-10-16 PROCEDURE — 83735 ASSAY OF MAGNESIUM: CPT

## 2024-10-16 PROCEDURE — 87640 STAPH A DNA AMP PROBE: CPT

## 2024-10-16 PROCEDURE — 86900 BLOOD TYPING SEROLOGIC ABO: CPT

## 2024-10-16 PROCEDURE — 38207 CRYOPRESERVE STEM CELLS: CPT

## 2024-10-16 PROCEDURE — 99233 SBSQ HOSP IP/OBS HIGH 50: CPT

## 2024-10-16 PROCEDURE — 81003 URINALYSIS AUTO W/O SCOPE: CPT

## 2024-10-16 PROCEDURE — 85610 PROTHROMBIN TIME: CPT

## 2024-10-16 PROCEDURE — 36415 COLL VENOUS BLD VENIPUNCTURE: CPT

## 2024-10-16 PROCEDURE — 85025 COMPLETE CBC W/AUTO DIFF WBC: CPT

## 2024-10-16 RX ADMIN — Medication 800 MILLIGRAM(S): at 17:12

## 2024-10-16 RX ADMIN — FILGRASTIM 300 MICROGRAM(S): 300 INJECTION, SOLUTION INTRAVENOUS at 11:12

## 2024-10-16 RX ADMIN — VANCOMYCIN HCL-SODIUM CHLORIDE IV SOLN 1.5 GM/250ML-0.9% 125 MILLIGRAM(S): 1.5-0.9/25 SOLUTION at 05:40

## 2024-10-16 RX ADMIN — Medication 10 MILLILITER(S): at 16:19

## 2024-10-16 RX ADMIN — Medication 10 MILLILITER(S): at 11:12

## 2024-10-16 RX ADMIN — Medication 5 MILLILITER(S): at 08:13

## 2024-10-16 RX ADMIN — Medication 800 MILLIGRAM(S): at 05:40

## 2024-10-16 RX ADMIN — Medication 40 MILLIEQUIVALENT(S): at 09:52

## 2024-10-16 RX ADMIN — Medication 5 MILLILITER(S): at 16:19

## 2024-10-16 RX ADMIN — PANTOPRAZOLE SODIUM 40 MILLIGRAM(S): 40 TABLET, DELAYED RELEASE ORAL at 05:40

## 2024-10-16 RX ADMIN — CHLORHEXIDINE GLUCONATE ORAL RINSE 1 APPLICATION(S): 1.2 SOLUTION DENTAL at 08:14

## 2024-10-16 RX ADMIN — Medication 10 MILLILITER(S): at 08:13

## 2024-10-16 RX ADMIN — Medication 5 MILLILITER(S): at 11:12

## 2024-10-16 NOTE — CHART NOTE - NSCHARTNOTEFT_GEN_A_CORE
NUTRITION FOLLOW UP NOTE    PATIENT SEEN FOR: follow-up for BMT admission and discharge education     SOURCE: [x] Patient  [x] Current Medical Record  [x] RN  [] Family/support person at bedside  [] Patient unavailable/inappropriate  [] Other:    CHART REVIEWED/EVENTS NOTED.  [] No changes to nutrition care plan to note  [x] Nutrition Status:  -- multiple myeloma admitted for an autologous pbsct with melphalan (200mg/m2) prep regimen, Day +12  -- for discharge today    DIET ORDER:   Diet, Regular (10-03-24)    CURRENT DIET ORDER IS:  [x] Appropriate:  [] Inadequate:  [] Other:    NUTRITION INTAKE/PROVISION:  [x] PO: pt reports PO intake remains good >75% most time, order foods as needed.   [] Enteral Nutrition:  [] Parenteral Nutrition:    ANTHROPOMETRICS:  Drug Dosing Weight  Height (cm): 156 (03 Oct 2024 07:37)  Weight (kg): 58.9 (03 Oct 2024 07:37)  BMI (kg/m2): 24.2 (03 Oct 2024 07:37)  Weights:   Daily Weight in k.8 (10-16), Weight in k.7 (10-15), Weight in k.9 (10-14), Weight in k.7 (10-13), Weight in k.7 (10-12), Weight in k.9 (10-11), Weight in k (10-10)   -- some weight fluctuation might due to fluid shift with treatment. Will continue to monitor weight trends as available/able.     MEDICATIONS:  MEDICATIONS  (STANDING):  acetaminophen     Tablet .. 650 milliGRAM(s) Oral every 6 hours  acyclovir   Oral Tab/Cap 800 milliGRAM(s) Oral every 12 hours  Biotene Dry Mouth Oral Rinse 5 milliLiter(s) Swish and Spit five times a day  chlorhexidine 2% Cloths 1 Application(s) Topical <User Schedule>  filgrastim-sndz (ZARXIO) Injectable 300 MICROGram(s) SubCutaneous daily  pantoprazole    Tablet 40 milliGRAM(s) Oral before breakfast  phytonadione   Solution 5 milliGRAM(s) Oral <User Schedule>  sodium bicarbonate Mouth Rinse 10 milliLiter(s) Swish and Spit five times a day    MEDICATIONS  (PRN):  acetaminophen     Tablet .. 650 milliGRAM(s) Oral every 6 hours PRN Temp greater or equal to 38C (100.4F), Mild Pain (1 - 3)  prochlorperazine   Injectable 10 milliGRAM(s) IV Push every 6 hours PRN nausea/vomiting  senna 2 Tablet(s) Oral at bedtime PRN Constipation  sodium chloride 0.9% lock flush 10 milliLiter(s) IV Push every 1 hour PRN Pre/post blood products, medications, blood draw, and to maintain line patency      NUTRITIONALLY PERTINENT LABS:  10-16 Na138 mmol/L Glu 91 mg/dL K+ 3.4 mmol/L[L] Cr  0.85 mg/dL BUN 6 mg/dL[L] 10-16 Phos 4.1 mg/dL 10-16 Alb 3.7 g/dL10-16 ALT 16 U/L AST 14 U/L Alkaline Phosphatase 74 U/L    Finger Sticks:    NUTRITIONALLY PERTINENT MEDICATIONS/LABS:  [x] Reviewed  [x] Relevant notes on medications/labs:  -- hypokalemia s/p KCl today     EDEMA:  [x] Reviewed  [] Relevant notes:    GI/ I&O:  [x] Reviewed  [] Relevant notes:  [] Other:    SKIN:   [x] No pressure injuries documented, per nursing flowsheet  [] Pressure injury previously noted  [] Change in pressure injury documentation:  [] Other:    ESTIMATED NEEDS:  [x] No change:  [] Updated:  Energy: 7930-4848  kcal/day (30-35 kcal/kg)  Protein: 81.9-93.6  g/day (1.4-1.6 g/kg)  Fluid:   ml/day or [x] defer to team  Based on: dosing weight 58.5 kg     NUTRITION DIAGNOSIS:  [x] Prior Dx: Increased Nutrient Needs, Predicted Inadequate protein-energy intake  [] New Dx:    EDUCATION:  [x] Yes:   -- Emphasized the importance of adequate kcal and protein intake, provided recommendations to optimize nutritional intake in case of decreased appetite, recommended small frequent meals by consuming nutrient-dense snacks between meals, to start with protein, and sips of supplement throughout the day.   -- Reviewed transplant food safety precautions and answered all questions as able. Discussed avoiding any take out/outside foods (including no bakery/deli items), emphasis on consuming home cooked or frozen meals. Discussed consuming bottled or filtered water. Discussed importance of adequate intake when home, including nutrient dense foods as part of diet, consuming small, frequent meals to optimize overall intake.   [] Not appropriate/warranted    NUTRITION CARE PLAN:  1. Diet: continue regular diet as tolerated     [] Achieved - Continue current nutrition intervention(s)  [] Current medical condition precludes nutrition intervention at this time.    MONITORING AND EVALUATION:   RD remains available upon request and will follow up per protocol:   Brigette Knott, MS, RDN, CDN   Available on [facility] TEAMS

## 2024-10-16 NOTE — PROGRESS NOTE ADULT - PROVIDER SPECIALTY LIST ADULT
BMT/SCT

## 2024-10-16 NOTE — PROGRESS NOTE ADULT - SUBJECTIVE AND OBJECTIVE BOX
HPC Transplant Team                                                      Critical / Counseling Time Provided: 30 minutes                                                                                                                                                        Chief Complaint: Autologous peripheral blood stem cell transplant with high dose melphalan (200mg/m2) prep regimen for treatment of multiple myeloma    Disease: MM  Type of Transplant: Autologous  Conditioning Prep: Melphalan (200mg/m2)  ABO/CMV: B pos/CMV-    S: Patient seen and examined with HPC Transplant Team:       O: Vitals:   Vital Signs Last 24 Hrs  T(C): 37.1 (16 Oct 2024 05:30), Max: 37.3 (15 Oct 2024 18:10)  T(F): 98.7 (16 Oct 2024 05:30), Max: 99.1 (15 Oct 2024 18:10)  HR: 103 (16 Oct 2024 05:30) (97 - 103)  BP: 128/76 (16 Oct 2024 05:30) (107/71 - 128/76)  BP(mean): --  RR: 18 (16 Oct 2024 05:30) (17 - 18)  SpO2: 98% (16 Oct 2024 05:30) (98% - 100%)    Parameters below as of 16 Oct 2024 05:30  Patient On (Oxygen Delivery Method): room air        Admit weight: 58.9kg       Intake / Output:   10-15 @ 07:01  -  10-16 @ 07:00  --------------------------------------------------------  IN: 600 mL / OUT: 2800 mL / NET: -2200 mL      PE:   Oropharynx: No erythema or ulcerations   Oral Mucositis:                                                        stGstrstastdstest:st st1st CVS: +S1,S2, RRR   Lungs: CTA b/l  Abdomen: +BS x 3, soft, NT/ND  Extremities: no edema BLE's  Gastric Mucositis:                                                  stGstrstastdstest:st st1st Intestinal Mucositis:                                              stGstrstastdstest:st st1st Skin: no rash  TLC: CDI  Neuro: A&O x 3  Pain: 0      Labs:     Cultures:   Culture Results:   No growth at 5 days (10.03.24 @ 14:59)    Culture Results:   No growth at 5 days (10.03.24 @ 10:45)      Meds:   Antimicrobials:   acyclovir   Oral Tab/Cap 800 milliGRAM(s) Oral every 12 hours      Heme / Onc:       GI:  pantoprazole    Tablet 40 milliGRAM(s) Oral before breakfast  senna 2 Tablet(s) Oral at bedtime PRN  sodium bicarbonate Mouth Rinse 10 milliLiter(s) Swish and Spit five times a day      Cardiovascular:       Immunologic:   filgrastim-sndz (ZARXIO) Injectable 300 MICROGram(s) SubCutaneous daily      Other medications:   acetaminophen     Tablet .. 650 milliGRAM(s) Oral every 6 hours  Biotene Dry Mouth Oral Rinse 5 milliLiter(s) Swish and Spit five times a day  chlorhexidine 2% Cloths 1 Application(s) Topical <User Schedule>  phytonadione   Solution 5 milliGRAM(s) Oral <User Schedule>      PRN:   acetaminophen     Tablet .. 650 milliGRAM(s) Oral every 6 hours PRN  prochlorperazine   Injectable 10 milliGRAM(s) IV Push every 6 hours PRN  senna 2 Tablet(s) Oral at bedtime PRN  sodium chloride 0.9% lock flush 10 milliLiter(s) IV Push every 1 hour PRN    A/P: 58 year old female with recently diagnosed multiple myeloma (3/2024) treated with 5 cycles of Jolene-VRd, admitted for an autologous pbsct with melphalan (200mg/m2) prep regimen.  Today is Day +12  10/14- some throat discomfort, otherwise feels well. No acute events overnight. Will begin discharge planning.   10/15- no acute events overnight. Discharge planning for tomorrow. Check CMV PCR   10/16- engrafted. d/c home today, to follow up outpatient tomorrow. Will give zarxio today prior to discharge. d/c levaquin, po vanco, fluconazole. CMV PCR pending.     1. Infectious Disease:   acyclovir   Oral Tab/Cap 800 milliGRAM(s) Oral every 12 hours  CMV PCR weekly post engraftment through day + 100.    2. GI Prophylaxis:    Protonix 40mg po QD     3. Mouthcare - NS / NaHCO3 rinses, Skin care     4. Transfuse & replete electrolytes prn     5. IV hydration, daily weights, strict I&O, prn diuresis     6. PO intake as tolerated, nutrition follow up as needed    7. Antiemetics, anti-diarrhea medications  prochlorperazine   Injectable 10 milliGRAM(s) IV Push every 6 hours PRN    8. OOB as tolerated, physical therapy consult if needed     9. Monitor coags 2x week, vitamin K 5mg po BIW (Mon and Thurs)    10. Monitor closely for clinical changes, monitor for fevers     11. Emotional support provided, plan of care discussed and questions addressed     12. Patient education done regarding plan of care, restrictions and discharge planning     13. Continue regular social work input     I have written the above note for Dr. Palacios who performed service with me in the room.   Aziza Booker NP-C ([phone])    I have seen and examined patient with NP, I agree with above note as scribed.                        HPC Transplant Team                                                      Critical / Counseling Time Provided: 30 minutes                                                                                                                                                        Chief Complaint: Autologous peripheral blood stem cell transplant with high dose melphalan (200mg/m2) prep regimen for treatment of multiple myeloma    Disease: MM  Type of Transplant: Autologous  Conditioning Prep: Melphalan (200mg/m2)  ABO/CMV: B pos/CMV-    S: Patient seen and examined with HPC Transplant Team:       O: Vitals:   Vital Signs Last 24 Hrs  T(C): 37.1 (16 Oct 2024 05:30), Max: 37.3 (15 Oct 2024 18:10)  T(F): 98.7 (16 Oct 2024 05:30), Max: 99.1 (15 Oct 2024 18:10)  HR: 103 (16 Oct 2024 05:30) (97 - 103)  BP: 128/76 (16 Oct 2024 05:30) (107/71 - 128/76)  BP(mean): --  RR: 18 (16 Oct 2024 05:30) (17 - 18)  SpO2: 98% (16 Oct 2024 05:30) (98% - 100%)    Parameters below as of 16 Oct 2024 05:30  Patient On (Oxygen Delivery Method): room air        Admit weight: 58.9kg       Intake / Output:   10-15 @ 07:01  -  10-16 @ 07:00  --------------------------------------------------------  IN: 600 mL / OUT: 2800 mL / NET: -2200 mL      PE:   Oropharynx: No erythema or ulcerations   Oral Mucositis:                                                        stGstrstastdstest:st st1st CVS: +S1,S2, RRR   Lungs: CTA b/l  Abdomen: +BS x 3, soft, NT/ND  Extremities: no edema BLE's  Gastric Mucositis:                                                  stGstrstastdstest:st st1st Intestinal Mucositis:                                              stGstrstastdstest:st st1st Skin: no rash  TLC: CDI  Neuro: A&O x 3  Pain: 0      Labs:                         9.4    5.56  )-----------( 29       ( 16 Oct 2024 07:20 )             27.3     10-16    138  |  99  |  6[L]  ----------------------------<  91  3.4[L]   |  25  |  0.85    Ca    8.7      16 Oct 2024 07:20  Phos  4.1     10-16  Mg     1.9     10-16    TPro  5.6[L]  /  Alb  3.7  /  TBili  0.4  /  DBili  <0.1  /  AST  14  /  ALT  16  /  AlkPhos  74  10-16      Cultures:   Culture Results:   No growth at 5 days (10.03.24 @ 14:59)    Culture Results:   No growth at 5 days (10.03.24 @ 10:45)      Meds:   Antimicrobials:   acyclovir   Oral Tab/Cap 800 milliGRAM(s) Oral every 12 hours      Heme / Onc:       GI:  pantoprazole    Tablet 40 milliGRAM(s) Oral before breakfast  senna 2 Tablet(s) Oral at bedtime PRN  sodium bicarbonate Mouth Rinse 10 milliLiter(s) Swish and Spit five times a day      Cardiovascular:       Immunologic:   filgrastim-sndz (ZARXIO) Injectable 300 MICROGram(s) SubCutaneous daily      Other medications:   acetaminophen     Tablet .. 650 milliGRAM(s) Oral every 6 hours  Biotene Dry Mouth Oral Rinse 5 milliLiter(s) Swish and Spit five times a day  chlorhexidine 2% Cloths 1 Application(s) Topical <User Schedule>  phytonadione   Solution 5 milliGRAM(s) Oral <User Schedule>      PRN:   acetaminophen     Tablet .. 650 milliGRAM(s) Oral every 6 hours PRN  prochlorperazine   Injectable 10 milliGRAM(s) IV Push every 6 hours PRN  senna 2 Tablet(s) Oral at bedtime PRN  sodium chloride 0.9% lock flush 10 milliLiter(s) IV Push every 1 hour PRN    A/P: 58 year old female with recently diagnosed multiple myeloma (3/2024) treated with 5 cycles of Jolene-VRd, admitted for an autologous pbsct with melphalan (200mg/m2) prep regimen.  Today is Day +12  10/14- some throat discomfort, otherwise feels well. No acute events overnight. Will begin discharge planning.   10/15- no acute events overnight. Discharge planning for tomorrow. Check CMV PCR   10/16- engrafted. d/c home today, to follow up outpatient tomorrow. Will give zarxio today prior to discharge. d/c levaquin, po vanco, fluconazole. CMV PCR pending.     1. Infectious Disease:   acyclovir   Oral Tab/Cap 800 milliGRAM(s) Oral every 12 hours  CMV PCR weekly post engraftment through day + 100.    2. GI Prophylaxis:    Protonix 40mg po QD     3. Mouthcare - NS / NaHCO3 rinses, Skin care     4. Transfuse & replete electrolytes prn   KCl 40mEq po x 1     5. IV hydration, daily weights, strict I&O, prn diuresis     6. PO intake as tolerated, nutrition follow up as needed    7. Antiemetics, anti-diarrhea medications  prochlorperazine   Injectable 10 milliGRAM(s) IV Push every 6 hours PRN    8. OOB as tolerated, physical therapy consult if needed     9. Monitor coags 2x week, vitamin K 5mg po BIW (Mon and Thurs)    10. Monitor closely for clinical changes, monitor for fevers     11. Emotional support provided, plan of care discussed and questions addressed     12. Patient education done regarding plan of care, restrictions and discharge planning     13. Continue regular social work input     I have written the above note for Dr. Palacios who performed service with me in the room.   Aziza Booker NP-C ([phone])    I have seen and examined patient with NP, I agree with above note as scribed.                        HPC Transplant Team                                                      Critical / Counseling Time Provided: 30 minutes                                                                                                                                                        Chief Complaint: Autologous peripheral blood stem cell transplant with high dose melphalan (200mg/m2) prep regimen for treatment of multiple myeloma    Disease: MM  Type of Transplant: Autologous  Conditioning Prep: Melphalan (200mg/m2)  ABO/CMV: B pos/CMV-    S: Patient seen and examined with HPC Transplant Team:   Has no complaints, wants to go home  Requests double lumen tunnelled catheter removed prior to discharge       O: Vitals:   Vital Signs Last 24 Hrs  T(C): 37.1 (16 Oct 2024 05:30), Max: 37.3 (15 Oct 2024 18:10)  T(F): 98.7 (16 Oct 2024 05:30), Max: 99.1 (15 Oct 2024 18:10)  HR: 103 (16 Oct 2024 05:30) (97 - 103)  BP: 128/76 (16 Oct 2024 05:30) (107/71 - 128/76)  BP(mean): --  RR: 18 (16 Oct 2024 05:30) (17 - 18)  SpO2: 98% (16 Oct 2024 05:30) (98% - 100%)    Parameters below as of 16 Oct 2024 05:30  Patient On (Oxygen Delivery Method): room air        Admit weight: 58.9kg       Intake / Output:   10-15 @ 07:01  -  10-16 @ 07:00  --------------------------------------------------------  IN: 600 mL / OUT: 2800 mL / NET: -2200 mL      PE:   Oropharynx: No erythema or ulcerations   Oral Mucositis:                                                        stGstrstastdstest:st st1st CVS: +S1,S2, RRR   Lungs: CTA b/l  Abdomen: +BS x 3, soft, NT/ND  Extremities: no edema BLE's  Gastric Mucositis:                                                  stGstrstastdstest:st st1st Intestinal Mucositis:                                              stGstrstastdstest:st st1st Skin: no rash  TLC: CDI  Neuro: A&O x 3  Pain: 0      Labs:                         9.4    5.56  )-----------( 29       ( 16 Oct 2024 07:20 )             27.3     10-16    138  |  99  |  6[L]  ----------------------------<  91  3.4[L]   |  25  |  0.85    Ca    8.7      16 Oct 2024 07:20  Phos  4.1     10-16  Mg     1.9     10-16    TPro  5.6[L]  /  Alb  3.7  /  TBili  0.4  /  DBili  <0.1  /  AST  14  /  ALT  16  /  AlkPhos  74  10-16      Cultures:   Culture Results:   No growth at 5 days (10.03.24 @ 14:59)    Culture Results:   No growth at 5 days (10.03.24 @ 10:45)      Meds:   Antimicrobials:   acyclovir   Oral Tab/Cap 800 milliGRAM(s) Oral every 12 hours      Heme / Onc:       GI:  pantoprazole    Tablet 40 milliGRAM(s) Oral before breakfast  senna 2 Tablet(s) Oral at bedtime PRN  sodium bicarbonate Mouth Rinse 10 milliLiter(s) Swish and Spit five times a day      Cardiovascular:       Immunologic:   filgrastim-sndz (ZARXIO) Injectable 300 MICROGram(s) SubCutaneous daily      Other medications:   acetaminophen     Tablet .. 650 milliGRAM(s) Oral every 6 hours  Biotene Dry Mouth Oral Rinse 5 milliLiter(s) Swish and Spit five times a day  chlorhexidine 2% Cloths 1 Application(s) Topical <User Schedule>  phytonadione   Solution 5 milliGRAM(s) Oral <User Schedule>      PRN:   acetaminophen     Tablet .. 650 milliGRAM(s) Oral every 6 hours PRN  prochlorperazine   Injectable 10 milliGRAM(s) IV Push every 6 hours PRN  senna 2 Tablet(s) Oral at bedtime PRN  sodium chloride 0.9% lock flush 10 milliLiter(s) IV Push every 1 hour PRN    A/P: 58 year old female with recently diagnosed multiple myeloma (3/2024) treated with 5 cycles of Jolene-VRd, admitted for an autologous pbsct with melphalan (200mg/m2) prep regimen.  Today is Day +12  10/14- some throat discomfort, otherwise feels well. No acute events overnight. Will begin discharge planning.   10/15- no acute events overnight. Discharge planning for tomorrow. Check CMV PCR   10/16- engrafted. d/c home today, to follow up outpatient tomorrow. Will give zarxio today prior to discharge. d/c levaquin, po vanco, fluconazole. CMV PCR pending.     1. Infectious Disease:   acyclovir   Oral Tab/Cap 800 milliGRAM(s) Oral every 12 hours  CMV PCR weekly post engraftment through day + 100.    2. GI Prophylaxis:    Protonix 40mg po QD     3. Mouthcare - NS / NaHCO3 rinses, Skin care     4. Transfuse & replete electrolytes prn   KCl 40mEq po x 1     5. IV hydration, daily weights, strict I&O, prn diuresis     6. PO intake as tolerated, nutrition follow up as needed    7. Antiemetics, anti-diarrhea medications  prochlorperazine   Injectable 10 milliGRAM(s) IV Push every 6 hours PRN    8. OOB as tolerated, physical therapy consult if needed     9. Monitor coags 2x week, vitamin K 5mg po BIW (Mon and Thurs)    10. Monitor closely for clinical changes, monitor for fevers     11. Emotional support provided, plan of care discussed and questions addressed     12. Patient education done regarding plan of care, restrictions and discharge planning     13. Continue regular social work input     I have written the above note for Dr. Palacios who performed service with me in the room.   Aziza Booker NP-C ([phone])    I have seen and examined patient with NP, I agree with above note as scribed.

## 2024-10-16 NOTE — PROCEDURE NOTE - PROCEDURE FINDINGS AND DETAILS
Site prepped and draped in usual sterile fashion. Catheter was removed without difficulty. Direct jugular and site pressure applied x10min, hemostasis achieved. Dry sterile dressing applied.

## 2024-10-16 NOTE — CHART NOTE - NSCHARTNOTEFT_GEN_A_CORE
Received call from primary team for tunneled HD catheter removal for discharge today  Patient has completed treatment and does not require any additional transfusions       - case reviewed and approved for tunneled HD catheter removal today   - please place IR procedure order under MANNY Campbell (free text)   - STAT labs in AM (cbc,coags, bmp, T&S)  - hold AC   - d/w primary team      After permacath removed:     -Check site with vitals q15min x1hr  -Keep pt upright x2hrs  -Keep site dry x48hrs      -Please contact IR with any questions/ concerns regarding above

## 2024-10-16 NOTE — PRE PROCEDURE NOTE - PRE PROCEDURE EVALUATION
Interventional Radiology    HPI: 58 year old female with recently diagnosed multiple myeloma (3/2024) treated with 5 cycles of Jolene-VRd, admitted for an autologous pbsct with melphalan (200mg/m2) prep regimen. Patient completed treatment and no longer requiring transfusion. Patient presents to IR for tunneled HD catheter removal.     Allergies: erythromycin (Rash)  penicillins (Rash)    Medications (Abx/Cardiac/Anticoagulation/Blood Products)  acyclovir   Oral Tab/Cap: 800 milliGRAM(s) Oral (10-16 @ 05:40)  fluconAZOLE   Tablet: 400 milliGRAM(s) Oral (10-15 @ 11:33)  levoFLOXacin  Tablet: 500 milliGRAM(s) Oral (10-15 @ 11:33)  vancomycin    Solution: 125 milliGRAM(s) Oral (10-16 @ 05:40)    Data:    T(C): 37.1  HR: 106  BP: 123/80  RR: 18  SpO2: 100%    Exam  General: No acute distress  Chest: Non labored breathing    -WBC 5.56 / HgB 9.4 / Hct 27.3 / Plt 29  -Na 138 / Cl 99 / BUN 6 / Glucose 91  -K 3.4 / CO2 25 / Cr 0.85  -ALT 16 / Alk Phos 74 / T.Bili 0.4  -INR1.11    Imaging:     Plan: 58y Female presents for tunneled HD catheter removal   -Risks/Benefits/alternatives explained with the patient and/or healthcare proxy and witnessed informed consent obtained.

## 2024-10-16 NOTE — PROCEDURE NOTE - PLAN
-Keep pt upright x2hrs  -Keep site dry x48hrs  -Check site with vitals q15min x1hr  -Please contact IR with any questions/ concerns regarding above

## 2024-10-16 NOTE — PROGRESS NOTE ADULT - NS ATTEST RISK GEN_ALL_CORE
Risk Statement (NON-critical care)

## 2024-10-16 NOTE — DISCHARGE NOTE NURSING/CASE MANAGEMENT/SOCIAL WORK - PATIENT PORTAL LINK FT
You can access the FollowMyHealth Patient Portal offered by [facility] by registering at the following website: [email or web address]. By joining [facility]’s FollowMyHealth portal, you will also be able to view your health information using other applications (apps) compatible with our system.

## 2024-10-16 NOTE — DISCHARGE NOTE NURSING/CASE MANAGEMENT/SOCIAL WORK - FINANCIAL ASSISTANCE
[facility] provides services at a reduced cost to those who are determined to be eligible through [facility]’s financial assistance program. Information regarding [facility]’s financial assistance program can be found by going to [email or web address] or by calling [phone].

## 2024-10-16 NOTE — PROGRESS NOTE ADULT - NS ATTEST RISK PROBLEM GEN_ALL_CORE FT
myeloma for myeloablative chemotherapy followed by autoPSCT with risk of life-threatening infections, bleeding and other complications.

## 2024-10-16 NOTE — PROGRESS NOTE ADULT - NS ATTEND AMEND GEN_ALL_CORE FT
58 year old female admitted for auto-HSCT for  multiple myeloma with melphalan (200mg/m2) prep regimen. Day +12    Overall, patient is doing well with no specific complaints.   wbc 0.98; continue to monitor on filgrastim; likely discharge tomorrow    continue supportive care and anti-infectious prophylaxis   All questions were answered.     Patient was seen in IDR with nurses, ACP and Pharmacist.   I personally examined the patient; data reviewed. I addressed patient's questions. 58 year old female admitted for auto-HSCT for  multiple myeloma with melphalan (200mg/m2) prep regimen. Day +12    Overall, patient is doing well with no specific complaints.   Engrafted today; to be discharged home, followup in clinic tomorrow.   continue supportive care and anti-infectious prophylaxis   All questions were answered.     Patient was seen in IDR with nurses, ACP and Pharmacist.   I personally examined the patient; data reviewed. I addressed patient's questions.

## 2024-10-16 NOTE — CHART NOTE - NSCHARTNOTESELECT_GEN_ALL_CORE
Interventional Radiology
Nutrition Services
Stem cells infusion/Event Note
Nutrition Services
Nutrition Services

## 2024-10-16 NOTE — PHARMACOTHERAPY INTERVENTION NOTE - COMMENTS
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 Today is day +10. Course has been uncomplicated.    Start levofloxacin 500 mg PO QD and vancomycin 125 mg PO BID since ANC <1000 (10/11) and fluconazole 400 mg PO QD since ANC <500 (10/11). Plan is to continue until engraftment (ANC >500). Patient started GCSF on day +5. Plan is to continue until ANC >1500 x2 days. Will start SMZ/TMP upon engraftment.    Sent Rx for discharge medications (Acyclovir, SMZ/TMP and ondansetron)  -> will request delivery in anticipation of discharge within next coming days.    In case patient becomes febrile (100.4F sustained over an hour or one temp of 101F and above), please order aztreonam 2 g every 8 hours and vancomycin 1000 mg every 12 hours. Please draw vanco trough prior to 4th dose. Please continue vancomycin even if blood cultures have no gram positive growth x48 hours since aztreonam has no gram positive coverage.    Daria Combs, PharmD, BCOP  Stem Cell Transplant Clinical Pharmacy Specialist  Available via Microsoft Teams
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 Today is day +11. Course has been uncomplicated.    Started levofloxacin 500 mg PO QD and vancomycin 125 mg PO BID since ANC <1000 (10/11) and fluconazole 400 mg PO QD since ANC <500 (10/11). Plan is to continue until engraftment (ANC >500). Patient started GCSF on day +5. Plan is to continue until ANC >1500 x2 days. Will start SMZ/TMP upon engraftment.    Sent Rx for discharge medications (Acyclovir, SMZ/TMP and ondansetron)  -> requested delivery in anticipation of discharge within next coming days.    In case patient becomes febrile (100.4F sustained over an hour or one temp of 101F and above), please order aztreonam 2 g every 8 hours and vancomycin 1000 mg every 12 hours. Please draw vanco trough prior to 4th dose. Please continue vancomycin even if blood cultures have no gram positive growth x48 hours since aztreonam has no gram positive coverage.    Daria Combs, PharmD, BCOP  Stem Cell Transplant Clinical Pharmacy Specialist  Available via Microsoft Teams
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 Today is day +7. Course has been uncomplicated.    Start levofloxacin 500 mg PO QD and vancomycin 125 mg PO BID since ANC <1000 (10/11) and fluconazole 400 mg PO QD since ANC <500 (10/11). Plan is to continue until engraftment (ANC >500). Patient started GCSF on day +5. Plan is to continue until ANC >1500 x2 days.     In case patient becomes febrile (100.4F sustained over an hour or one temp of 101F and above), please order aztreonam 2 g every 8 hours and vancomycin 1000 mg every 12 hours. Please draw vanco trough prior to 4th dose. Please continue vancomycin even if blood cultures have no gram positive growth x48 hours since aztreonam has no gram positive coverage.    Daria Combs, PharmD, BCOP  Stem Cell Transplant Clinical Pharmacy Specialist  Available via Microsoft Teams
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 Today is day +4. Course has been uncomplicated.    Will start levofloxacin 500 mg PO QD and vancomycin 125 mg PO BID once ANC <1000 and fluconazole 400 mg PO QD once ANC <500. Plan is to continue until engraftment (ANC >500). Patient starts GCSF on day +5. Plan is to continue until ANC >1500 x2 days.     In case patient becomes febrile (100.4F sustained over an hour or one temp of 101F and above), please order aztreonam 2 g every 8 hours and vancomycin 1000 mg every 12 hours. Please draw vanco trough prior to 4th dose. Please continue vancomycin even if blood cultures have no gram positive growth x48 hours since aztreonam has no gram positive coverage.    Daria Combs, PharmD, BCOP  Stem Cell Transplant Clinical Pharmacy Specialist  Available via Microsoft Teams  
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 Today is day +5. Course has been uncomplicated.    Will start levofloxacin 500 mg PO QD and vancomycin 125 mg PO BID once ANC <1000 and fluconazole 400 mg PO QD once ANC <500. Plan is to continue until engraftment (ANC >500). Patient starts GCSF on day +5. Plan is to continue until ANC >1500 x2 days.     In case patient becomes febrile (100.4F sustained over an hour or one temp of 101F and above), please order aztreonam 2 g every 8 hours and vancomycin 1000 mg every 12 hours. Please draw vanco trough prior to 4th dose. Please continue vancomycin even if blood cultures have no gram positive growth x48 hours since aztreonam has no gram positive coverage.    Daria Combs, PharmD, BCOP  Stem Cell Transplant Clinical Pharmacy Specialist  Available via Microsoft Teams
Allergies    erythromycin (Rash)  penicillins (Rash)    Intolerances    MEDICATIONS  (STANDING):  acetaminophen     Tablet .. 650 milliGRAM(s) Oral every 6 hours  acyclovir   Oral Tab/Cap 800 milliGRAM(s) Oral every 12 hours  Biotene Dry Mouth Oral Rinse 5 milliLiter(s) Swish and Spit five times a day  chlorhexidine 2% Cloths 1 Application(s) Topical <User Schedule>  filgrastim-sndz (ZARXIO) Injectable 300 MICROGram(s) SubCutaneous daily  pantoprazole    Tablet 40 milliGRAM(s) Oral before breakfast  phytonadione   Solution 5 milliGRAM(s) Oral <User Schedule>  sodium bicarbonate Mouth Rinse 10 milliLiter(s) Swish and Spit five times a day    MEDICATIONS  (PRN):  acetaminophen     Tablet .. 650 milliGRAM(s) Oral every 6 hours PRN Temp greater or equal to 38C (100.4F), Mild Pain (1 - 3)  prochlorperazine   Injectable 10 milliGRAM(s) IV Push every 6 hours PRN nausea/vomiting  senna 2 Tablet(s) Oral at bedtime PRN Constipation  sodium chloride 0.9% lock flush 10 milliLiter(s) IV Push every 1 hour PRN Pre/post blood products, medications, blood draw, and to maintain line patency    Recipient of Education:  [X]Patient  [  ]Family, please specify ___________  [  ]Other, please specify ____________    Readiness to Learn:  [X]Ready  [  ]Not ready: cognitive  [  ]Not ready: physical  [  ]Not ready: emotional  Comment(s):    Barriers to Information Exhange:  [X]None identified  [  ]Vision  [  ]Hearing  [  ]Language  [  ]Literacy  [  ]Memory and Learning  [  ]Culture/Nondenominational  [  ]Other, please specify ____________  Comment(s):    Patient Education Topics:  [  ]Anticoagulation  [  ]Heart Failure (HF)  [  ]Chronic Obstructive Pulmonary Disease (COPD)  [  ]Diabetes Mellitus (DM)  [  ]Stroke  [X]Other, please specify: post autoSCT discharge counseling  Comment(s):    Medication Counseling Points:  [X]Medications Reviewed  [X]Medication Schedule  [  ]Precautions  [X]Side Effects  [X]Indication for Use  [  ]Drug/Drug Interactions  [  ]Drug/Food Interactions  [  ]Other, please specify ____________  Comment(s):    Teaching Method:  [X]Verbal Instruction  [X]Written Material, please specify: personalized medication sheet  [  ]Skill Demonstration  [  ]Teach Back (Patient repeats in own words)  [  ]Ask Me 3  [  ]Computer/Internet  [  ]Other, please specify ____________  Comment(s):    Educational Evaluation:  [  ]Meets goals/outcomes  [  ]Partially meets - needs review/practice  [  ]Unable to meet - needs instruction  [  ]Reinforced previously met goal  [  ]Patient declines instruction  [  ]Not applicable  Comment(s):    58-year-old female with PMH of DCIS (s/p excision in 2020), GERD and 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 Today is day +12. Course has been uncomplicated.    Counseled patient today for discharge on new transplant medications including anti-infectives and PRN nausea meds. Reviewed doses, indications, efficacy and safety monitoring parameters, storage, and medication timeline.  Also counseled patient on the importance of adherence. Advised patient to: wear sunblock SPF>30 secondary to immunosuppressive state increasing risk of skin cancer; avoid over-the-counter NSAIDs, herbal, or dietary supplements as they may be harmful to the kidney and to contact the clinic before initiating any new OTC or RX medications.    Patient expressed understanding and teach back method was used to confirm. Provided patient with a personalized medication chart. All questions were answered to the patient's satisfaction and medication sheet was provided with medication name, strength, schedule, indication, and special instructions.        Time spent: 20 minutes    Daria Combs, PharmD, BCOP  Stem Cell Transplant Clinical Pharmacy Specialist  Available via Microsoft Teams
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 Today is day +6. Course has been uncomplicated.    Will start levofloxacin 500 mg PO QD and vancomycin 125 mg PO BID once ANC <1000 and fluconazole 400 mg PO QD once ANC <500. Plan is to continue until engraftment (ANC >500). Patient started GCSF on day +5. Plan is to continue until ANC >1500 x2 days.     In case patient becomes febrile (100.4F sustained over an hour or one temp of 101F and above), please order aztreonam 2 g every 8 hours and vancomycin 1000 mg every 12 hours. Please draw vanco trough prior to 4th dose. Please continue vancomycin even if blood cultures have no gram positive growth x48 hours since aztreonam has no gram positive coverage.    Daria Combs, PharmD, BCOP  Stem Cell Transplant Clinical Pharmacy Specialist  Available via [employer] Teams

## 2024-10-16 NOTE — DISCHARGE NOTE NURSING/CASE MANAGEMENT/SOCIAL WORK - NSDCFUADDAPPT_GEN_ALL_CORE_FT
You have the following appointments at the [facility]:    Thursday, 10/17/24   1:30pm with Dr. Palacios  Please arrive 1/2 hour early to your appointment

## 2024-10-17 ENCOUNTER — APPOINTMENT (OUTPATIENT)
Dept: HEMATOLOGY ONCOLOGY | Facility: CLINIC | Age: 59
End: 2024-10-17
Payer: COMMERCIAL

## 2024-10-17 ENCOUNTER — RESULT REVIEW (OUTPATIENT)
Age: 59
End: 2024-10-17

## 2024-10-17 VITALS
HEART RATE: 98 BPM | SYSTOLIC BLOOD PRESSURE: 125 MMHG | DIASTOLIC BLOOD PRESSURE: 69 MMHG | WEIGHT: 124.66 LBS | BODY MASS INDEX: 22.94 KG/M2 | TEMPERATURE: 97.6 F | HEIGHT: 62 IN | OXYGEN SATURATION: 99 % | RESPIRATION RATE: 19 BRPM

## 2024-10-17 PROBLEM — Z94.84 H/O AUTOLOGOUS STEM CELL TRANSPLANT: Status: ACTIVE | Noted: 2024-10-15

## 2024-10-17 LAB
CMV DNA CSF QL NAA+PROBE: SIGNIFICANT CHANGE UP IU/ML
CMV DNA SPEC NAA+PROBE-LOG#: SIGNIFICANT CHANGE UP LOG10IU/ML

## 2024-10-17 PROCEDURE — G2211 COMPLEX E/M VISIT ADD ON: CPT

## 2024-10-17 PROCEDURE — 99215 OFFICE O/P EST HI 40 MIN: CPT

## 2024-10-17 RX ORDER — SULFAMETHOXAZOLE AND TRIMETHOPRIM 400; 80 MG/1; MG/1
400-80 TABLET ORAL TWICE DAILY
Qty: 60 | Refills: 0 | Status: ACTIVE | COMMUNITY
Start: 2024-10-17 | End: 1900-01-01

## 2024-10-17 RX ORDER — SULFAMETHOXAZOLE AND TRIMETHOPRIM 400; 80 MG/1; MG/1
400-80 TABLET ORAL
Refills: 0 | Status: ACTIVE | COMMUNITY

## 2024-10-17 RX ORDER — ACYCLOVIR 800 MG/1
800 TABLET ORAL
Refills: 0 | Status: ACTIVE | COMMUNITY

## 2024-10-17 RX ADMIN — ACYCLOVIR 1 MG: 800 TABLET ORAL at 00:00

## 2024-10-18 ENCOUNTER — OUTPATIENT (OUTPATIENT)
Dept: OUTPATIENT SERVICES | Facility: HOSPITAL | Age: 59
LOS: 1 days | Discharge: HOME | End: 2024-10-18

## 2024-10-18 DIAGNOSIS — Z98.891 HISTORY OF UTERINE SCAR FROM PREVIOUS SURGERY: Chronic | ICD-10-CM

## 2024-10-18 DIAGNOSIS — Z90.49 ACQUIRED ABSENCE OF OTHER SPECIFIED PARTS OF DIGESTIVE TRACT: Chronic | ICD-10-CM

## 2024-10-18 DIAGNOSIS — D64.9 ANEMIA, UNSPECIFIED: ICD-10-CM

## 2024-10-18 DIAGNOSIS — D47.2 MONOCLONAL GAMMOPATHY: ICD-10-CM

## 2024-10-20 LAB
ALBUMIN SERPL ELPH-MCNC: 3.9 G/DL
ALP BLD-CCNC: 98 U/L
ALT SERPL-CCNC: 19 U/L
ANION GAP SERPL CALC-SCNC: 15 MMOL/L
AST SERPL-CCNC: 22 U/L
BILIRUB SERPL-MCNC: 0.3 MG/DL
BUN SERPL-MCNC: 6 MG/DL
CALCIUM SERPL-MCNC: 9.3 MG/DL
CHLORIDE SERPL-SCNC: 97 MMOL/L
CO2 SERPL-SCNC: 24 MMOL/L
CREAT SERPL-MCNC: 0.8 MG/DL
EGFR: 84 ML/MIN/1.73M2
GLUCOSE SERPL-MCNC: 120 MG/DL
LDH SERPL-CCNC: 303 U/L
MAGNESIUM SERPL-MCNC: 1.9 MG/DL
POTASSIUM SERPL-SCNC: 3.6 MMOL/L
PROT SERPL-MCNC: 6 G/DL
SODIUM SERPL-SCNC: 136 MMOL/L

## 2024-10-22 ENCOUNTER — APPOINTMENT (OUTPATIENT)
Dept: HEMATOLOGY ONCOLOGY | Facility: CLINIC | Age: 59
End: 2024-10-22
Payer: COMMERCIAL

## 2024-10-22 ENCOUNTER — RESULT REVIEW (OUTPATIENT)
Age: 59
End: 2024-10-22

## 2024-10-22 VITALS
RESPIRATION RATE: 17 BRPM | WEIGHT: 124.56 LBS | SYSTOLIC BLOOD PRESSURE: 123 MMHG | DIASTOLIC BLOOD PRESSURE: 78 MMHG | OXYGEN SATURATION: 99 % | BODY MASS INDEX: 22.78 KG/M2 | HEART RATE: 108 BPM | TEMPERATURE: 99 F

## 2024-10-22 PROCEDURE — 99214 OFFICE O/P EST MOD 30 MIN: CPT

## 2024-10-27 LAB
ALBUMIN SERPL ELPH-MCNC: 3.9 G/DL
ALP BLD-CCNC: 70 U/L
ALT SERPL-CCNC: 42 U/L
ANION GAP SERPL CALC-SCNC: 11 MMOL/L
AST SERPL-CCNC: 33 U/L
BILIRUB SERPL-MCNC: 0.3 MG/DL
BUN SERPL-MCNC: 11 MG/DL
CALCIUM SERPL-MCNC: 9.4 MG/DL
CHLORIDE SERPL-SCNC: 100 MMOL/L
CMV DNA SPEC QL NAA+PROBE: NOT DETECTED IU/ML
CMVPCR LOG: NOT DETECTED LOG10IU/ML
CO2 SERPL-SCNC: 24 MMOL/L
CREAT SERPL-MCNC: 0.79 MG/DL
EGFR: 86 ML/MIN/1.73M2
GLUCOSE SERPL-MCNC: 103 MG/DL
LDH SERPL-CCNC: 236 U/L
MAGNESIUM SERPL-MCNC: 2.1 MG/DL
POTASSIUM SERPL-SCNC: 4.6 MMOL/L
PROT SERPL-MCNC: 6 G/DL
SODIUM SERPL-SCNC: 135 MMOL/L

## 2024-10-28 ENCOUNTER — RESULT REVIEW (OUTPATIENT)
Age: 59
End: 2024-10-28

## 2024-10-28 ENCOUNTER — APPOINTMENT (OUTPATIENT)
Dept: HEMATOLOGY ONCOLOGY | Facility: CLINIC | Age: 59
End: 2024-10-28
Payer: COMMERCIAL

## 2024-10-28 VITALS
WEIGHT: 126.52 LBS | TEMPERATURE: 97.3 F | BODY MASS INDEX: 23.14 KG/M2 | DIASTOLIC BLOOD PRESSURE: 71 MMHG | RESPIRATION RATE: 18 BRPM | SYSTOLIC BLOOD PRESSURE: 129 MMHG | HEART RATE: 69 BPM | OXYGEN SATURATION: 99 %

## 2024-10-28 DIAGNOSIS — Z94.84 STEM CELLS TRANSPLANT STATUS: ICD-10-CM

## 2024-10-28 LAB
ALBUMIN SERPL ELPH-MCNC: 3.9 G/DL
ALP BLD-CCNC: 59 U/L
ALT SERPL-CCNC: 30 U/L
ANION GAP SERPL CALC-SCNC: 9 MMOL/L
AST SERPL-CCNC: 28 U/L
BASOPHILS # BLD AUTO: 0.09 K/UL — SIGNIFICANT CHANGE UP (ref 0–0.2)
BASOPHILS NFR BLD AUTO: 2.4 % — HIGH (ref 0–2)
BILIRUB SERPL-MCNC: 0.3 MG/DL
BUN SERPL-MCNC: 10 MG/DL
CALCIUM SERPL-MCNC: 9.4 MG/DL
CHLORIDE SERPL-SCNC: 103 MMOL/L
CO2 SERPL-SCNC: 25 MMOL/L
CREAT SERPL-MCNC: 0.76 MG/DL
EGFR: 90 ML/MIN/1.73M2
EOSINOPHIL # BLD AUTO: 0.01 K/UL — SIGNIFICANT CHANGE UP (ref 0–0.5)
EOSINOPHIL NFR BLD AUTO: 0.3 % — SIGNIFICANT CHANGE UP (ref 0–6)
GLUCOSE SERPL-MCNC: 99 MG/DL
HCT VFR BLD CALC: 29.6 % — LOW (ref 34.5–45)
HGB BLD-MCNC: 9.7 G/DL — LOW (ref 11.5–15.5)
IMM GRANULOCYTES NFR BLD AUTO: 0.5 % — SIGNIFICANT CHANGE UP (ref 0–0.9)
LDH SERPL-CCNC: 206 U/L
LYMPHOCYTES # BLD AUTO: 1.23 K/UL — SIGNIFICANT CHANGE UP (ref 1–3.3)
LYMPHOCYTES # BLD AUTO: 32.3 % — SIGNIFICANT CHANGE UP (ref 13–44)
MAGNESIUM SERPL-MCNC: 2.2 MG/DL
MCHC RBC-ENTMCNC: 32.2 PG — SIGNIFICANT CHANGE UP (ref 27–34)
MCHC RBC-ENTMCNC: 32.8 G/DL — SIGNIFICANT CHANGE UP (ref 32–36)
MCV RBC AUTO: 98.3 FL — SIGNIFICANT CHANGE UP (ref 80–100)
MONOCYTES # BLD AUTO: 0.56 K/UL — SIGNIFICANT CHANGE UP (ref 0–0.9)
MONOCYTES NFR BLD AUTO: 14.7 % — HIGH (ref 2–14)
NEUTROPHILS # BLD AUTO: 1.9 K/UL — SIGNIFICANT CHANGE UP (ref 1.8–7.4)
NEUTROPHILS NFR BLD AUTO: 49.8 % — SIGNIFICANT CHANGE UP (ref 43–77)
NRBC # BLD: 0 /100 WBCS — SIGNIFICANT CHANGE UP (ref 0–0)
PLATELET # BLD AUTO: 568 K/UL — HIGH (ref 150–400)
POTASSIUM SERPL-SCNC: 4.7 MMOL/L
PROT SERPL-MCNC: 6.1 G/DL
RBC # BLD: 3.01 M/UL — LOW (ref 3.8–5.2)
RBC # FLD: 15.1 % — HIGH (ref 10.3–14.5)
SODIUM SERPL-SCNC: 138 MMOL/L
WBC # BLD: 3.81 K/UL — SIGNIFICANT CHANGE UP (ref 3.8–10.5)
WBC # FLD AUTO: 3.81 K/UL — SIGNIFICANT CHANGE UP (ref 3.8–10.5)

## 2024-10-28 PROCEDURE — G2211 COMPLEX E/M VISIT ADD ON: CPT

## 2024-10-28 PROCEDURE — 99215 OFFICE O/P EST HI 40 MIN: CPT

## 2024-10-30 LAB
CMV DNA SPEC QL NAA+PROBE: NOT DETECTED IU/ML
CMVPCR LOG: NOT DETECTED LOG10IU/ML

## 2024-11-11 ENCOUNTER — RESULT REVIEW (OUTPATIENT)
Age: 59
End: 2024-11-11

## 2024-11-11 ENCOUNTER — APPOINTMENT (OUTPATIENT)
Dept: HEMATOLOGY ONCOLOGY | Facility: CLINIC | Age: 59
End: 2024-11-11
Payer: COMMERCIAL

## 2024-11-11 VITALS
TEMPERATURE: 98.7 F | WEIGHT: 123.68 LBS | DIASTOLIC BLOOD PRESSURE: 70 MMHG | HEART RATE: 104 BPM | RESPIRATION RATE: 17 BRPM | OXYGEN SATURATION: 98 % | BODY MASS INDEX: 22.62 KG/M2 | SYSTOLIC BLOOD PRESSURE: 120 MMHG

## 2024-11-11 DIAGNOSIS — C90.00 MULTIPLE MYELOMA NOT HAVING ACHIEVED REMISSION: ICD-10-CM

## 2024-11-11 DIAGNOSIS — Z94.84 STEM CELLS TRANSPLANT STATUS: ICD-10-CM

## 2024-11-11 LAB
ALBUMIN SERPL ELPH-MCNC: 4.1 G/DL
ALP BLD-CCNC: 54 U/L
ALT SERPL-CCNC: 20 U/L
ANION GAP SERPL CALC-SCNC: 10 MMOL/L
AST SERPL-CCNC: 28 U/L
BASOPHILS # BLD AUTO: 0.05 K/UL — SIGNIFICANT CHANGE UP (ref 0–0.2)
BASOPHILS NFR BLD AUTO: 0.9 % — SIGNIFICANT CHANGE UP (ref 0–2)
BILIRUB SERPL-MCNC: 0.5 MG/DL
BUN SERPL-MCNC: 13 MG/DL
CALCIUM SERPL-MCNC: 9.7 MG/DL
CHLORIDE SERPL-SCNC: 102 MMOL/L
CO2 SERPL-SCNC: 26 MMOL/L
CREAT SERPL-MCNC: 1.04 MG/DL
EGFR: 62 ML/MIN/1.73M2
EOSINOPHIL # BLD AUTO: 0.17 K/UL — SIGNIFICANT CHANGE UP (ref 0–0.5)
EOSINOPHIL NFR BLD AUTO: 3.1 % — SIGNIFICANT CHANGE UP (ref 0–6)
GLUCOSE SERPL-MCNC: 98 MG/DL
HCT VFR BLD CALC: 32.2 % — LOW (ref 34.5–45)
HGB BLD-MCNC: 10.8 G/DL — LOW (ref 11.5–15.5)
IMM GRANULOCYTES NFR BLD AUTO: 0.2 % — SIGNIFICANT CHANGE UP (ref 0–0.9)
LDH SERPL-CCNC: 174 U/L
LYMPHOCYTES # BLD AUTO: 1.27 K/UL — SIGNIFICANT CHANGE UP (ref 1–3.3)
LYMPHOCYTES # BLD AUTO: 23.1 % — SIGNIFICANT CHANGE UP (ref 13–44)
MAGNESIUM SERPL-MCNC: 2.3 MG/DL
MCHC RBC-ENTMCNC: 32.6 PG — SIGNIFICANT CHANGE UP (ref 27–34)
MCHC RBC-ENTMCNC: 33.5 G/DL — SIGNIFICANT CHANGE UP (ref 32–36)
MCV RBC AUTO: 97.3 FL — SIGNIFICANT CHANGE UP (ref 80–100)
MONOCYTES # BLD AUTO: 0.4 K/UL — SIGNIFICANT CHANGE UP (ref 0–0.9)
MONOCYTES NFR BLD AUTO: 7.3 % — SIGNIFICANT CHANGE UP (ref 2–14)
NEUTROPHILS # BLD AUTO: 3.59 K/UL — SIGNIFICANT CHANGE UP (ref 1.8–7.4)
NEUTROPHILS NFR BLD AUTO: 65.4 % — SIGNIFICANT CHANGE UP (ref 43–77)
NRBC # BLD: 0 /100 WBCS — SIGNIFICANT CHANGE UP (ref 0–0)
PLATELET # BLD AUTO: 236 K/UL — SIGNIFICANT CHANGE UP (ref 150–400)
POTASSIUM SERPL-SCNC: 4.9 MMOL/L
PROT SERPL-MCNC: 6.2 G/DL
RBC # BLD: 3.31 M/UL — LOW (ref 3.8–5.2)
RBC # FLD: 16.7 % — HIGH (ref 10.3–14.5)
SODIUM SERPL-SCNC: 138 MMOL/L
WBC # BLD: 5.49 K/UL — SIGNIFICANT CHANGE UP (ref 3.8–10.5)
WBC # FLD AUTO: 5.49 K/UL — SIGNIFICANT CHANGE UP (ref 3.8–10.5)

## 2024-11-11 PROCEDURE — G2211 COMPLEX E/M VISIT ADD ON: CPT

## 2024-11-11 PROCEDURE — 99215 OFFICE O/P EST HI 40 MIN: CPT

## 2024-11-13 LAB
CMV DNA SPEC QL NAA+PROBE: NOT DETECTED IU/ML
CMVPCR LOG: NOT DETECTED LOG10IU/ML

## 2024-11-25 ENCOUNTER — APPOINTMENT (OUTPATIENT)
Dept: HEMATOLOGY ONCOLOGY | Facility: CLINIC | Age: 59
End: 2024-11-25
Payer: COMMERCIAL

## 2024-11-25 ENCOUNTER — RESULT REVIEW (OUTPATIENT)
Age: 59
End: 2024-11-25

## 2024-11-25 DIAGNOSIS — Z94.84 STEM CELLS TRANSPLANT STATUS: ICD-10-CM

## 2024-11-25 LAB
ALBUMIN SERPL ELPH-MCNC: 4.3 G/DL
ALP BLD-CCNC: 60 U/L
ALT SERPL-CCNC: 24 U/L
ANION GAP SERPL CALC-SCNC: 11 MMOL/L
AST SERPL-CCNC: 28 U/L
BASOPHILS # BLD AUTO: 0.07 K/UL — SIGNIFICANT CHANGE UP (ref 0–0.2)
BASOPHILS NFR BLD AUTO: 1 % — SIGNIFICANT CHANGE UP (ref 0–2)
BILIRUB SERPL-MCNC: 0.5 MG/DL
BUN SERPL-MCNC: 20 MG/DL
CALCIUM SERPL-MCNC: 9.9 MG/DL
CHLORIDE SERPL-SCNC: 105 MMOL/L
CO2 SERPL-SCNC: 25 MMOL/L
CREAT SERPL-MCNC: 1.09 MG/DL
EGFR: 58 ML/MIN/1.73M2
EOSINOPHIL # BLD AUTO: 0.15 K/UL — SIGNIFICANT CHANGE UP (ref 0–0.5)
EOSINOPHIL NFR BLD AUTO: 2.2 % — SIGNIFICANT CHANGE UP (ref 0–6)
GLUCOSE SERPL-MCNC: 109 MG/DL
HCT VFR BLD CALC: 35.5 % — SIGNIFICANT CHANGE UP (ref 34.5–45)
HGB BLD-MCNC: 11.8 G/DL — SIGNIFICANT CHANGE UP (ref 11.5–15.5)
IMM GRANULOCYTES NFR BLD AUTO: 0.3 % — SIGNIFICANT CHANGE UP (ref 0–0.9)
LDH SERPL-CCNC: 168 U/L
LYMPHOCYTES # BLD AUTO: 1.15 K/UL — SIGNIFICANT CHANGE UP (ref 1–3.3)
LYMPHOCYTES # BLD AUTO: 17.2 % — SIGNIFICANT CHANGE UP (ref 13–44)
MAGNESIUM SERPL-MCNC: 2.2 MG/DL
MCHC RBC-ENTMCNC: 33 PG — SIGNIFICANT CHANGE UP (ref 27–34)
MCHC RBC-ENTMCNC: 33.2 G/DL — SIGNIFICANT CHANGE UP (ref 32–36)
MCV RBC AUTO: 99.2 FL — SIGNIFICANT CHANGE UP (ref 80–100)
MONOCYTES # BLD AUTO: 0.44 K/UL — SIGNIFICANT CHANGE UP (ref 0–0.9)
MONOCYTES NFR BLD AUTO: 6.6 % — SIGNIFICANT CHANGE UP (ref 2–14)
NEUTROPHILS # BLD AUTO: 4.84 K/UL — SIGNIFICANT CHANGE UP (ref 1.8–7.4)
NEUTROPHILS NFR BLD AUTO: 72.7 % — SIGNIFICANT CHANGE UP (ref 43–77)
NRBC # BLD: 0 /100 WBCS — SIGNIFICANT CHANGE UP (ref 0–0)
PLATELET # BLD AUTO: 306 K/UL — SIGNIFICANT CHANGE UP (ref 150–400)
POTASSIUM SERPL-SCNC: 5.3 MMOL/L
PROT SERPL-MCNC: 6.2 G/DL
RBC # BLD: 3.58 M/UL — LOW (ref 3.8–5.2)
RBC # FLD: 15.7 % — HIGH (ref 10.3–14.5)
SODIUM SERPL-SCNC: 141 MMOL/L
WBC # BLD: 6.67 K/UL — SIGNIFICANT CHANGE UP (ref 3.8–10.5)
WBC # FLD AUTO: 6.67 K/UL — SIGNIFICANT CHANGE UP (ref 3.8–10.5)

## 2024-11-25 PROCEDURE — 99215 OFFICE O/P EST HI 40 MIN: CPT

## 2024-11-25 PROCEDURE — G2211 COMPLEX E/M VISIT ADD ON: CPT

## 2024-11-26 LAB
CMV DNA SPEC QL NAA+PROBE: NOT DETECTED IU/ML
CMVPCR LOG: NOT DETECTED LOG10IU/ML

## 2024-12-11 ENCOUNTER — APPOINTMENT (OUTPATIENT)
Dept: HEMATOLOGY ONCOLOGY | Facility: CLINIC | Age: 59
End: 2024-12-11

## 2024-12-13 ENCOUNTER — OUTPATIENT (OUTPATIENT)
Dept: OUTPATIENT SERVICES | Facility: HOSPITAL | Age: 59
LOS: 1 days | Discharge: HOME | End: 2024-12-13

## 2024-12-13 DIAGNOSIS — Z98.891 HISTORY OF UTERINE SCAR FROM PREVIOUS SURGERY: Chronic | ICD-10-CM

## 2024-12-13 DIAGNOSIS — C90.00 MULTIPLE MYELOMA NOT HAVING ACHIEVED REMISSION: ICD-10-CM

## 2024-12-13 DIAGNOSIS — Z90.49 ACQUIRED ABSENCE OF OTHER SPECIFIED PARTS OF DIGESTIVE TRACT: Chronic | ICD-10-CM

## 2024-12-23 ENCOUNTER — APPOINTMENT (OUTPATIENT)
Dept: HEMATOLOGY ONCOLOGY | Facility: CLINIC | Age: 59
End: 2024-12-23
Payer: COMMERCIAL

## 2024-12-23 ENCOUNTER — RESULT REVIEW (OUTPATIENT)
Age: 59
End: 2024-12-23

## 2024-12-23 VITALS
WEIGHT: 125 LBS | BODY MASS INDEX: 23 KG/M2 | HEIGHT: 62 IN | HEART RATE: 110 BPM | SYSTOLIC BLOOD PRESSURE: 150 MMHG | DIASTOLIC BLOOD PRESSURE: 76 MMHG | TEMPERATURE: 97.3 F | OXYGEN SATURATION: 99 %

## 2024-12-23 DIAGNOSIS — C90.00 MULTIPLE MYELOMA NOT HAVING ACHIEVED REMISSION: ICD-10-CM

## 2024-12-23 LAB
BASOPHILS # BLD AUTO: 0.1 K/UL — SIGNIFICANT CHANGE UP (ref 0–0.2)
BASOPHILS NFR BLD AUTO: 1.4 % — SIGNIFICANT CHANGE UP (ref 0–2)
EOSINOPHIL # BLD AUTO: 0.1 K/UL — SIGNIFICANT CHANGE UP (ref 0–0.5)
EOSINOPHIL NFR BLD AUTO: 1 % — SIGNIFICANT CHANGE UP (ref 0–6)
HCT VFR BLD CALC: 39 % — SIGNIFICANT CHANGE UP (ref 34.5–45)
HGB BLD-MCNC: 12.7 G/DL — SIGNIFICANT CHANGE UP (ref 11.5–15.5)
LYMPHOCYTES # BLD AUTO: 1.2 K/UL — SIGNIFICANT CHANGE UP (ref 1–3.3)
LYMPHOCYTES # BLD AUTO: 20.6 % — SIGNIFICANT CHANGE UP (ref 13–44)
MCHC RBC-ENTMCNC: 32.6 G/DL — SIGNIFICANT CHANGE UP (ref 32–36)
MCHC RBC-ENTMCNC: 32.6 PG — SIGNIFICANT CHANGE UP (ref 27–34)
MCV RBC AUTO: 99.8 FL — SIGNIFICANT CHANGE UP (ref 80–100)
MONOCYTES # BLD AUTO: 0.2 K/UL — SIGNIFICANT CHANGE UP (ref 0–0.9)
MONOCYTES NFR BLD AUTO: 4.2 % — SIGNIFICANT CHANGE UP (ref 2–14)
NEUTROPHILS # BLD AUTO: 4.2 K/UL — SIGNIFICANT CHANGE UP (ref 1.8–7.4)
NEUTROPHILS NFR BLD AUTO: 72.8 % — SIGNIFICANT CHANGE UP (ref 43–77)
PLATELET # BLD AUTO: 285 K/UL — SIGNIFICANT CHANGE UP (ref 150–400)
RBC # BLD: 3.9 M/UL — SIGNIFICANT CHANGE UP (ref 3.8–5.2)
RBC # FLD: 12.8 % — SIGNIFICANT CHANGE UP (ref 10.3–14.5)
WBC # BLD: 5.8 K/UL — SIGNIFICANT CHANGE UP (ref 3.8–10.5)
WBC # FLD AUTO: 5.8 K/UL — SIGNIFICANT CHANGE UP (ref 3.8–10.5)

## 2024-12-23 PROCEDURE — 99215 OFFICE O/P EST HI 40 MIN: CPT

## 2024-12-30 ENCOUNTER — OUTPATIENT (OUTPATIENT)
Dept: OUTPATIENT SERVICES | Facility: HOSPITAL | Age: 59
LOS: 1 days | Discharge: HOME | End: 2024-12-30

## 2024-12-30 DIAGNOSIS — D64.9 ANEMIA, UNSPECIFIED: ICD-10-CM

## 2024-12-30 DIAGNOSIS — Z90.49 ACQUIRED ABSENCE OF OTHER SPECIFIED PARTS OF DIGESTIVE TRACT: Chronic | ICD-10-CM

## 2024-12-30 DIAGNOSIS — D47.2 MONOCLONAL GAMMOPATHY: ICD-10-CM

## 2024-12-31 LAB
ALBUMIN MFR SERPL ELPH: 74 %
ALBUMIN SERPL ELPH-MCNC: 4.7 G/DL
ALBUMIN SERPL-MCNC: 4.7 G/DL
ALBUMIN/GLOB SERPL: 2.9 RATIO
ALP BLD-CCNC: 67 U/L
ALPHA1 GLOB MFR SERPL ELPH: 3.8 %
ALPHA1 GLOB SERPL ELPH-MCNC: 0.2 G/DL
ALPHA2 GLOB MFR SERPL ELPH: 9.1 %
ALPHA2 GLOB SERPL ELPH-MCNC: 0.6 G/DL
ALT SERPL-CCNC: 30 U/L
ANION GAP SERPL CALC-SCNC: 13 MMOL/L
AST SERPL-CCNC: 25 U/L
B-GLOBULIN MFR SERPL ELPH: 9.2 %
B-GLOBULIN SERPL ELPH-MCNC: 0.6 G/DL
BILIRUB SERPL-MCNC: 0.4 MG/DL
BUN SERPL-MCNC: 19 MG/DL
CALCIUM SERPL-MCNC: 9.6 MG/DL
CHLORIDE SERPL-SCNC: 102 MMOL/L
CO2 SERPL-SCNC: 27 MMOL/L
CREAT SERPL-MCNC: 0.95 MG/DL
DEPRECATED KAPPA LC FREE/LAMBDA SER: 0.16 RATIO
EGFR: 68 ML/MIN/1.73M2
GAMMA GLOB FLD ELPH-MCNC: 0.2 G/DL
GAMMA GLOB MFR SERPL ELPH: 3.9 %
GLUCOSE SERPL-MCNC: 122 MG/DL
IGA SER QL IEP: <2 MG/DL
IGG SER QL IEP: 299 MG/DL
IGM SER QL IEP: <10 MG/DL
INTERPRETATION SERPL IEP-IMP: NORMAL
KAPPA LC CSF-MCNC: 0.98 MG/DL
KAPPA LC SERPL-MCNC: 0.16 MG/DL
M PROTEIN MFR SERPL ELPH: NORMAL
M PROTEIN SPEC IFE-MCNC: NORMAL
MONOCLON BAND OBS SERPL: NORMAL
POTASSIUM SERPL-SCNC: 4 MMOL/L
PROT SERPL-MCNC: 6.3 G/DL
PROT SERPL-MCNC: 6.3 G/DL
PROT SERPL-MCNC: 6.5 G/DL
SODIUM SERPL-SCNC: 142 MMOL/L

## 2025-01-06 ENCOUNTER — LABORATORY RESULT (OUTPATIENT)
Age: 60
End: 2025-01-06

## 2025-01-06 ENCOUNTER — RESULT REVIEW (OUTPATIENT)
Age: 60
End: 2025-01-06

## 2025-01-06 ENCOUNTER — APPOINTMENT (OUTPATIENT)
Dept: HEMATOLOGY ONCOLOGY | Facility: CLINIC | Age: 60
End: 2025-01-06
Payer: COMMERCIAL

## 2025-01-06 VITALS
HEART RATE: 116 BPM | BODY MASS INDEX: 22.74 KG/M2 | WEIGHT: 123.56 LBS | HEIGHT: 62 IN | DIASTOLIC BLOOD PRESSURE: 69 MMHG | TEMPERATURE: 98 F | RESPIRATION RATE: 18 BRPM | OXYGEN SATURATION: 98 % | SYSTOLIC BLOOD PRESSURE: 104 MMHG

## 2025-01-06 VITALS
OXYGEN SATURATION: 97 % | WEIGHT: 123 LBS | SYSTOLIC BLOOD PRESSURE: 104 MMHG | HEIGHT: 62 IN | DIASTOLIC BLOOD PRESSURE: 69 MMHG | RESPIRATION RATE: 16 BRPM | BODY MASS INDEX: 22.63 KG/M2 | TEMPERATURE: 98 F

## 2025-01-06 DIAGNOSIS — Z94.84 STEM CELLS TRANSPLANT STATUS: ICD-10-CM

## 2025-01-06 LAB
BASOPHILS # BLD AUTO: 0.05 K/UL — SIGNIFICANT CHANGE UP (ref 0–0.2)
BASOPHILS NFR BLD AUTO: 0.7 % — SIGNIFICANT CHANGE UP (ref 0–2)
EOSINOPHIL # BLD AUTO: 0.04 K/UL — SIGNIFICANT CHANGE UP (ref 0–0.5)
EOSINOPHIL NFR BLD AUTO: 0.6 % — SIGNIFICANT CHANGE UP (ref 0–6)
HCT VFR BLD CALC: 35.9 % — SIGNIFICANT CHANGE UP (ref 34.5–45)
HGB BLD-MCNC: 12.3 G/DL — SIGNIFICANT CHANGE UP (ref 11.5–15.5)
IMM GRANULOCYTES NFR BLD AUTO: 0.4 % — SIGNIFICANT CHANGE UP (ref 0–0.9)
LYMPHOCYTES # BLD AUTO: 1.33 K/UL — SIGNIFICANT CHANGE UP (ref 1–3.3)
LYMPHOCYTES # BLD AUTO: 18.4 % — SIGNIFICANT CHANGE UP (ref 13–44)
MCHC RBC-ENTMCNC: 32.7 PG — SIGNIFICANT CHANGE UP (ref 27–34)
MCHC RBC-ENTMCNC: 34.3 G/DL — SIGNIFICANT CHANGE UP (ref 32–36)
MCV RBC AUTO: 95.5 FL — SIGNIFICANT CHANGE UP (ref 80–100)
MONOCYTES # BLD AUTO: 0.4 K/UL — SIGNIFICANT CHANGE UP (ref 0–0.9)
MONOCYTES NFR BLD AUTO: 5.5 % — SIGNIFICANT CHANGE UP (ref 2–14)
NEUTROPHILS # BLD AUTO: 5.37 K/UL — SIGNIFICANT CHANGE UP (ref 1.8–7.4)
NEUTROPHILS NFR BLD AUTO: 74.4 % — SIGNIFICANT CHANGE UP (ref 43–77)
NRBC # BLD: 0 /100 WBCS — SIGNIFICANT CHANGE UP (ref 0–0)
NRBC BLD-RTO: 0 /100 WBCS — SIGNIFICANT CHANGE UP (ref 0–0)
PLATELET # BLD AUTO: 356 K/UL — SIGNIFICANT CHANGE UP (ref 150–400)
RBC # BLD: 3.76 M/UL — LOW (ref 3.8–5.2)
RBC # FLD: 13 % — SIGNIFICANT CHANGE UP (ref 10.3–14.5)
WBC # BLD: 7.22 K/UL — SIGNIFICANT CHANGE UP (ref 3.8–10.5)
WBC # FLD AUTO: 7.22 K/UL — SIGNIFICANT CHANGE UP (ref 3.8–10.5)

## 2025-01-06 PROCEDURE — 38222 DX BONE MARROW BX & ASPIR: CPT | Mod: RT

## 2025-01-06 PROCEDURE — G2211 COMPLEX E/M VISIT ADD ON: CPT

## 2025-01-06 PROCEDURE — 99215 OFFICE O/P EST HI 40 MIN: CPT

## 2025-01-07 LAB
ALBUMIN SERPL ELPH-MCNC: 4.4 G/DL
ALP BLD-CCNC: 69 U/L
ALT SERPL-CCNC: 26 U/L
ANION GAP SERPL CALC-SCNC: 15 MMOL/L
AST SERPL-CCNC: 27 U/L
BILIRUB SERPL-MCNC: 0.5 MG/DL
BUN SERPL-MCNC: 17 MG/DL
CALCIUM SERPL-MCNC: 9.8 MG/DL
CHLORIDE SERPL-SCNC: 102 MMOL/L
CMV DNA SPEC QL NAA+PROBE: NOT DETECTED IU/ML
CMVPCR LOG: NOT DETECTED LOG10IU/ML
CO2 SERPL-SCNC: 26 MMOL/L
CREAT SERPL-MCNC: 0.97 MG/DL
EGFR: 66 ML/MIN/1.73M2
GLUCOSE SERPL-MCNC: 111 MG/DL
LDH SERPL-CCNC: 178 U/L
MAGNESIUM SERPL-MCNC: 2.2 MG/DL
POTASSIUM SERPL-SCNC: 3.8 MMOL/L
PROT SERPL-MCNC: 6.4 G/DL
SODIUM SERPL-SCNC: 142 MMOL/L

## 2025-01-31 ENCOUNTER — APPOINTMENT (OUTPATIENT)
Dept: INTERNAL MEDICINE | Facility: CLINIC | Age: 60
End: 2025-01-31
Payer: COMMERCIAL

## 2025-01-31 ENCOUNTER — NON-APPOINTMENT (OUTPATIENT)
Age: 60
End: 2025-01-31

## 2025-01-31 VITALS
TEMPERATURE: 97.3 F | SYSTOLIC BLOOD PRESSURE: 128 MMHG | WEIGHT: 122 LBS | OXYGEN SATURATION: 100 % | BODY MASS INDEX: 22.45 KG/M2 | HEIGHT: 62 IN | HEART RATE: 88 BPM | DIASTOLIC BLOOD PRESSURE: 78 MMHG

## 2025-01-31 DIAGNOSIS — R10.9 UNSPECIFIED ABDOMINAL PAIN: ICD-10-CM

## 2025-01-31 DIAGNOSIS — Z00.00 ENCOUNTER FOR GENERAL ADULT MEDICAL EXAMINATION W/OUT ABNORMAL FINDINGS: ICD-10-CM

## 2025-01-31 PROCEDURE — 99396 PREV VISIT EST AGE 40-64: CPT

## 2025-01-31 PROCEDURE — 93000 ELECTROCARDIOGRAM COMPLETE: CPT

## 2025-02-03 ENCOUNTER — APPOINTMENT (OUTPATIENT)
Dept: HEMATOLOGY ONCOLOGY | Facility: CLINIC | Age: 60
End: 2025-02-03

## 2025-02-13 DIAGNOSIS — Z11.4 ENCOUNTER FOR SCREENING FOR HUMAN IMMUNODEFICIENCY VIRUS [HIV]: ICD-10-CM

## 2025-02-13 DIAGNOSIS — Z01.84 ENCOUNTER FOR ANTIBODY RESPONSE EXAMINATION: ICD-10-CM

## 2025-02-14 ENCOUNTER — LABORATORY RESULT (OUTPATIENT)
Age: 60
End: 2025-02-14

## 2025-02-15 LAB — HIV1+2 AB SPEC QL IA.RAPID: NONREACTIVE

## 2025-02-16 LAB
HBV SURFACE AB SER QL: NONREACTIVE
HCV AB SER QL: NONREACTIVE
HCV S/CO RATIO: 0.03 S/CO

## 2025-02-26 ENCOUNTER — OUTPATIENT (OUTPATIENT)
Dept: OUTPATIENT SERVICES | Facility: HOSPITAL | Age: 60
LOS: 1 days | Discharge: HOME | End: 2025-02-26

## 2025-02-26 DIAGNOSIS — Z90.49 ACQUIRED ABSENCE OF OTHER SPECIFIED PARTS OF DIGESTIVE TRACT: Chronic | ICD-10-CM

## 2025-02-26 DIAGNOSIS — Z98.891 HISTORY OF UTERINE SCAR FROM PREVIOUS SURGERY: Chronic | ICD-10-CM

## 2025-02-26 DIAGNOSIS — C90.00 MULTIPLE MYELOMA NOT HAVING ACHIEVED REMISSION: ICD-10-CM

## 2025-02-27 DIAGNOSIS — C90.00 MULTIPLE MYELOMA NOT HAVING ACHIEVED REMISSION: ICD-10-CM

## 2025-03-05 ENCOUNTER — RESULT REVIEW (OUTPATIENT)
Age: 60
End: 2025-03-05

## 2025-03-05 ENCOUNTER — APPOINTMENT (OUTPATIENT)
Dept: HEMATOLOGY ONCOLOGY | Facility: CLINIC | Age: 60
End: 2025-03-05
Payer: COMMERCIAL

## 2025-03-05 VITALS
SYSTOLIC BLOOD PRESSURE: 165 MMHG | WEIGHT: 126 LBS | TEMPERATURE: 97.4 F | HEART RATE: 107 BPM | DIASTOLIC BLOOD PRESSURE: 74 MMHG | BODY MASS INDEX: 23.19 KG/M2 | HEIGHT: 62 IN | OXYGEN SATURATION: 97 %

## 2025-03-05 DIAGNOSIS — C90.00 MULTIPLE MYELOMA NOT HAVING ACHIEVED REMISSION: ICD-10-CM

## 2025-03-05 LAB
BASOPHILS # BLD AUTO: 0.05 K/UL — SIGNIFICANT CHANGE UP (ref 0–0.2)
BASOPHILS NFR BLD AUTO: 0.8 % — SIGNIFICANT CHANGE UP (ref 0–2)
EOSINOPHIL # BLD AUTO: 0.04 K/UL — SIGNIFICANT CHANGE UP (ref 0–0.5)
EOSINOPHIL NFR BLD AUTO: 0.6 % — SIGNIFICANT CHANGE UP (ref 0–6)
HCT VFR BLD CALC: 39.6 % — SIGNIFICANT CHANGE UP (ref 34.5–45)
HGB BLD-MCNC: 12.8 G/DL — SIGNIFICANT CHANGE UP (ref 11.5–15.5)
IMM GRANULOCYTES # BLD AUTO: 0.01 K/UL — SIGNIFICANT CHANGE UP (ref 0–0.07)
IMM GRANULOCYTES NFR BLD AUTO: 0.2 % — SIGNIFICANT CHANGE UP (ref 0–0.9)
LYMPHOCYTES # BLD AUTO: 1.4 K/UL — SIGNIFICANT CHANGE UP (ref 1–3.3)
LYMPHOCYTES NFR BLD AUTO: 21.1 % — SIGNIFICANT CHANGE UP (ref 13–44)
MCHC RBC-ENTMCNC: 31 PG — SIGNIFICANT CHANGE UP (ref 27–34)
MCHC RBC-ENTMCNC: 32.3 G/DL — SIGNIFICANT CHANGE UP (ref 32–36)
MCV RBC AUTO: 95.9 FL — SIGNIFICANT CHANGE UP (ref 80–100)
MONOCYTES # BLD AUTO: 0.41 K/UL — SIGNIFICANT CHANGE UP (ref 0–0.9)
MONOCYTES NFR BLD AUTO: 6.2 % — SIGNIFICANT CHANGE UP (ref 2–14)
NEUTROPHILS # BLD AUTO: 4.72 K/UL — SIGNIFICANT CHANGE UP (ref 1.8–7.4)
NEUTROPHILS NFR BLD AUTO: 71.1 % — SIGNIFICANT CHANGE UP (ref 43–77)
NRBC # BLD AUTO: 0 K/UL — SIGNIFICANT CHANGE UP (ref 0–0)
NRBC # FLD: 0 K/UL — SIGNIFICANT CHANGE UP (ref 0–0)
NRBC BLD AUTO-RTO: 0 /100 WBCS — SIGNIFICANT CHANGE UP (ref 0–0)
PLATELET # BLD AUTO: 262 K/UL — SIGNIFICANT CHANGE UP (ref 150–400)
PMV BLD: 9.3 FL — SIGNIFICANT CHANGE UP (ref 7–13)
RBC # BLD: 4.13 M/UL — SIGNIFICANT CHANGE UP (ref 3.8–5.2)
RBC # FLD: 13.4 % — SIGNIFICANT CHANGE UP (ref 10.3–14.5)
WBC # BLD: 6.63 K/UL — SIGNIFICANT CHANGE UP (ref 3.8–10.5)
WBC # FLD AUTO: 6.63 K/UL — SIGNIFICANT CHANGE UP (ref 3.8–10.5)

## 2025-03-05 PROCEDURE — 99215 OFFICE O/P EST HI 40 MIN: CPT

## 2025-03-06 LAB
ALBUMIN SERPL ELPH-MCNC: 4.9 G/DL
ALP BLD-CCNC: 65 U/L
ALT SERPL-CCNC: 24 U/L
ANION GAP SERPL CALC-SCNC: 13 MMOL/L
AST SERPL-CCNC: 21 U/L
BILIRUB SERPL-MCNC: 0.7 MG/DL
BUN SERPL-MCNC: 19 MG/DL
CALCIUM SERPL-MCNC: 9.8 MG/DL
CHLORIDE SERPL-SCNC: 102 MMOL/L
CO2 SERPL-SCNC: 28 MMOL/L
CREAT SERPL-MCNC: 0.94 MG/DL
EGFR: 69 ML/MIN/1.73M2
GLUCOSE SERPL-MCNC: 112 MG/DL
POTASSIUM SERPL-SCNC: 4.3 MMOL/L
PROT SERPL-MCNC: 6.3 G/DL
SODIUM SERPL-SCNC: 143 MMOL/L

## 2025-03-07 LAB
DEPRECATED KAPPA LC FREE/LAMBDA SER: 2 RATIO
IGA SER QL IEP: <2 MG/DL
IGG SER QL IEP: 370 MG/DL
IGM SER QL IEP: <10 MG/DL
KAPPA LC CSF-MCNC: 0.16 MG/DL
KAPPA LC SERPL-MCNC: 0.32 MG/DL
PROT SERPL-MCNC: 6.3 G/DL

## 2025-03-28 DIAGNOSIS — C90.01 MULTIPLE MYELOMA IN REMISSION: ICD-10-CM

## 2025-03-28 DIAGNOSIS — Z85.79 PERSONAL HISTORY OF OTHER MALIGNANT NEOPLASMS OF LYMPHOID, HEMATOPOIETIC AND RELATED TISSUES: ICD-10-CM

## 2025-04-13 ENCOUNTER — OUTPATIENT (OUTPATIENT)
Dept: OUTPATIENT SERVICES | Facility: HOSPITAL | Age: 60
LOS: 1 days | Discharge: HOME | End: 2025-04-13

## 2025-04-13 DIAGNOSIS — Z98.891 HISTORY OF UTERINE SCAR FROM PREVIOUS SURGERY: Chronic | ICD-10-CM

## 2025-04-13 DIAGNOSIS — D47.2 MONOCLONAL GAMMOPATHY: ICD-10-CM

## 2025-04-13 DIAGNOSIS — Z90.49 ACQUIRED ABSENCE OF OTHER SPECIFIED PARTS OF DIGESTIVE TRACT: Chronic | ICD-10-CM

## 2025-04-13 DIAGNOSIS — D64.9 ANEMIA, UNSPECIFIED: ICD-10-CM

## 2025-04-16 ENCOUNTER — RESULT REVIEW (OUTPATIENT)
Age: 60
End: 2025-04-16

## 2025-04-16 ENCOUNTER — APPOINTMENT (OUTPATIENT)
Dept: HEMATOLOGY ONCOLOGY | Facility: CLINIC | Age: 60
End: 2025-04-16
Payer: COMMERCIAL

## 2025-04-16 ENCOUNTER — MED ADMIN CHARGE (OUTPATIENT)
Age: 60
End: 2025-04-16

## 2025-04-16 VITALS
HEART RATE: 97 BPM | OXYGEN SATURATION: 99 % | BODY MASS INDEX: 22.38 KG/M2 | WEIGHT: 122.33 LBS | TEMPERATURE: 97.3 F | RESPIRATION RATE: 16 BRPM

## 2025-04-16 DIAGNOSIS — C90.01 MULTIPLE MYELOMA IN REMISSION: ICD-10-CM

## 2025-04-16 DIAGNOSIS — Z23 ENCOUNTER FOR IMMUNIZATION: ICD-10-CM

## 2025-04-16 DIAGNOSIS — Z94.84 STEM CELLS TRANSPLANT STATUS: ICD-10-CM

## 2025-04-16 LAB
BASOPHILS # BLD AUTO: 0.02 K/UL — SIGNIFICANT CHANGE UP (ref 0–0.2)
BASOPHILS NFR BLD AUTO: 0.4 % — SIGNIFICANT CHANGE UP (ref 0–2)
EOSINOPHIL # BLD AUTO: 0.09 K/UL — SIGNIFICANT CHANGE UP (ref 0–0.5)
EOSINOPHIL NFR BLD AUTO: 1.9 % — SIGNIFICANT CHANGE UP (ref 0–6)
HCT VFR BLD CALC: 36.4 % — SIGNIFICANT CHANGE UP (ref 34.5–45)
HGB BLD-MCNC: 12.4 G/DL — SIGNIFICANT CHANGE UP (ref 11.5–15.5)
IMM GRANULOCYTES NFR BLD AUTO: 0.2 % — SIGNIFICANT CHANGE UP (ref 0–0.9)
LYMPHOCYTES # BLD AUTO: 1.2 K/UL — SIGNIFICANT CHANGE UP (ref 1–3.3)
LYMPHOCYTES # BLD AUTO: 24.7 % — SIGNIFICANT CHANGE UP (ref 13–44)
MCHC RBC-ENTMCNC: 32 PG — SIGNIFICANT CHANGE UP (ref 27–34)
MCHC RBC-ENTMCNC: 34.1 G/DL — SIGNIFICANT CHANGE UP (ref 32–36)
MCV RBC AUTO: 94.1 FL — SIGNIFICANT CHANGE UP (ref 80–100)
MONOCYTES # BLD AUTO: 0.33 K/UL — SIGNIFICANT CHANGE UP (ref 0–0.9)
MONOCYTES NFR BLD AUTO: 6.8 % — SIGNIFICANT CHANGE UP (ref 2–14)
NEUTROPHILS # BLD AUTO: 3.2 K/UL — SIGNIFICANT CHANGE UP (ref 1.8–7.4)
NEUTROPHILS NFR BLD AUTO: 66 % — SIGNIFICANT CHANGE UP (ref 43–77)
NRBC BLD AUTO-RTO: 0 /100 WBCS — SIGNIFICANT CHANGE UP (ref 0–0)
PLATELET # BLD AUTO: 272 K/UL — SIGNIFICANT CHANGE UP (ref 150–400)
RBC # BLD: 3.87 M/UL — SIGNIFICANT CHANGE UP (ref 3.8–5.2)
RBC # FLD: 13 % — SIGNIFICANT CHANGE UP (ref 10.3–14.5)
WBC # BLD: 4.85 K/UL — SIGNIFICANT CHANGE UP (ref 3.8–10.5)
WBC # FLD AUTO: 4.85 K/UL — SIGNIFICANT CHANGE UP (ref 3.8–10.5)

## 2025-04-16 PROCEDURE — 99215 OFFICE O/P EST HI 40 MIN: CPT

## 2025-04-16 PROCEDURE — 90739 HEPB VACC 2/4 DOSE ADULT IM: CPT

## 2025-04-16 PROCEDURE — 90471 IMMUNIZATION ADMIN: CPT

## 2025-04-16 PROCEDURE — 90677 PCV20 VACCINE IM: CPT

## 2025-04-16 PROCEDURE — G2211 COMPLEX E/M VISIT ADD ON: CPT

## 2025-04-16 PROCEDURE — 90472 IMMUNIZATION ADMIN EACH ADD: CPT

## 2025-04-24 LAB
ALBUMIN SERPL ELPH-MCNC: 4.7 G/DL
ALP BLD-CCNC: 60 U/L
ALT SERPL-CCNC: 32 U/L
ANION GAP SERPL CALC-SCNC: 14 MMOL/L
AST SERPL-CCNC: 26 U/L
BILIRUB SERPL-MCNC: 0.7 MG/DL
BUN SERPL-MCNC: 12 MG/DL
CALCIUM SERPL-MCNC: 9.7 MG/DL
CHLORIDE SERPL-SCNC: 102 MMOL/L
CMV DNA SPEC QL NAA+PROBE: NOT DETECTED IU/ML
CMVPCR LOG: NOT DETECTED LOG10IU/ML
CO2 SERPL-SCNC: 25 MMOL/L
CREAT SERPL-MCNC: 0.92 MG/DL
EGFRCR SERPLBLD CKD-EPI 2021: 71 ML/MIN/1.73M2
GLUCOSE SERPL-MCNC: 101 MG/DL
LDH SERPL-CCNC: 181 U/L
MAGNESIUM SERPL-MCNC: 2.3 MG/DL
POTASSIUM SERPL-SCNC: 4.6 MMOL/L
PROT SERPL-MCNC: 6.9 G/DL
SODIUM SERPL-SCNC: 141 MMOL/L

## 2025-04-25 ENCOUNTER — APPOINTMENT (OUTPATIENT)
Dept: HEMATOLOGY ONCOLOGY | Facility: CLINIC | Age: 60
End: 2025-04-25

## 2025-04-25 ENCOUNTER — MED ADMIN CHARGE (OUTPATIENT)
Age: 60
End: 2025-04-25

## 2025-04-25 PROCEDURE — 90472 IMMUNIZATION ADMIN EACH ADD: CPT

## 2025-04-25 PROCEDURE — 90698 DTAP-IPV/HIB VACCINE IM: CPT

## 2025-04-25 PROCEDURE — 90471 IMMUNIZATION ADMIN: CPT

## 2025-04-25 PROCEDURE — 90734 MENACWYD/MENACWYCRM VACC IM: CPT

## 2025-05-07 ENCOUNTER — APPOINTMENT (OUTPATIENT)
Dept: ENDOCRINOLOGY | Facility: CLINIC | Age: 60
End: 2025-05-07
Payer: COMMERCIAL

## 2025-05-07 VITALS
HEIGHT: 62 IN | WEIGHT: 127 LBS | HEART RATE: 97 BPM | SYSTOLIC BLOOD PRESSURE: 133 MMHG | DIASTOLIC BLOOD PRESSURE: 78 MMHG | BODY MASS INDEX: 23.37 KG/M2 | OXYGEN SATURATION: 100 %

## 2025-05-07 DIAGNOSIS — E04.1 NONTOXIC SINGLE THYROID NODULE: ICD-10-CM

## 2025-05-07 PROCEDURE — G2211 COMPLEX E/M VISIT ADD ON: CPT

## 2025-05-07 PROCEDURE — 99213 OFFICE O/P EST LOW 20 MIN: CPT

## 2025-05-13 DIAGNOSIS — Z94.84 STEM CELLS TRANSPLANT STATUS: ICD-10-CM

## 2025-05-13 DIAGNOSIS — C90.01 MULTIPLE MYELOMA IN REMISSION: ICD-10-CM

## 2025-05-16 DIAGNOSIS — Z23 ENCOUNTER FOR IMMUNIZATION: ICD-10-CM

## 2025-05-20 ENCOUNTER — OUTPATIENT (OUTPATIENT)
Dept: OUTPATIENT SERVICES | Facility: HOSPITAL | Age: 60
LOS: 1 days | Discharge: HOME | End: 2025-05-20

## 2025-05-20 DIAGNOSIS — Z98.891 HISTORY OF UTERINE SCAR FROM PREVIOUS SURGERY: Chronic | ICD-10-CM

## 2025-05-20 DIAGNOSIS — Z90.49 ACQUIRED ABSENCE OF OTHER SPECIFIED PARTS OF DIGESTIVE TRACT: Chronic | ICD-10-CM

## 2025-05-21 ENCOUNTER — APPOINTMENT (OUTPATIENT)
Dept: INTERNAL MEDICINE | Facility: CLINIC | Age: 60
End: 2025-05-21

## 2025-05-22 ENCOUNTER — OUTPATIENT (OUTPATIENT)
Dept: OUTPATIENT SERVICES | Facility: HOSPITAL | Age: 60
LOS: 1 days | Discharge: HOME | End: 2025-05-22

## 2025-05-22 DIAGNOSIS — Z98.891 HISTORY OF UTERINE SCAR FROM PREVIOUS SURGERY: Chronic | ICD-10-CM

## 2025-05-22 DIAGNOSIS — Z90.49 ACQUIRED ABSENCE OF OTHER SPECIFIED PARTS OF DIGESTIVE TRACT: Chronic | ICD-10-CM

## 2025-05-22 DIAGNOSIS — C90.00 MULTIPLE MYELOMA NOT HAVING ACHIEVED REMISSION: ICD-10-CM

## 2025-05-23 ENCOUNTER — RESULT REVIEW (OUTPATIENT)
Age: 60
End: 2025-05-23

## 2025-05-23 ENCOUNTER — MED ADMIN CHARGE (OUTPATIENT)
Age: 60
End: 2025-05-23

## 2025-05-23 ENCOUNTER — APPOINTMENT (OUTPATIENT)
Dept: HEMATOLOGY ONCOLOGY | Facility: CLINIC | Age: 60
End: 2025-05-23

## 2025-05-23 LAB
BASOPHILS # BLD AUTO: 0.05 K/UL — SIGNIFICANT CHANGE UP (ref 0–0.2)
BASOPHILS NFR BLD AUTO: 0.8 % — SIGNIFICANT CHANGE UP (ref 0–2)
CD16+CD56+ CELLS # BLD: 218 CELLS/UL
CD16+CD56+ CELLS NFR BLD: 15 %
CD19 CELLS NFR BLD: 191 CELLS/UL
CD3 CELLS # BLD: 1069 CELLS/UL
CD3 CELLS NFR BLD: 69 %
CD3+CD4+ CELLS # BLD: 560 CELLS/UL
CD3+CD4+ CELLS NFR BLD: 35 %
CD3+CD4+ CELLS/CD3+CD8+ CLL SPEC: 1.07 RATIO
CD3+CD8+ CELLS # SPEC: 521 CELLS/UL
CD3+CD8+ CELLS NFR BLD: 33 %
CELLS.CD3-CD19+/CELLS IN BLOOD: 13 %
EOSINOPHIL # BLD AUTO: 0.17 K/UL — SIGNIFICANT CHANGE UP (ref 0–0.5)
EOSINOPHIL NFR BLD AUTO: 2.6 % — SIGNIFICANT CHANGE UP (ref 0–6)
HCT VFR BLD CALC: 38.8 % — SIGNIFICANT CHANGE UP (ref 34.5–45)
HGB BLD-MCNC: 13.2 G/DL — SIGNIFICANT CHANGE UP (ref 11.5–15.5)
IMM GRANULOCYTES NFR BLD AUTO: 0.5 % — SIGNIFICANT CHANGE UP (ref 0–0.9)
LYMPHOCYTES # BLD AUTO: 1.41 K/UL — SIGNIFICANT CHANGE UP (ref 1–3.3)
LYMPHOCYTES # BLD AUTO: 21.7 % — SIGNIFICANT CHANGE UP (ref 13–44)
MCHC RBC-ENTMCNC: 32.6 PG — SIGNIFICANT CHANGE UP (ref 27–34)
MCHC RBC-ENTMCNC: 34 G/DL — SIGNIFICANT CHANGE UP (ref 32–36)
MCV RBC AUTO: 95.8 FL — SIGNIFICANT CHANGE UP (ref 80–100)
MONOCYTES # BLD AUTO: 0.52 K/UL — SIGNIFICANT CHANGE UP (ref 0–0.9)
MONOCYTES NFR BLD AUTO: 8 % — SIGNIFICANT CHANGE UP (ref 2–14)
NEUTROPHILS # BLD AUTO: 4.33 K/UL — SIGNIFICANT CHANGE UP (ref 1.8–7.4)
NEUTROPHILS NFR BLD AUTO: 66.4 % — SIGNIFICANT CHANGE UP (ref 43–77)
NRBC BLD AUTO-RTO: 0 /100 WBCS — SIGNIFICANT CHANGE UP (ref 0–0)
PLATELET # BLD AUTO: 267 K/UL — SIGNIFICANT CHANGE UP (ref 150–400)
RBC # BLD: 4.05 M/UL — SIGNIFICANT CHANGE UP (ref 3.8–5.2)
RBC # FLD: 13.3 % — SIGNIFICANT CHANGE UP (ref 10.3–14.5)
VIABILITY: NORMAL
WBC # BLD: 6.51 K/UL — SIGNIFICANT CHANGE UP (ref 3.8–10.5)
WBC # FLD AUTO: 6.51 K/UL — SIGNIFICANT CHANGE UP (ref 3.8–10.5)

## 2025-05-23 PROCEDURE — 90739 HEPB VACC 2/4 DOSE ADULT IM: CPT

## 2025-05-23 PROCEDURE — 90472 IMMUNIZATION ADMIN EACH ADD: CPT

## 2025-05-23 PROCEDURE — 90698 DTAP-IPV/HIB VACCINE IM: CPT

## 2025-05-23 PROCEDURE — G0010: CPT

## 2025-05-28 ENCOUNTER — RESULT REVIEW (OUTPATIENT)
Age: 60
End: 2025-05-28

## 2025-05-28 ENCOUNTER — APPOINTMENT (OUTPATIENT)
Dept: HEMATOLOGY ONCOLOGY | Facility: CLINIC | Age: 60
End: 2025-05-28

## 2025-05-28 VITALS
BODY MASS INDEX: 23.22 KG/M2 | HEART RATE: 86 BPM | HEIGHT: 62 IN | OXYGEN SATURATION: 99 % | TEMPERATURE: 97.9 F | WEIGHT: 126.19 LBS

## 2025-05-28 LAB
ALBUMIN SERPL ELPH-MCNC: 4.8 G/DL
ALP BLD-CCNC: 70 U/L
ALT SERPL-CCNC: 29 U/L
ANION GAP SERPL CALC-SCNC: 12 MMOL/L
AST SERPL-CCNC: 28 U/L
BASOPHILS # BLD AUTO: 0.05 K/UL — SIGNIFICANT CHANGE UP (ref 0–0.2)
BASOPHILS NFR BLD AUTO: 0.9 % — SIGNIFICANT CHANGE UP (ref 0–2)
BILIRUB SERPL-MCNC: 0.4 MG/DL
BUN SERPL-MCNC: 26 MG/DL
CALCIUM SERPL-MCNC: 9.3 MG/DL
CHLORIDE SERPL-SCNC: 102 MMOL/L
CMV DNA SPEC QL NAA+PROBE: NOT DETECTED IU/ML
CMVPCR LOG: NOT DETECTED LOG10IU/ML
CO2 SERPL-SCNC: 25 MMOL/L
CREAT SERPL-MCNC: 1.06 MG/DL
EGFRCR SERPLBLD CKD-EPI 2021: 60 ML/MIN/1.73M2
EOSINOPHIL # BLD AUTO: 0.15 K/UL — SIGNIFICANT CHANGE UP (ref 0–0.5)
EOSINOPHIL NFR BLD AUTO: 2.7 % — SIGNIFICANT CHANGE UP (ref 0–6)
GLUCOSE SERPL-MCNC: 96 MG/DL
HCT VFR BLD CALC: 36.9 % — SIGNIFICANT CHANGE UP (ref 34.5–45)
HGB BLD-MCNC: 11.9 G/DL — SIGNIFICANT CHANGE UP (ref 11.5–15.5)
IMM GRANULOCYTES # BLD AUTO: 0.01 K/UL — SIGNIFICANT CHANGE UP (ref 0–0.07)
IMM GRANULOCYTES NFR BLD AUTO: 0.2 % — SIGNIFICANT CHANGE UP (ref 0–0.9)
LDH SERPL-CCNC: 153 U/L
LYMPHOCYTES # BLD AUTO: 1.46 K/UL — SIGNIFICANT CHANGE UP (ref 1–3.3)
LYMPHOCYTES NFR BLD AUTO: 26 % — SIGNIFICANT CHANGE UP (ref 13–44)
MAGNESIUM SERPL-MCNC: 2.3 MG/DL
MCHC RBC-ENTMCNC: 30.9 PG — SIGNIFICANT CHANGE UP (ref 27–34)
MCHC RBC-ENTMCNC: 32.2 G/DL — SIGNIFICANT CHANGE UP (ref 32–36)
MCV RBC AUTO: 95.8 FL — SIGNIFICANT CHANGE UP (ref 80–100)
MONOCYTES # BLD AUTO: 0.41 K/UL — SIGNIFICANT CHANGE UP (ref 0–0.9)
MONOCYTES NFR BLD AUTO: 7.3 % — SIGNIFICANT CHANGE UP (ref 2–14)
NEUTROPHILS # BLD AUTO: 3.53 K/UL — SIGNIFICANT CHANGE UP (ref 1.8–7.4)
NEUTROPHILS NFR BLD AUTO: 62.9 % — SIGNIFICANT CHANGE UP (ref 43–77)
NRBC # BLD AUTO: 0 K/UL — SIGNIFICANT CHANGE UP (ref 0–0)
NRBC # FLD: 0 K/UL — SIGNIFICANT CHANGE UP (ref 0–0)
NRBC BLD AUTO-RTO: 0 /100 WBCS — SIGNIFICANT CHANGE UP (ref 0–0)
PLATELET # BLD AUTO: 241 K/UL — SIGNIFICANT CHANGE UP (ref 150–400)
PMV BLD: 9 FL — SIGNIFICANT CHANGE UP (ref 7–13)
POTASSIUM SERPL-SCNC: 4.9 MMOL/L
PROT SERPL-MCNC: 6.8 G/DL
RBC # BLD: 3.85 M/UL — SIGNIFICANT CHANGE UP (ref 3.8–5.2)
RBC # FLD: 13.2 % — SIGNIFICANT CHANGE UP (ref 10.3–14.5)
SODIUM SERPL-SCNC: 139 MMOL/L
WBC # BLD: 5.61 K/UL — SIGNIFICANT CHANGE UP (ref 3.8–10.5)
WBC # FLD AUTO: 5.61 K/UL — SIGNIFICANT CHANGE UP (ref 3.8–10.5)

## 2025-05-28 PROCEDURE — 99214 OFFICE O/P EST MOD 30 MIN: CPT

## 2025-05-28 RX ORDER — ACYCLOVIR 400 MG/1
400 TABLET ORAL TWICE DAILY
Qty: 60 | Refills: 6 | Status: ACTIVE | COMMUNITY
Start: 2025-05-28 | End: 1900-01-01

## 2025-06-20 ENCOUNTER — MED ADMIN CHARGE (OUTPATIENT)
Age: 60
End: 2025-06-20

## 2025-06-20 ENCOUNTER — APPOINTMENT (OUTPATIENT)
Dept: HEMATOLOGY ONCOLOGY | Facility: CLINIC | Age: 60
End: 2025-06-20

## 2025-06-20 PROCEDURE — 90684 PCV21 VACCINE IM: CPT

## 2025-06-20 PROCEDURE — 90734 MENACWYD/MENACWYCRM VACC IM: CPT

## 2025-06-20 PROCEDURE — G0009: CPT

## 2025-06-20 PROCEDURE — 90472 IMMUNIZATION ADMIN EACH ADD: CPT

## 2025-06-20 PROCEDURE — 90698 DTAP-IPV/HIB VACCINE IM: CPT

## 2025-07-28 DIAGNOSIS — Z23 ENCOUNTER FOR IMMUNIZATION: ICD-10-CM

## 2025-08-01 ENCOUNTER — MED ADMIN CHARGE (OUTPATIENT)
Age: 60
End: 2025-08-01

## 2025-08-01 ENCOUNTER — APPOINTMENT (OUTPATIENT)
Dept: HEMATOLOGY ONCOLOGY | Facility: CLINIC | Age: 60
End: 2025-08-01
Payer: COMMERCIAL

## 2025-08-01 PROCEDURE — 90750 HZV VACC RECOMBINANT IM: CPT

## 2025-08-01 PROCEDURE — 90471 IMMUNIZATION ADMIN: CPT

## 2025-08-06 ENCOUNTER — APPOINTMENT (OUTPATIENT)
Dept: HEMATOLOGY ONCOLOGY | Facility: CLINIC | Age: 60
End: 2025-08-06

## 2025-08-06 ENCOUNTER — LABORATORY RESULT (OUTPATIENT)
Age: 60
End: 2025-08-06

## 2025-08-06 ENCOUNTER — RESULT REVIEW (OUTPATIENT)
Age: 60
End: 2025-08-06

## 2025-08-06 VITALS
TEMPERATURE: 97.3 F | DIASTOLIC BLOOD PRESSURE: 74 MMHG | OXYGEN SATURATION: 99 % | WEIGHT: 130.44 LBS | SYSTOLIC BLOOD PRESSURE: 143 MMHG | HEIGHT: 62 IN | BODY MASS INDEX: 24.01 KG/M2 | HEART RATE: 94 BPM

## 2025-08-06 PROCEDURE — 99214 OFFICE O/P EST MOD 30 MIN: CPT

## 2025-09-12 ENCOUNTER — NON-APPOINTMENT (OUTPATIENT)
Age: 60
End: 2025-09-12

## 2025-09-12 ENCOUNTER — APPOINTMENT (OUTPATIENT)
Dept: OBGYN | Facility: CLINIC | Age: 60
End: 2025-09-12
Payer: COMMERCIAL

## 2025-09-12 VITALS
HEIGHT: 62 IN | SYSTOLIC BLOOD PRESSURE: 127 MMHG | DIASTOLIC BLOOD PRESSURE: 76 MMHG | WEIGHT: 130 LBS | BODY MASS INDEX: 23.92 KG/M2

## 2025-09-12 DIAGNOSIS — Z80.3 FAMILY HISTORY OF MALIGNANT NEOPLASM OF BREAST: ICD-10-CM

## 2025-09-12 DIAGNOSIS — Z90.13 ACQUIRED ABSENCE OF BILATERAL BREASTS AND NIPPLES: ICD-10-CM

## 2025-09-12 DIAGNOSIS — Z01.419 ENCOUNTER FOR GYNECOLOGICAL EXAMINATION (GENERAL) (ROUTINE) W/OUT ABNORMAL FINDINGS: ICD-10-CM

## 2025-09-12 PROCEDURE — 99459 PELVIC EXAMINATION: CPT

## 2025-09-12 PROCEDURE — 82270 OCCULT BLOOD FECES: CPT

## 2025-09-12 PROCEDURE — 99386 PREV VISIT NEW AGE 40-64: CPT

## 2025-09-16 LAB — CYTOLOGY CVX/VAG DOC THIN PREP: NORMAL
